# Patient Record
Sex: FEMALE | Race: WHITE | NOT HISPANIC OR LATINO | Employment: STUDENT | URBAN - METROPOLITAN AREA
[De-identification: names, ages, dates, MRNs, and addresses within clinical notes are randomized per-mention and may not be internally consistent; named-entity substitution may affect disease eponyms.]

---

## 2023-11-28 ENCOUNTER — OFFICE VISIT (OUTPATIENT)
Dept: URGENT CARE | Facility: CLINIC | Age: 33
End: 2023-11-28
Payer: COMMERCIAL

## 2023-11-28 VITALS
BODY MASS INDEX: 33.59 KG/M2 | DIASTOLIC BLOOD PRESSURE: 83 MMHG | RESPIRATION RATE: 16 BRPM | HEART RATE: 84 BPM | HEIGHT: 67 IN | WEIGHT: 214 LBS | SYSTOLIC BLOOD PRESSURE: 123 MMHG | OXYGEN SATURATION: 98 % | TEMPERATURE: 98 F

## 2023-11-28 DIAGNOSIS — J02.9 SORE THROAT: ICD-10-CM

## 2023-11-28 DIAGNOSIS — R09.81 SINUS CONGESTION: ICD-10-CM

## 2023-11-28 DIAGNOSIS — J06.9 VIRAL URI: Primary | ICD-10-CM

## 2023-11-28 LAB
CTP QC/QA: YES
CTP QC/QA: YES
POC MOLECULAR INFLUENZA A AGN: NEGATIVE
POC MOLECULAR INFLUENZA B AGN: NEGATIVE
SARS-COV-2 RDRP RESP QL NAA+PROBE: NEGATIVE

## 2023-11-28 PROCEDURE — 87070 CULTURE OTHR SPECIMN AEROBIC: CPT | Performed by: NURSE PRACTITIONER

## 2023-11-28 PROCEDURE — 99203 PR OFFICE/OUTPT VISIT, NEW, LEVL III, 30-44 MIN: ICD-10-PCS | Mod: S$GLB,,, | Performed by: NURSE PRACTITIONER

## 2023-11-28 PROCEDURE — 87502 INFLUENZA DNA AMP PROBE: CPT | Mod: QW,S$GLB,, | Performed by: NURSE PRACTITIONER

## 2023-11-28 PROCEDURE — 87635: ICD-10-PCS | Mod: QW,S$GLB,, | Performed by: NURSE PRACTITIONER

## 2023-11-28 PROCEDURE — 99203 OFFICE O/P NEW LOW 30 MIN: CPT | Mod: S$GLB,,, | Performed by: NURSE PRACTITIONER

## 2023-11-28 PROCEDURE — 87635 SARS-COV-2 COVID-19 AMP PRB: CPT | Mod: QW,S$GLB,, | Performed by: NURSE PRACTITIONER

## 2023-11-28 PROCEDURE — 87502 POCT INFLUENZA A/B MOLECULAR: ICD-10-PCS | Mod: QW,S$GLB,, | Performed by: NURSE PRACTITIONER

## 2023-11-28 RX ORDER — CETIRIZINE HYDROCHLORIDE 10 MG/1
TABLET ORAL
COMMUNITY
Start: 2018-05-01

## 2023-11-28 RX ORDER — RIZATRIPTAN BENZOATE 10 MG/1
TABLET ORAL
COMMUNITY

## 2023-11-28 RX ORDER — CHOLECALCIFEROL (VITAMIN D3) 125 MCG
CAPSULE ORAL
COMMUNITY
Start: 2023-10-23

## 2023-11-28 RX ORDER — ERENUMAB-AOOE 140 MG/ML
INJECTION, SOLUTION SUBCUTANEOUS
COMMUNITY
Start: 2023-07-16

## 2023-11-28 RX ORDER — MUPIROCIN 20 MG/G
OINTMENT TOPICAL 3 TIMES DAILY
COMMUNITY
Start: 2023-07-05

## 2023-11-28 RX ORDER — MAGNESIUM 200 MG
TABLET ORAL
COMMUNITY
Start: 2023-11-01

## 2023-11-28 RX ORDER — FREMANEZUMAB-VFRM 225 MG/1.5ML
INJECTION SUBCUTANEOUS
COMMUNITY

## 2023-11-28 RX ORDER — TRIAMCINOLONE ACETONIDE 1 MG/G
PASTE DENTAL
COMMUNITY
Start: 2023-11-20

## 2023-11-28 RX ORDER — BACLOFEN 5 MG/1
5 TABLET ORAL
COMMUNITY

## 2023-11-28 RX ORDER — CARBAMAZEPINE 100 MG/5ML
SUSPENSION ORAL
COMMUNITY
Start: 2021-05-28

## 2023-11-28 RX ORDER — CEPHALEXIN 500 MG/1
500 CAPSULE ORAL 3 TIMES DAILY
COMMUNITY
Start: 2023-07-05

## 2023-11-28 RX ORDER — MONTELUKAST SODIUM 10 MG/1
TABLET ORAL
COMMUNITY
Start: 2021-05-01

## 2023-11-28 RX ORDER — PERPHENAZINE 8 MG
TABLET ORAL
COMMUNITY
Start: 2023-10-02

## 2023-11-28 RX ORDER — PREDNISONE 20 MG/1
20 TABLET ORAL DAILY
Qty: 3 TABLET | Refills: 0 | Status: SHIPPED | OUTPATIENT
Start: 2023-11-28 | End: 2023-12-01

## 2023-11-28 RX ORDER — LAMOTRIGINE 100 MG/1
TABLET, ORALLY DISINTEGRATING ORAL
COMMUNITY
Start: 2021-05-28

## 2023-11-28 NOTE — PROGRESS NOTES
"Subjective:      Patient ID: Maricel Jha is a 33 y.o. female.    Vitals:  height is 5' 7" (1.702 m) and weight is 97.1 kg (214 lb). Her temperature is 98.4 °F (36.9 °C). Her blood pressure is 123/83 and her pulse is 84. Her respiration is 16 and oxygen saturation is 98%.     Chief Complaint: Sinus Problem    Pt presents complaints of sinus pressure, ear pain and a sore throat. Symptoms started yesterday. Sinus pressure is in located in her face and above her eyes. Right ear. Pain level 2. Pt does suffer with cluster headaches. Right side of throat lymph nodes feel swollen.   Provider note begins below    Patient reports a history of headaches that is similar to a trigeminal neuralgia type symptoms.  She states this is different from those headaches.  She is visiting now from [place].  Denies any ill contacts.  No cough or fevers.  Patient states she gets strep a lot in the office test usually are negative.  She reports recent issue with gum swelling she has some triamcinolone paste in his helped.  She reports nasal irritation with some blood in her discharge.  Denies any discharge from nose or cough.    Sinus Problem  This is a new problem. The current episode started yesterday. The problem has been gradually worsening since onset. There has been no fever. Her pain is at a severity of 2/10. The pain is mild. Associated symptoms include congestion, headaches, sinus pressure, a sore throat and swollen glands. Pertinent negatives include no chills, coughing, diaphoresis, ear pain, hoarse voice, neck pain, shortness of breath or sneezing. Past treatments include nothing.       Constitution: Negative for chills and sweating.   HENT:  Positive for congestion, sinus pressure and sore throat. Negative for ear pain.    Neck: Negative for neck pain.   Respiratory:  Negative for cough and shortness of breath.    Allergic/Immunologic: Negative for sneezing.   Neurological:  Positive for headaches.      Objective: "     Physical Exam   Constitutional: She is oriented to person, place, and time.   HENT:   Head: Normocephalic and atraumatic.   Ears:   Right Ear: Tympanic membrane, external ear and ear canal normal. impacted cerumen  Left Ear: Tympanic membrane, external ear and ear canal normal. impacted cerumen  Nose: No rhinorrhea or congestion.   Mouth/Throat: Posterior oropharyngeal erythema present. No oropharyngeal exudate.   Cardiovascular: Normal rate and regular rhythm.   Pulmonary/Chest: Effort normal. No stridor. No respiratory distress. She has no wheezes. She has no rhonchi. She has no rales. She exhibits no tenderness.   Abdominal: Normal appearance.   Lymphadenopathy:     She has no cervical adenopathy.   Neurological: She is alert and oriented to person, place, and time.   Skin: Skin is warm and dry.   Psychiatric: Her behavior is normal. Mood normal.     Results for orders placed or performed in visit on 11/28/23   POCT Influenza A/B MOLECULAR   Result Value Ref Range    POC Molecular Influenza A Ag Negative Negative, Not Reported    POC Molecular Influenza B Ag Negative Negative, Not Reported     Acceptable Yes    POCT COVID-19 Rapid Screening   Result Value Ref Range    POC Rapid COVID Negative Negative     Acceptable Yes       Assessment:     1. Viral URI    2. Sinus congestion    3. Sore throat        Plan:   Flu and COVID test negative   Throat culture due to patient's history of strep.  Declines strep test in clinic with like a culture.  Short course of steroids to decrease inflammation   Follow-up with PCP  Follow up if not improving or worsening symptoms.      Viral URI    Sinus congestion  -     Cancel: SARS Coronavirus 2 Antigen, POCT Manual Read  -     POCT Influenza A/B MOLECULAR  -     POCT COVID-19 Rapid Screening  -     predniSONE (DELTASONE) 20 MG tablet; Take 1 tablet (20 mg total) by mouth once daily. for 3 days  Dispense: 3 tablet; Refill: 0    Sore throat  -      CULTURE, RESPIRATORY  - THROAT  -     predniSONE (DELTASONE) 20 MG tablet; Take 1 tablet (20 mg total) by mouth once daily. for 3 days  Dispense: 3 tablet; Refill: 0

## 2023-11-29 ENCOUNTER — TELEPHONE (OUTPATIENT)
Dept: URGENT CARE | Facility: CLINIC | Age: 33
End: 2023-11-29
Payer: COMMERCIAL

## 2023-11-29 ENCOUNTER — PATIENT MESSAGE (OUTPATIENT)
Dept: URGENT CARE | Facility: CLINIC | Age: 33
End: 2023-11-29
Payer: COMMERCIAL

## 2023-11-29 NOTE — TELEPHONE ENCOUNTER
Called For follow-up.  Patient reports some diarrhea and nausea which is gradually resolving.  Fluid and dietary advice given Advised to follow-up with PCP. Answered all questions.

## 2023-11-29 NOTE — TELEPHONE ENCOUNTER
I reached pt on her cell number and she mentions that Dr Gregg had already spoken to her and feels her symptoms are consisitent with a viral syndrome and I agree. Recommend rest, fluids, tylenol etc. Return if symptoms worsen.

## 2023-11-30 ENCOUNTER — TELEPHONE (OUTPATIENT)
Dept: URGENT CARE | Facility: CLINIC | Age: 33
End: 2023-11-30
Payer: COMMERCIAL

## 2023-11-30 NOTE — TELEPHONE ENCOUNTER
I spoke w/ the patient and reviewed preliminary result.  All questions answered.  Supportive therapy discussed.

## 2023-12-01 ENCOUNTER — TELEPHONE (OUTPATIENT)
Dept: URGENT CARE | Facility: CLINIC | Age: 33
End: 2023-12-01
Payer: COMMERCIAL

## 2023-12-01 LAB — BACTERIA THROAT CULT: NORMAL

## 2023-12-01 NOTE — TELEPHONE ENCOUNTER
Second attempt to contact patient regarding throat culture from previous visit 11/28/2023 with nurse practitioner Mina.  Patient did not answer the phone.  Left voicemail for patient to contact clinic if patient has any questions or concerns.  She is not reviewed her results via [employer].    Results for orders placed or performed in visit on 11/28/23   CULTURE, RESPIRATORY  - THROAT    Specimen: Throat   Result Value Ref Range    RESPIRATORY CULTURE - THROAT Normal respiratory belem    POCT Influenza A/B MOLECULAR   Result Value Ref Range    POC Molecular Influenza A Ag Negative Negative, Not Reported    POC Molecular Influenza B Ag Negative Negative, Not Reported     Acceptable Yes    POCT COVID-19 Rapid Screening   Result Value Ref Range    POC Rapid COVID Negative Negative     Acceptable Yes

## 2023-12-02 ENCOUNTER — TELEPHONE (OUTPATIENT)
Dept: URGENT CARE | Facility: CLINIC | Age: 33
End: 2023-12-02
Payer: COMMERCIAL

## 2023-12-02 NOTE — TELEPHONE ENCOUNTER
pt called back returning a providers call. informed pt of her negitive throat culture. pt states she is feeling better.

## 2024-04-17 ENCOUNTER — TELEPHONE (OUTPATIENT)
Dept: SPORTS MEDICINE | Facility: CLINIC | Age: 34
End: 2024-04-17
Payer: COMMERCIAL

## 2024-04-17 ENCOUNTER — PATIENT MESSAGE (OUTPATIENT)
Dept: SPORTS MEDICINE | Facility: CLINIC | Age: 34
End: 2024-04-17
Payer: COMMERCIAL

## 2024-04-17 NOTE — TELEPHONE ENCOUNTER
----- Message from Mounika Brandt sent at 4/17/2024  2:12 PM CDT -----  Regarding: pt advice  Contact: pt [phone]  Pt returning call from missed call, pls call

## 2024-04-22 ENCOUNTER — OFFICE VISIT (OUTPATIENT)
Dept: SPORTS MEDICINE | Facility: CLINIC | Age: 34
End: 2024-04-22
Payer: COMMERCIAL

## 2024-04-22 VITALS — SYSTOLIC BLOOD PRESSURE: 134 MMHG | DIASTOLIC BLOOD PRESSURE: 77 MMHG

## 2024-04-22 DIAGNOSIS — M99.00 SOMATIC DYSFUNCTION OF HEAD REGION: ICD-10-CM

## 2024-04-22 DIAGNOSIS — M99.07 UPPER EXTREMITY SOMATIC DYSFUNCTION: ICD-10-CM

## 2024-04-22 DIAGNOSIS — M79.10 MYALGIA: ICD-10-CM

## 2024-04-22 DIAGNOSIS — M99.01 CERVICAL (NECK) REGION SOMATIC DYSFUNCTION: ICD-10-CM

## 2024-04-22 DIAGNOSIS — G44.059 SHORT LASTING UNILATERAL NEURALGIFORM HEADACHE WITH CONJUNCTIVAL INJECTION AND TEARING (SUNCT), NOT INTRACTABLE: Primary | ICD-10-CM

## 2024-04-22 DIAGNOSIS — M54.2 CERVICALGIA: ICD-10-CM

## 2024-04-22 DIAGNOSIS — M99.02 SOMATIC DYSFUNCTION OF THORACIC REGION: ICD-10-CM

## 2024-04-22 DIAGNOSIS — M99.08 SOMATIC DYSFUNCTION OF RIB CAGE REGION: ICD-10-CM

## 2024-04-22 PROCEDURE — 99999 PR PBB SHADOW E&M-EST. PATIENT-LVL III: CPT | Mod: PBBFAC,,, | Performed by: NEUROMUSCULOSKELETAL MEDICINE & OMM

## 2024-04-22 PROCEDURE — 3078F DIAST BP <80 MM HG: CPT | Mod: CPTII,S$GLB,, | Performed by: NEUROMUSCULOSKELETAL MEDICINE & OMM

## 2024-04-22 PROCEDURE — 3075F SYST BP GE 130 - 139MM HG: CPT | Mod: CPTII,S$GLB,, | Performed by: NEUROMUSCULOSKELETAL MEDICINE & OMM

## 2024-04-22 PROCEDURE — 99204 OFFICE O/P NEW MOD 45 MIN: CPT | Mod: 25,S$GLB,, | Performed by: NEUROMUSCULOSKELETAL MEDICINE & OMM

## 2024-04-22 PROCEDURE — 1160F RVW MEDS BY RX/DR IN RCRD: CPT | Mod: CPTII,S$GLB,, | Performed by: NEUROMUSCULOSKELETAL MEDICINE & OMM

## 2024-04-22 PROCEDURE — 97110 THERAPEUTIC EXERCISES: CPT | Mod: GP,S$GLB,, | Performed by: NEUROMUSCULOSKELETAL MEDICINE & OMM

## 2024-04-22 PROCEDURE — 98927 OSTEOPATH MANJ 5-6 REGIONS: CPT | Mod: S$GLB,,, | Performed by: NEUROMUSCULOSKELETAL MEDICINE & OMM

## 2024-04-22 PROCEDURE — 1159F MED LIST DOCD IN RCRD: CPT | Mod: CPTII,S$GLB,, | Performed by: NEUROMUSCULOSKELETAL MEDICINE & OMM

## 2024-04-22 RX ORDER — PROGESTERONE 100 MG/1
100 CAPSULE ORAL DAILY
COMMUNITY

## 2024-04-22 NOTE — PROGRESS NOTES
Subjective:     Maricel Jha    Chief Complaint   Patient presents with    Headache     HPI    Maircel is coming in today for headache/neck pain that began about 3 years ago. Pt. describes the pain as a 4/10 achy pain that does radiate. At worst, gets to 7-8/10. Pain began on the right side of her head, face with redness of her head. 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 Symptoms for this manifest similar RIGHT eye twitching and right sided upper back, neck and shoulder pain. Denies radiation of pain down the arm and into the hand, fingers and denies sensation changes and neuropathic pain. There was not a fall/injury/ or trauma associated with the onset of symptoms. The pain is better with stretching, prior OMT type treatments and worse with lack of sleep, stress. Pt. Denies any other musculoskeletal complaints at this time.     PAST MEDICAL HISTORY: No past medical history on file.  PAST SURGICAL HISTORY: No past surgical history on file.  FAMILY HISTORY: No family history on file.  SOCIAL HISTORY:   Social History     Socioeconomic History    Marital status:    Tobacco Use    Smoking status: Never    Smokeless tobacco: Never     Social Determinants of Health     Financial Resource Strain: Low Risk  (4/16/2024)    Received from [facility]    Overall Financial Resource Strain (CARDIA)     Difficulty of Paying Living Expenses: Not hard at all   Food Insecurity: No Food Insecurity (4/16/2024)    Received from [facility]    Hunger Vital Sign     Worried About Running Out of Food in the Last Year: Never true     Ran Out of Food in the Last Year: Never true   Transportation Needs: No Transportation Needs (4/16/2024)    Received from [facility]    PRAPARE - Transportation     Lack of Transportation (Medical): No     Lack of Transportation (Non-Medical): No   Physical Activity: Insufficiently Active (4/16/2024)     Received from [facility]    Exercise Vital Sign     Days of Exercise per Week: 2 days     Minutes of Exercise per Session: 60 min   Stress: No Stress Concern Present (2024)    Received from [facility]    [employer] [place] [employer] - Occupational Stress Questionnaire     Feeling of Stress : Only a little   Social Connections: Moderately Integrated (2024)    Received from [facility]    Social Connection and Isolation Panel [NHANES]     Frequency of Communication with Friends and Family: More than three times a week     Frequency of Social Gatherings with Friends and Family: More than three times a week     Attends Nondenominational Services: Never     Active Member of Clubs or Organizations: Yes     Attends Club or Organization Meetings: More than 4 times per year     Marital Status:      MEDICATIONS:   Current Outpatient Medications:     AJOVY AUTOINJECTOR 225 mg/1.5 mL autoinjector, Inject into the skin., Disp: , Rfl:     carBAMazepine 100 mg/5 mL (5 mL) Susp, , Disp: , Rfl:     cetirizine (ZYRTEC) 10 MG tablet, , Disp: , Rfl:     cholecalciferol, vitamin D3, 125 mcg (5,000 unit) capsule, , Disp: , Rfl:     lamoTRIgine 100 mg TbDL, , Disp: , Rfl:     magnesium 200 mg Tab, , Disp: , Rfl:     montelukast (SINGULAIR) 10 mg tablet, , Disp: , Rfl:     orphenadrine/aspirin/caffeine (NORGESIC FORTE ORAL), , Disp: , Rfl:     progesterone (PROMETRIUM) 100 MG capsule, Take 100 mg by mouth once daily., Disp: , Rfl:     rizatriptan (MAXALT) 10 MG tablet, Take by mouth., Disp: , Rfl:     ubrogepant (UBRELVY ORAL), Take by mouth., Disp: , Rfl:     vitamin B comp and C no.3 (B COMPLEX PLUS VITAMIN C) 15-10-50-5-300 mg Cap, , Disp: , Rfl:     acetylcysteine 500 mg Cap, , Disp: , Rfl:     AIMOVIG AUTOINJECTOR 140 mg/mL autoinjector, SMARTSI Milligram(s) SUB-Q Once a Month (Patient not taking: Reported on 2024), Disp: , Rfl:     baclofen (LIORESAL) 5 mg Tab tablet, Take 5 mg by mouth. (Patient not  taking: Reported on 4/22/2024), Disp: , Rfl:     cephALEXin (KEFLEX) 500 MG capsule, Take 500 mg by mouth 3 (three) times daily. (Patient not taking: Reported on 4/22/2024), Disp: , Rfl:     mupirocin (BACTROBAN) 2 % ointment, Apply topically 3 (three) times daily. (Patient not taking: Reported on 4/22/2024), Disp: , Rfl:     triamcinolone acetonide 0.1% (KENALOG) 0.1 % paste, SMARTSIG:By Mouth 4-6 Times Daily PRN (Patient not taking: Reported on 4/22/2024), Disp: , Rfl:     ALLERGIES:   Review of patient's allergies indicates:   Allergen Reactions    Nsaids (non-steroidal anti-inflammatory drug) Rash and Shortness Of Breath    Sumatriptan Other (See Comments)     Severe neck and back pain    Ibuprofen Other (See Comments)     Objective:     VITAL SIGNS: /77    General    Vitals reviewed.  Constitutional: She is oriented to person, place, and time. She appears well-developed and well-nourished.   Neurological: She is alert and oriented to person, place, and time.   Psychiatric: She has a normal mood and affect. Her behavior is normal.           MUSCULOSKELETAL EXAM:  Cervical Spine: right lcervical region    Observation:    Posture:  Upright  No obvious shoulder un-leveling while standing.    No edema, erythema, or ecchymosis noted in cervical and thoraic spine regions.    No midline skin abnormalities.    No atrophy of upper limb musculature.  Gait: Non-antalgic with Neutral ankle mechanics and Pes planus . Gait without trendelenberg, heel walking, toe walking, and tandem walking.    Tenderness:  No tenderness throughout the cervical spine upon spinous process palpation  + tenderness over the base of the occiput on the right  No bony deformities or step-offs palpated.   +tenderness over the posterior paraspinals in cervical spine on right, over the right uppe trapezius with HTP palpated.    Range of Motion (* = with pain):  CERVICAL SPINE  Full AROM in flexion*, extension, sidebending*, and  rotation*.    SHOULDER  Active flexion to 180° on left and 180° on right.  Active abduction to 180° on left and 180° on right.  Active internal rotation to T7 on left and T7 on right.    Active external rotation to T4 on left and T7 on right.    No scapular dyskinesia or winging.    Strength Testing (* = with pain):  Sternocleidomastoid - 5/5 on left and 5/5 on right  Upper trapezius-  5/5 on left and 5/5 on right  Deltoid - 5/5 on left and 5/5 on right  Biceps - 5/5 on left and 5/5 on right  Triceps - 5/5 on left and 5/5 on right  Wrist extension - 5/5 on left and 5/5 on right  Wrist flexion - 5/5 on left and 5/5 on right   - 5/5 on left and 5/5 on right  Finger extension - 5/5 on left and 5/5 on right  Finger abduction - 5/5 on left and 5/5 on right    Structural Exam:  TART (Tissue texture abnormality, Asymmetry,  Restriction of motion and/or Tenderness) changes:    Head:   Cranial Rhythmic Impulse (CRI): restricted with Decreased amplitude    Strain Patterns: absent  Occipitoatlantal (OA) Joint: ES-right, R-left  Suture/Bone Restriction Side   Occipitomastoid (OM)  Present L   Parietal mastoid (PM) Absent    Petrojugular (PJ) Absent    Sphenosquamous pivot (SSP) Absent    Zygomaticotemporal (ZT) Absent    Zygomaticofrontal (ZF) Absent    Frontonasal Absent    Pomerene bone Absent    Parietal bone Absent    Frontal bone Absent         Cervical Spine   C1 TTA LEFT   C2 FRS LEFT   C3 Neutral   C4 Neutral   C5 Neutral   C6 Neutral   C7 FRS LEFT      Thoracic Spine   T1 ERS LEFT   T2 Neutral   T3 Neutral   T4 Neutral   T5 FRS LEFT   T6 NS-left,R-right   T7 NS-left,R-right   T8 NS-left,R-right   T9 NS-left,R-right   T10 Neutral   T11 Neutral   T12 Neutral     Rib cage: R6-9 TTA on right, R1 inhaled on right    Upper extremity: Right thoracic outlet TTA, right upper trapezius TTA    Key   F= Flexed   E = Extended   R = Rotated   S = Sidebent   TTA = tissue texture abnormality     Neurovascular Exam:  Spurling's -  negative on left and negative on right  Lhermitte's Sign - absent  Seated straight leg raise - negative on left and negative on right  Reflexes +2/4 and symmetric at the biceps, brachioradialis, and triceps bilaterally   Sensation intact to light touch in the C5-T1 dermatomes bilaterally.   Emiliana's sign- absent  Intact and symmetric radial pulses bilaterally.    Assessment:      Encounter Diagnoses   Name Primary?    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 Neck, right sided upper back myofascial pain, myalgia with underlying AIDA headaches as well as biomechanical restrictions of the upper kinetic chain and poor posture  - OMT performed today to help with symptom relief. HEP initiated to help increase cervicothoracic spine and shoulder ROM.  - Referral placed to pain management for evaluation for greater occipital nerve blocks with Dr. Yeh, as pt. Has noted benefit in the past with these  - Discussed proper posture and work desk and desk accommodations to facilitate good posture.    2. OMT 5-6 regions. Oral consent obtained.  Reviewed benefits and potential side effects.   - OMT indicated today due to signs and symptoms as well as local and remote somatic dysfunction findings and their related neurokinetic, lymphatic, fascial and/or arteriovenous body connections.   - OMT techniques used: Myofascial Release, Muscle Energy, High Velocity Low Amplitude, Still's Technique, and Cranial    - Treatment was tolerated well. Improvement noted in segmental mobility post-treatment in dysfunctional regions. There were no adverse events and no complications immediately following treatment.      3. Pt. Given the following HEP:    A) Cervicothoracic junction mobilization exercise: 10-15 reps, twice daily. Handout also given.    B) Sidelying reach and roll stretch: Laying on your side with arms extend out, pull back top arm and flex at the elbow, then rotate thoracic spine and open up arm and chest. Repeat for a total of 10 reps on each side, twice daily. Hand-out also given.   C) Scalene, upper trapezius, and levator scapulae stretches : hold neck sidebending stretch for 30 seconds in each plane, bilaterally, twice daily. Hand out given.    D) Seated anterior pelvic tilt exercise: rotate pelvis forward to sit on ischial tuberosities while maintaining neutral shoulder positioning. Repeat frequently throughout the day.     [address] HOME EXERCISE PROGRAM (HEP):  The patient was taught a homegoing physical therapy regimen as described above. The patient demonstrated understanding of the exercises and proper technique of their execution. This interaction took 15 minutes.     4. Follow-up in 3-4 weeks for reevaluation    5. Patient agreeable to today's plan and all questions were answered    This note is dictated using the M*Modal Fluency Direct word recognition program. There are word recognition mistakes that are occasionally missed on review.

## 2024-04-25 ENCOUNTER — TELEPHONE (OUTPATIENT)
Dept: PAIN MEDICINE | Facility: CLINIC | Age: 34
End: 2024-04-25
Payer: COMMERCIAL

## 2024-04-25 NOTE — TELEPHONE ENCOUNTER
----- Message from Chet Brandt sent at 4/24/2024  3:38 PM CDT -----  Type:  Patient Returning Call    Who Called: pt  Who Left Message for Patient: unknown  Does the patient know what this is regarding?: yes  Would the patient rather a call back or a response via [facility]? call  Best Call Back Number: [phone]  Additional Information:

## 2024-04-25 NOTE — TELEPHONE ENCOUNTER
Staff called pt to assist her with any questions or concerns she may have. Pt did not answer and the phone just continue to ring.

## 2024-05-01 ENCOUNTER — TELEPHONE (OUTPATIENT)
Dept: SPINE | Facility: CLINIC | Age: 34
End: 2024-05-01
Payer: COMMERCIAL

## 2024-05-01 NOTE — TELEPHONE ENCOUNTER
Staff contacted patient to confirm appointment on 05/02/24. There was no answer, left voicemail.

## 2024-05-02 ENCOUNTER — HOSPITAL ENCOUNTER (OUTPATIENT)
Dept: RADIOLOGY | Facility: OTHER | Age: 34
Discharge: HOME | End: 2024-05-02
Attending: STUDENT IN AN ORGANIZED HEALTH CARE EDUCATION/TRAINING PROGRAM
Payer: COMMERCIAL

## 2024-05-02 ENCOUNTER — OFFICE VISIT (OUTPATIENT)
Dept: SPINE | Facility: CLINIC | Age: 34
End: 2024-05-02
Payer: COMMERCIAL

## 2024-05-02 ENCOUNTER — TELEPHONE (OUTPATIENT)
Dept: PAIN MEDICINE | Facility: CLINIC | Age: 34
End: 2024-05-02
Payer: COMMERCIAL

## 2024-05-02 VITALS
DIASTOLIC BLOOD PRESSURE: 56 MMHG | BODY MASS INDEX: 33.21 KG/M2 | HEART RATE: 69 BPM | WEIGHT: 211.63 LBS | OXYGEN SATURATION: 100 % | RESPIRATION RATE: 12 BRPM | SYSTOLIC BLOOD PRESSURE: 113 MMHG | HEIGHT: 67 IN

## 2024-05-02 DIAGNOSIS — M54.81 OCCIPITAL NEURALGIA, UNSPECIFIED LATERALITY: Primary | ICD-10-CM

## 2024-05-02 DIAGNOSIS — M54.2 CERVICALGIA: ICD-10-CM

## 2024-05-02 DIAGNOSIS — M79.10 MYALGIA: ICD-10-CM

## 2024-05-02 DIAGNOSIS — M54.81 OCCIPITAL NEURALGIA OF RIGHT SIDE: Primary | ICD-10-CM

## 2024-05-02 DIAGNOSIS — G44.059 SHORT LASTING UNILATERAL NEURALGIFORM HEADACHE WITH CONJUNCTIVAL INJECTION AND TEARING (SUNCT), NOT INTRACTABLE: ICD-10-CM

## 2024-05-02 PROCEDURE — 1160F RVW MEDS BY RX/DR IN RCRD: CPT | Mod: CPTII,S$GLB,, | Performed by: ANESTHESIOLOGY

## 2024-05-02 PROCEDURE — 72052 X-RAY EXAM NECK SPINE 6/>VWS: CPT | Mod: TC,FY

## 2024-05-02 PROCEDURE — 99999 PR PBB SHADOW E&M-EST. PATIENT-LVL V: CPT | Mod: PBBFAC,,, | Performed by: ANESTHESIOLOGY

## 2024-05-02 PROCEDURE — 99204 OFFICE O/P NEW MOD 45 MIN: CPT | Mod: S$GLB,,, | Performed by: ANESTHESIOLOGY

## 2024-05-02 PROCEDURE — 3074F SYST BP LT 130 MM HG: CPT | Mod: CPTII,S$GLB,, | Performed by: ANESTHESIOLOGY

## 2024-05-02 PROCEDURE — 3078F DIAST BP <80 MM HG: CPT | Mod: CPTII,S$GLB,, | Performed by: ANESTHESIOLOGY

## 2024-05-02 PROCEDURE — 1159F MED LIST DOCD IN RCRD: CPT | Mod: CPTII,S$GLB,, | Performed by: ANESTHESIOLOGY

## 2024-05-02 PROCEDURE — 72052 X-RAY EXAM NECK SPINE 6/>VWS: CPT | Mod: 26,,, | Performed by: RADIOLOGY

## 2024-05-02 PROCEDURE — 3008F BODY MASS INDEX DOCD: CPT | Mod: CPTII,S$GLB,, | Performed by: ANESTHESIOLOGY

## 2024-05-02 NOTE — PROGRESS NOTES
New Patient Consult    PCP: Jackie Valenzuela MD    REFERRING PHYSICIAN: Divina Patel DO    CHIEF COMPLAINT: Headaches    Original HISTORY OF PRESENT ILLNESS: Maricel Jha presents to the clinic for the evaluation of the above pain. The pain started 3 years ago.     Original Pain Description:  The patient is a 34 year old female with pmh of AIDA who presents for evaluation and treatment of headaches. The headaches started 3 years ago with no inciting factor. The pain is right sided and is located in the upper neck/base of the skull and spread towards the right side of the face. Pain in the back is described as a soreness, pain in the front is more of a stabbing and searing pain.  Often she will get episodes of tearing and facial flushing. Exacerbating factors: Stress, wind, brushing teeth, brushing hair. Mitigating factors: medication. Symptoms interfere with work and quality of life. The patient feels like symptoms have been unchanged. No photophobia, nausea, vomiting, auras.     She recently moved from [place] to [place]. She see's a neurologist in [place] who researches AIDA and is happy with her care there. Her neurologist over there manages her medications. She gets cervical trigger point injections and greater occipital nerve blocks about every 6-8 weeks. These give her about 75% relief for 4 weeks. Steroids are added about every other injection. She has had 6 injections so far, last one was earlier this week. Overall, she is happy with her treatment and wants to continue as is for now.         Original PAIN SCORES:  Best: Pain is 2  Worst: Pain is 9  Current: Pain is 4        5/2/2024    10:04 AM   Last 3 PDI Scores   Pain Disability Index (PDI) 24       INTERVAL HISTORY: (Newest visit at the bottom)   Interval History (Date):       6 weeks of Conservative therapy:  PT: n/a (Must include dates)  HEP: n/a      Treatments / Medications:  (Ice/Heat/NSAIDS/APAP/etc):  Carbamazepine  Ajovy  Maxalt  Ubrelvy  Lamotrigine      Interventional Pain Procedures: (Previous injections)  Occipital nerve blocks and cervical trigger point injections    No past medical history on file.  No past surgical history on file.  Social History     Socioeconomic History    Marital status:    Tobacco Use    Smoking status: Never    Smokeless tobacco: Never     Social Determinants of Health     Financial Resource Strain: Low Risk  (4/16/2024)    Received from [facility]    Overall Financial Resource Strain (CARDIA)     Difficulty of Paying Living Expenses: Not hard at all   Food Insecurity: No Food Insecurity (4/16/2024)    Received from [facility]    Hunger Vital Sign     Worried About Running Out of Food in the Last Year: Never true     Ran Out of Food in the Last Year: Never true   Transportation Needs: No Transportation Needs (4/16/2024)    Received from [facility]    PRAPARE - Transportation     Lack of Transportation (Medical): No     Lack of Transportation (Non-Medical): No   Physical Activity: Insufficiently Active (4/16/2024)    Received from [facility]    Exercise Vital Sign     Days of Exercise per Week: 2 days     Minutes of Exercise per Session: 60 min   Stress: No Stress Concern Present (4/16/2024)    Received from [facility]    [employer] - Occupational Stress Questionnaire     Feeling of Stress : Only a little     No family history on file.    Review of patient's allergies indicates:   Allergen Reactions    Nsaids (non-steroidal anti-inflammatory drug) Rash and Shortness Of Breath    Sumatriptan Other (See Comments)     Severe neck and back pain    Ibuprofen Other (See Comments)       Current Outpatient Medications   Medication Sig Dispense Refill    AJOVY AUTOINJECTOR 225 mg/1.5 mL autoinjector Inject into the skin.      carBAMazepine 100 mg/5 mL (5 mL) Susp       cetirizine (ZYRTEC) 10 MG tablet        "cholecalciferol, vitamin D3, 125 mcg (5,000 unit) capsule       lamoTRIgine 100 mg TbDL       magnesium 200 mg Tab       montelukast (SINGULAIR) 10 mg tablet       orphenadrine/aspirin/caffeine (NORGESIC FORTE ORAL)       progesterone (PROMETRIUM) 100 MG capsule Take 100 mg by mouth once daily.      rizatriptan (MAXALT) 10 MG tablet Take by mouth.      ubrogepant (UBRELVY ORAL) Take by mouth.      vitamin B comp and C no.3 (B COMPLEX PLUS VITAMIN C) 15-10-50-5-300 mg Cap        No current facility-administered medications for this visit.       ROS:  GENERAL: No fever. No chills. No fatigue.   HEENT: + tearing  CV: Denies chest pain.   PULM: Denies of shortness of breath.  GI: Denies constipation. No diarrhea. No abdominal pain. Denies nausea. Denies vomiting. No blood in stool.  HEME: Denies bleeding problems.  : Denies urgency. No painful urination. No blood in urine.  MS: + neck pain  SKIN: Denies rash.   NEURO: + headaches, no weakness  PSYCH:  Denies difficulty sleeping. No anxiety. Denies depression. No suicidal thoughts.       VITALS:   Vitals:    05/02/24 1000   BP: (!) 113/56   Pulse: 69   Resp: 12   SpO2: 100%   Weight: 96 kg (211 lb 10.3 oz)   Height: 5' 7" (1.702 m)   PainSc:   4         PHYSICAL EXAM:   GENERAL: Well appearing, in no acute distress, alert and oriented x3.  PSYCH:  Mood and affect appropriate.  SKIN: Skin color, texture, turgor normal, no rashes or lesions.  HEENT:  Normocephalic, atraumatic. Cranial nerves grossly intact.  NECK: No pain to palpation over the cervical paraspinous muscles. No pain to palpation over facets. There is pain with lateral rotation bilaterally.   Full ROM with flexion, extension, lateral rotation. Lhermitte's and Spurling's are negative  PULM: No evidence of respiratory difficulty, symmetric chest rise.  GI:  Non-distended  EXTREMITIES: No deformities, edema, or skin discoloration.   NEURO: CN II-XII are intact. Sensation is equal and appropriate bilaterally. " Bilateral upper and lower extremity strength is normal and symmetric. Bilateral upper and lower extremity coordination and muscle stretch reflexes are physiologic and symmetric. Plantar response are downgoing. Negative Hoffmans.No clonus  GAIT: normal.      IMAGING:    EXAM: Brain w/wo contrast MRI     DATE: 5/29/2021 11:00     INDICATION:  - concern for brainstem MS/infalmmatory/demyelinating condition, patient with trigeminal pain/redness x4 weeks. please thin cuts brain stem.     COMPARISON: CTA 5/29/2021     TECHNIQUE: Multiplanar multisequence images of the brain were obtained before and after the intravenous administration of contrast material.     DISCUSSION:     No acute parenchymal abnormality.   No hydrocephalus, midline shift, mass effect, restricted diffusion or acute hemorrhage.   Pineal region, pituitary gland, cranio cervical junction, orbits and internal auditory canals are unremarkable.   Major intracranial flow-voids are well-maintained.   No mass or abnormal enhancement. The cisternal segments of the trigeminal nerves are unremarkable with no abnormal enhancement.   No osseous lesions.     IMPRESSION:     Unremarkable MRI of the brain with and without contrast.       ASSESSMENT: 34 y.o. year old female with pain, consistent with:    Encounter Diagnoses   Name Primary?    short-lasting unilateral neuralgiform headache attacks with cranial autonomic symptoms (AIDA), not intractable     Cervicalgia     Myalgia     Occipital neuralgia of right side Yes       DISCUSSION: Maricel Jha is a 34 year old who comes to us with AIDA and occipital neuralgia. She has received significant relief with her medications and cervical TPI and occipital nerve blocks and is happy with her treatment as is currently.       PLAN:  Schedule for right ultrasound guided occipital nerve block  Cervical XR with flex/ext to evaluate for neck pathology that could be contributing to headaches  Recommend that she continue to  follow up with her neurologist in [place]  Request records from neurologist in [place]  If symptoms worsen or current treatment stops working, could consider sphenopalatine block in future.       I would like to thank Divina Patel DO for the opportunity to assist in the care of this patient. We had a very nice visit and I look forward to continuing their care. Please let me know if I can be of further assistance.     Pramod Leal  05/02/2024  [employer] pain fellow     I spent a total of 30 minutes on the day of the visit.  This includes face to face time and non-face to face time preparing to see the patient by reviewing previous labs/imaging, obtaining and/or reviewing separately obtained history, documenting clinical information in the electronic or other health record, independently interpreting results and communicating results to the patient/family/caregiver.    Osman Yeh

## 2024-05-13 ENCOUNTER — OFFICE VISIT (OUTPATIENT)
Dept: SPORTS MEDICINE | Facility: CLINIC | Age: 34
End: 2024-05-13
Payer: COMMERCIAL

## 2024-05-13 VITALS — SYSTOLIC BLOOD PRESSURE: 116 MMHG | DIASTOLIC BLOOD PRESSURE: 71 MMHG

## 2024-05-13 DIAGNOSIS — M79.10 MYALGIA: ICD-10-CM

## 2024-05-13 DIAGNOSIS — G44.059 SHORT LASTING UNILATERAL NEURALGIFORM HEADACHE WITH CONJUNCTIVAL INJECTION AND TEARING (SUNCT), NOT INTRACTABLE: Primary | ICD-10-CM

## 2024-05-13 DIAGNOSIS — M79.18 MYOFASCIAL PAIN: ICD-10-CM

## 2024-05-13 DIAGNOSIS — M99.07 UPPER EXTREMITY SOMATIC DYSFUNCTION: ICD-10-CM

## 2024-05-13 DIAGNOSIS — M99.08 RIB CAGE REGION SOMATIC DYSFUNCTION: ICD-10-CM

## 2024-05-13 DIAGNOSIS — M99.02 THORACIC REGION SOMATIC DYSFUNCTION: ICD-10-CM

## 2024-05-13 DIAGNOSIS — M99.01 CERVICAL SOMATIC DYSFUNCTION: ICD-10-CM

## 2024-05-13 DIAGNOSIS — M54.2 CERVICALGIA: ICD-10-CM

## 2024-05-13 DIAGNOSIS — M99.00 CRANIAL SOMATIC DYSFUNCTION: ICD-10-CM

## 2024-05-13 PROCEDURE — 1159F MED LIST DOCD IN RCRD: CPT | Mod: CPTII,S$GLB,, | Performed by: NEUROMUSCULOSKELETAL MEDICINE & OMM

## 2024-05-13 PROCEDURE — 99213 OFFICE O/P EST LOW 20 MIN: CPT | Mod: 25,S$GLB,, | Performed by: NEUROMUSCULOSKELETAL MEDICINE & OMM

## 2024-05-13 PROCEDURE — 99999 PR PBB SHADOW E&M-EST. PATIENT-LVL III: CPT | Mod: PBBFAC,,, | Performed by: NEUROMUSCULOSKELETAL MEDICINE & OMM

## 2024-05-13 PROCEDURE — 3074F SYST BP LT 130 MM HG: CPT | Mod: CPTII,S$GLB,, | Performed by: NEUROMUSCULOSKELETAL MEDICINE & OMM

## 2024-05-13 PROCEDURE — 3078F DIAST BP <80 MM HG: CPT | Mod: CPTII,S$GLB,, | Performed by: NEUROMUSCULOSKELETAL MEDICINE & OMM

## 2024-05-13 PROCEDURE — 98927 OSTEOPATH MANJ 5-6 REGIONS: CPT | Mod: S$GLB,,, | Performed by: NEUROMUSCULOSKELETAL MEDICINE & OMM

## 2024-05-13 NOTE — PROGRESS NOTES
Subjective:     Maricel Jha    Chief Complaint   Patient presents with    Follow-up     HPI    Maricel is a 34 y.o. female with PMHx of AIDA coming in today for follow up for right-sided headache/neck pain. Since last visit the pain has Improved.  Pt is compliant with HEP. The pain is better with stretching, prior OMT type treatments and worse with lack of sleep, stress. Pt saw Dr. Yeh in pain management for occipital nerve blocks and cervical TPI and appreciated this consultation. Pt. describes the pain as a 1/10 currently states that it feels like soreness in the right occipital region with some radiation into the right. There has not been any new a fall/injury/ or traumas since last visit.  Pt. denies any new musculoskeletal complaints at this time. Denies dizziness/lightheadedness, weakness, changes in balance.     Office note from 4/22/24 reviewed    PAST MEDICAL HISTORY: History reviewed. No pertinent past medical history.  PAST SURGICAL HISTORY: History reviewed. No pertinent surgical history.  FAMILY HISTORY: No family history on file.    SOCIAL HISTORY:   Social History     Socioeconomic History    Marital status:    Tobacco Use    Smoking status: Never    Smokeless tobacco: Never     Social Determinants of Health     Financial Resource Strain: Low Risk  (4/16/2024)    Received from [facility]    Overall Financial Resource Strain (CARDIA)     Difficulty of Paying Living Expenses: Not hard at all   Food Insecurity: No Food Insecurity (4/16/2024)    Received from [facility]    Hunger Vital Sign     Worried About Running Out of Food in the Last Year: Never true     Ran Out of Food in the Last Year: Never true   Transportation Needs: No Transportation Needs (4/16/2024)    Received from [facility]    PRAPARE - Transportation     Lack of Transportation (Medical): No     Lack of Transportation (Non-Medical): No   Physical Activity: Insufficiently Active (4/16/2024)    Received from [facility]     Exercise Vital Sign     Days of Exercise per Week: 2 days     Minutes of Exercise per Session: 60 min   Stress: No Stress Concern Present (4/16/2024)    Received from [facility] - Occupational Stress Questionnaire     Feeling of Stress : Only a little     MEDICATIONS:   Current Outpatient Medications:     AJOVY AUTOINJECTOR 225 mg/1.5 mL autoinjector, Inject into the skin., Disp: , Rfl:     carBAMazepine 100 mg/5 mL (5 mL) Susp, , Disp: , Rfl:     cetirizine (ZYRTEC) 10 MG tablet, , Disp: , Rfl:     cholecalciferol, vitamin D3, 125 mcg (5,000 unit) capsule, , Disp: , Rfl:     lamoTRIgine 100 mg TbDL, , Disp: , Rfl:     magnesium 200 mg Tab, , Disp: , Rfl:     montelukast (SINGULAIR) 10 mg tablet, , Disp: , Rfl:     orphenadrine/aspirin/caffeine (NORGESIC FORTE ORAL), , Disp: , Rfl:     progesterone (PROMETRIUM) 100 MG capsule, Take 100 mg by mouth once daily., Disp: , Rfl:     rizatriptan (MAXALT) 10 MG tablet, Take by mouth., Disp: , Rfl:     ubrogepant (UBRELVY ORAL), Take by mouth., Disp: , Rfl:     vitamin B comp and C no.3 (B COMPLEX PLUS VITAMIN C) 15-10-50-5-300 mg Cap, , Disp: , Rfl:     ALLERGIES:   Review of patient's allergies indicates:   Allergen Reactions    Nsaids (non-steroidal anti-inflammatory drug) Rash and Shortness Of Breath    Sumatriptan Other (See Comments)     Severe neck and back pain    Ibuprofen Other (See Comments)     Reviewed office visit on 5/2/24 with Dr. Yeh:  Maricel Jha is a 34 year old who comes to us with AIDA and occipital neuralgia. She has received significant relief with her medications and cervical TPI and occipital nerve blocks and is happy with her treatment as is currently.      PLAN:  Schedule for right ultrasound guided occipital nerve block  Cervical XR with flex/ext to evaluate for neck pathology that could be contributing to headaches  Recommend that she continue to follow up with her neurologist in  [place]  Request records from neurologist in [place]  If symptoms worsen or current treatment stops working, could consider sphenopalatine block in future.     IMAGIN. X-ray ordered due to neck pain (AP, lateral, bilateral obliques, and odontoid  flexion and extension views) taken 24.   2. FINDINGS: Vertebral body heights are maintained. Disc spaces are maintained. Straightening of the normal cervical lordosis.  Otherwise, AP alignment is anatomic. No significant prevertebral soft tissue edema.  3. IMPRESSION: No acute osseous abnormality seen. Straightening of the normal cervical lordosis which may be positional or related to muscle spasm.     Objective:     VITAL SIGNS: /71    General    Vitals reviewed.  Constitutional: She is oriented to person, place, and time. She appears well-developed and well-nourished.   Neurological: She is alert and oriented to person, place, and time.   Psychiatric: She has a normal mood and affect. Her behavior is normal.           MUSCULOSKELETAL EXAM:  Cervical Spine: right cervical region    Observation:    Posture:  Upright  No obvious shoulder un-leveling while standing.    No edema, erythema, or ecchymosis noted in cervical and thoraic spine regions.    No midline skin abnormalities.    No atrophy of upper limb musculature.  Gait: Non-antalgic with Neutral ankle mechanics and Pes planus . Gait without trendelenberg, heel walking, toe walking, and tandem walking.    Tenderness:  No tenderness throughout the cervical spine upon spinous process palpation  + tenderness over the base of the occiput on the left  No bony deformities or step-offs palpated.   No tenderness over the posterior paraspinals in cervical spine on right  + right uppe trapezius  tenderness .    Range of Motion (* = with pain):  CERVICAL SPINE  Full AROM in flexion*, extension, sidebending*, and rotation*.    SHOULDER  Active flexion to 180° on left and 180° on right.  Active abduction to 180° on  left and 180° on right.  Active internal rotation to T7 on left and T7 on right.    Active external rotation to T4 on left and T7 on right.    No scapular dyskinesia or winging.    Strength Testing (* = with pain):  Sternocleidomastoid - 5/5 on left and 5/5 on right  Upper trapezius-  5/5 on left and 5/5 on right  Deltoid - 5/5 on left and 5/5 on right  Biceps - 5/5 on left and 5/5 on right  Triceps - 5/5 on left and 5/5 on right  Wrist extension - 5/5 on left and 5/5 on right  Wrist flexion - 5/5 on left and 5/5 on right   - 5/5 on left and 5/5 on right  Finger extension - 5/5 on left and 5/5 on right  Finger abduction - 5/5 on left and 5/5 on right    Structural Exam:  TART (Tissue texture abnormality, Asymmetry,  Restriction of motion and/or Tenderness) changes:    Head:   Cranial Rhythmic Impulse (CRI): restricted with Decreased amplitude  + sphenopalatine ganglion TTA  Strain Patterns: absent  Occipitoatlantal (OA) Joint: ES-right, R-left  Suture/Bone Restriction Side   Occipitomastoid (OM)  Present L   Parietal mastoid (PM) Absent    Petrojugular (PJ) Absent    Sphenosquamous pivot (SSP) Present L    Zygomaticotemporal (ZT) Absent    Zygomaticofrontal (ZF) Absent    Frontonasal Absent    [place] bone Absent    Parietal bone Absent    Frontal bone Absent       Cervical Spine   C1 TTA LEFT   C2 TTA LEFT   C3 ERS RIGHT   C4 Neutral   C5 Neutral   C6 ERS RIGHT   C7 FRS LEFT      Thoracic Spine   T1 FRS LEFT   T2 Neutral   T3 Neutral   T4 Neutral   T5 NS-left,R-right   T6 NS-left,R-right   T7 NS-left,R-right   T8 NS-left,R-right   T9 Neutral   T10 Neutral   T11 Neutral   T12 Neutral     Rib cage: R1 inhaled on right    Upper extremity: right upper trapezius TTA, right supraclavicular Herniated trigger point (HTP) fascial distortion       Key   F= Flexed   E = Extended   R = Rotated   S = Sidebent   TTA = tissue texture abnormality     Neurovascular Exam:  Spurling's - negative on left and negative on  right  Lhermitte's Sign - absent  Seated straight leg raise - negative on left and negative on right  Reflexes +2/4 and symmetric at the biceps, brachioradialis, and triceps bilaterally   Sensation intact to light touch in the C5-T1 dermatomes bilaterally.   Emiliana's sign- absent  Intact and symmetric radial pulses bilaterally.    Assessment:      Encounter Diagnoses   Name Primary?    Short lasting unilateral neuralgiform headache with conjunctival injection and tearing (SUNCT), not intractable Yes    Cervicalgia     Myalgia     Myofascial pain     Cervical somatic dysfunction     Cranial somatic dysfunction     Thoracic region somatic dysfunction     Rib cage region somatic dysfunction     Upper extremity somatic dysfunction         Plan:   1. Neck, right sided upper back myofascial pain, myalgia with underlying AIDA headaches as well as biomechanical restrictions of the upper kinetic chain and poor posture- improved  - OMT performed again today to help with symptom relief.   - Continue proper posture and work desk and desk accommodations to facilitate good posture.  - Continue follow up with Dr. Yeh in pain management for occipital nerve blocks as needed.   - X-ray images of cervical spine taken 5/2/24 (AP, lateral, bilateral obliques, and odontoid  flexion and extension views) showed No acute osseous abnormality seen. Straightening of the normal cervical lordosis which may be positional or related to muscle spasm. Images were personally reviewed with patient.    2. OMT 5-6 regions. Oral consent obtained.  Reviewed benefits and potential side effects.   - OMT indicated today due to signs and symptoms as well as local and remote somatic dysfunction findings and their related neurokinetic, lymphatic, fascial and/or arteriovenous body connections.   - OMT techniques used: Myofascial Release, Muscle Energy, High Velocity Low Amplitude, Still's Technique, and Cranial    - Treatment was tolerated well.  Improvement noted in segmental mobility post-treatment in dysfunctional regions. There were no adverse events and no complications immediately following treatment.     3. Pt. Continue the following HEP:  A) Cervicothoracic junction mobilization exercise: 10-15 reps, twice daily. Handout also given.    B) Sidelying reach and roll stretch: Laying on your side with arms extend out, pull back top arm and flex at the elbow, then rotate thoracic spine and open up arm and chest. Repeat for a total of 10 reps on each side, twice daily. Hand-out also given.   C) Scalene, upper trapezius, and levator scapulae stretches : hold neck sidebending stretch for 30 seconds in each plane, bilaterally, twice daily. Hand out given.    D) Seated anterior pelvic tilt exercise: rotate pelvis forward to sit on ischial tuberosities while maintaining neutral shoulder positioning. Repeat frequently throughout the day.      The patient was taught a homegoing physical therapy regimen as described above. The patient demonstrated understanding of the exercises and proper technique of their execution.     4. Follow-up in 6 weeks for reevaluation    5. Patient agreeable to today's plan and all questions were answered    This note is dictated using the M*Modal Fluency Direct word recognition program. There are word recognition mistakes that are occasionally missed on review.

## 2024-05-14 ENCOUNTER — PATIENT MESSAGE (OUTPATIENT)
Dept: PAIN MEDICINE | Facility: CLINIC | Age: 34
End: 2024-05-14
Payer: COMMERCIAL

## 2024-06-10 ENCOUNTER — TELEPHONE (OUTPATIENT)
Dept: PAIN MEDICINE | Facility: CLINIC | Age: 34
End: 2024-06-10
Payer: COMMERCIAL

## 2024-06-11 ENCOUNTER — PROCEDURE VISIT (OUTPATIENT)
Dept: PAIN MEDICINE | Facility: CLINIC | Age: 34
End: 2024-06-11
Payer: COMMERCIAL

## 2024-06-11 VITALS
HEART RATE: 88 BPM | DIASTOLIC BLOOD PRESSURE: 73 MMHG | BODY MASS INDEX: 31.49 KG/M2 | TEMPERATURE: 98 F | OXYGEN SATURATION: 100 % | SYSTOLIC BLOOD PRESSURE: 122 MMHG | HEIGHT: 67 IN | WEIGHT: 200.63 LBS | RESPIRATION RATE: 18 BRPM

## 2024-06-11 DIAGNOSIS — M54.81 BILATERAL OCCIPITAL NEURALGIA: Primary | ICD-10-CM

## 2024-06-11 PROCEDURE — 64405 NJX AA&/STRD GR OCPL NRV: CPT | Mod: 50,S$GLB,, | Performed by: ANESTHESIOLOGY

## 2024-06-11 PROCEDURE — 76942 ECHO GUIDE FOR BIOPSY: CPT | Mod: 26,S$GLB,, | Performed by: ANESTHESIOLOGY

## 2024-06-11 NOTE — PROCEDURES
Patient Name: Maricel Jha  MRN: [id number]    INFORMED CONSENT: The procedure, risks, benefits and options were discussed with patient. There are no contraindications to the procedure. The patient expressed understanding and agreed to proceed. The personnel performing the procedure was discussed. I verify that I personally obtained Maricel's consent prior to the start of the procedure and the signed consent can be found on the patient's chart.    Procedure Date: 06/11/2024    Anesthesia: Topical    Pre Procedure diagnosis:   1. Bilateral occipital neuralgia        Post-Procedure diagnosis: same      Sedation: None    PROCEDURE: Bilateral OCCIPITAL NERVE BLOCK UNDER U/S   The patient was placed in prone position. The site of pain and procedure were confirmed with the patient prior to starting the procedure. The patient's nuchal ridge of the occipital bone was identified and prepped with chlorhexidine. After performing time out A 27g gauge 1.5 inch was advanced through the skin and subcutaneous tissues lateral to C2 Between the Inferior oblique muscle and semispinalis capitis under ultrasound guidance using in-plane technique.  Aspiration for blood and CSF was negative.  A total of 8-10 ml of Bupivacaine 0.25% and 10 mg dexamethasone was injected.  There were no signs or symptoms of intravascular injection. No complications were evident. No specimens collected.      Blood Loss: Nill  Specimen: None    Gary Sahu M.D.  PGY-5  Interventional Pain Management Fellow  [facility]  Pager: [phone]    06/11/2024    I reviewed and edited the resident/fellow's note. I conducted my own interview and physical examination. I agree with the findings and documentation. I was present and supervising all critical portions of the procedure.      Osman Yeh MD

## 2024-06-17 ENCOUNTER — OFFICE VISIT (OUTPATIENT)
Dept: SPORTS MEDICINE | Facility: CLINIC | Age: 34
End: 2024-06-17
Payer: COMMERCIAL

## 2024-06-17 ENCOUNTER — PATIENT MESSAGE (OUTPATIENT)
Dept: PAIN MEDICINE | Facility: CLINIC | Age: 34
End: 2024-06-17
Payer: COMMERCIAL

## 2024-06-17 VITALS — SYSTOLIC BLOOD PRESSURE: 132 MMHG | DIASTOLIC BLOOD PRESSURE: 76 MMHG

## 2024-06-17 DIAGNOSIS — M99.01 CERVICAL (NECK) REGION SOMATIC DYSFUNCTION: ICD-10-CM

## 2024-06-17 DIAGNOSIS — M26.609 TMJ (TEMPOROMANDIBULAR JOINT DISORDER): ICD-10-CM

## 2024-06-17 DIAGNOSIS — G44.059 SHORT LASTING UNILATERAL NEURALGIFORM HEADACHE WITH CONJUNCTIVAL INJECTION AND TEARING (SUNCT), NOT INTRACTABLE: Primary | ICD-10-CM

## 2024-06-17 DIAGNOSIS — M99.00 SOMATIC DYSFUNCTION OF HEAD REGION: ICD-10-CM

## 2024-06-17 DIAGNOSIS — M99.02 SOMATIC DYSFUNCTION OF THORACIC REGION: ICD-10-CM

## 2024-06-17 DIAGNOSIS — M79.18 MYOFASCIAL PAIN: ICD-10-CM

## 2024-06-17 DIAGNOSIS — M99.07 UPPER EXTREMITY SOMATIC DYSFUNCTION: ICD-10-CM

## 2024-06-17 DIAGNOSIS — M54.2 CERVICALGIA: ICD-10-CM

## 2024-06-17 DIAGNOSIS — M99.08 SOMATIC DYSFUNCTION OF RIB CAGE REGION: ICD-10-CM

## 2024-06-17 PROCEDURE — 1159F MED LIST DOCD IN RCRD: CPT | Mod: CPTII,S$GLB,, | Performed by: NEUROMUSCULOSKELETAL MEDICINE & OMM

## 2024-06-17 PROCEDURE — 99214 OFFICE O/P EST MOD 30 MIN: CPT | Mod: 25,S$GLB,, | Performed by: NEUROMUSCULOSKELETAL MEDICINE & OMM

## 2024-06-17 PROCEDURE — 3075F SYST BP GE 130 - 139MM HG: CPT | Mod: CPTII,S$GLB,, | Performed by: NEUROMUSCULOSKELETAL MEDICINE & OMM

## 2024-06-17 PROCEDURE — 3078F DIAST BP <80 MM HG: CPT | Mod: CPTII,S$GLB,, | Performed by: NEUROMUSCULOSKELETAL MEDICINE & OMM

## 2024-06-17 PROCEDURE — 99999 PR PBB SHADOW E&M-EST. PATIENT-LVL III: CPT | Mod: PBBFAC,,, | Performed by: NEUROMUSCULOSKELETAL MEDICINE & OMM

## 2024-06-17 PROCEDURE — 97110 THERAPEUTIC EXERCISES: CPT | Mod: GP,S$GLB,, | Performed by: NEUROMUSCULOSKELETAL MEDICINE & OMM

## 2024-06-17 PROCEDURE — 98927 OSTEOPATH MANJ 5-6 REGIONS: CPT | Mod: S$GLB,,, | Performed by: NEUROMUSCULOSKELETAL MEDICINE & OMM

## 2024-06-17 NOTE — PROGRESS NOTES
Subjective:     Maricel Jha    Chief Complaint   Patient presents with    Follow-up     HPI    Maricel is a 34 y.o. female with PMHx of AIDA coming in today for follow up for right-sided headache/neck pain. Since last visit the pain has remained unchanged. Pt reports minimal relief with recent occipital nerve block. Reports increased headache today. The pain is better with stretching, prior OMT type treatments and worse with lack of sleep, stress. Pt. describes the pain as a 7/10. There has not been any new a fall/injury/ or traumas since last visit.  Pt. denies any new musculoskeletal complaints at this time. Denies dizziness/lightheadedness, weakness, changes in balance.     Office note from 5/13/24 reviewed    Procedures reviewed:   6/11/24- Bilateral OCCIPITAL NERVE BLOCK UNDER U/S  Dr. Yeh      PAST MEDICAL HISTORY: History reviewed. No pertinent past medical history.  PAST SURGICAL HISTORY: History reviewed. No pertinent surgical history.  FAMILY HISTORY: No family history on file.    SOCIAL HISTORY:   Social History     Socioeconomic History    Marital status:    Tobacco Use    Smoking status: Never    Smokeless tobacco: Never     Social Determinants of Health     Financial Resource Strain: Low Risk  (4/16/2024)    Received from [facility]    Overall Financial Resource Strain (CARDIA)     Difficulty of Paying Living Expenses: Not hard at all   Food Insecurity: No Food Insecurity (4/16/2024)    Received from [facility]    Hunger Vital Sign     Worried About Running Out of Food in the Last Year: Never true     Ran Out of Food in the Last Year: Never true   Transportation Needs: No Transportation Needs (4/16/2024)    Received from [facility]    PRAPARE - Transportation     Lack of Transportation (Medical): No     Lack of Transportation (Non-Medical): No   Physical Activity: Insufficiently Active (4/16/2024)    Received from [facility]    Exercise Vital Sign     Days of Exercise per Week: 2  days     Minutes of Exercise per Session: 60 min   Stress: No Stress Concern Present (4/16/2024)    Received from [facility] - Occupational Stress Questionnaire     Feeling of Stress : Only a little     MEDICATIONS:   Current Outpatient Medications:     AJOVY AUTOINJECTOR 225 mg/1.5 mL autoinjector, Inject into the skin., Disp: , Rfl:     carBAMazepine 100 mg/5 mL (5 mL) Susp, , Disp: , Rfl:     cetirizine (ZYRTEC) 10 MG tablet, , Disp: , Rfl:     cholecalciferol, vitamin D3, 125 mcg (5,000 unit) capsule, , Disp: , Rfl:     lamoTRIgine 100 mg TbDL, , Disp: , Rfl:     magnesium 200 mg Tab, , Disp: , Rfl:     montelukast (SINGULAIR) 10 mg tablet, , Disp: , Rfl:     orphenadrine/aspirin/caffeine (NORGESIC FORTE ORAL), , Disp: , Rfl:     progesterone (PROMETRIUM) 100 MG capsule, Take 100 mg by mouth once daily., Disp: , Rfl:     rizatriptan (MAXALT) 10 MG tablet, Take by mouth., Disp: , Rfl:     ubrogepant (UBRELVY ORAL), Take by mouth., Disp: , Rfl:     vitamin B comp and C no.3 (B COMPLEX PLUS VITAMIN C) 15-10-50-5-300 mg Cap, , Disp: , Rfl:     ALLERGIES:   Review of patient's allergies indicates:   Allergen Reactions    Nsaids (non-steroidal anti-inflammatory drug) Rash and Shortness Of Breath    Sumatriptan Other (See Comments)     Severe neck and back pain    Ibuprofen Other (See Comments)     Objective:     VITAL SIGNS: /76    General    Vitals reviewed.  Constitutional: She is oriented to person, place, and time. She appears well-developed and well-nourished.   Neurological: She is alert and oriented to person, place, and time.   Psychiatric: She has a normal mood and affect. Her behavior is normal.           MUSCULOSKELETAL EXAM:  Cervical Spine: right cervical region    Observation:    Posture:  Upright  No obvious shoulder un-leveling while standing.    No edema, erythema, or ecchymosis noted in cervical and thoraic spine regions.    No midline skin  abnormalities.    No atrophy of upper limb musculature.  Gait: Non-antalgic with Neutral ankle mechanics and Pes planus . Gait without trendelenberg, heel walking, toe walking, and tandem walking.    Tenderness:  No tenderness throughout the cervical spine upon spinous process palpation  No tenderness over the base of the occiput   No bony deformities or step-offs palpated.   No tenderness over the posterior paraspinals in cervical spine on right  + bilateral upper trapezius tenderness .    Range of Motion (* = with pain):  CERVICAL SPINE  Full AROM in flexion, extension, sidebending, and rotation.    SHOULDER  Active flexion to 180° on left and 180° on right.  Active abduction to 180° on left and 180° on right.  Active internal rotation to T7 on left and T7 on right.    Active external rotation to T4 on left and T7 on right.    No scapular dyskinesia or winging.    Strength Testing (* = with pain):  Sternocleidomastoid - 5/5 on left and 5/5 on right  Upper trapezius-  5/5 on left and 5/5 on right  Deltoid - 5/5 on left and 5/5 on right  Biceps - 5/5 on left and 5/5 on right  Triceps - 5/5 on left and 5/5 on right  Wrist extension - 5/5 on left and 5/5 on right  Wrist flexion - 5/5 on left and 5/5 on right   - 5/5 on left and 5/5 on right  Finger extension - 5/5 on left and 5/5 on right  Finger abduction - 5/5 on left and 5/5 on right    Structural Exam:  TART (Tissue texture abnormality, Asymmetry,  Restriction of motion and/or Tenderness) changes:    Head:   Cranial Rhythmic Impulse (CRI): restricted with Decreased amplitude  + sphenopalatine ganglion TTA on right  Strain Patterns: absent  Occipitoatlantal (OA) Joint: TTA left and right  Suture/Bone Restriction Side   Occipitomastoid (OM)  Absent    Parietal mastoid (PM) Absent    Petrojugular (PJ) Absent    Sphenosquamous pivot (SSP) Present R   Zygomaticotemporal (ZT) Absent    Zygomaticofrontal (ZF) Absent    Frontonasal Absent    Dyer bone Absent     Parietal bone Absent    Frontal bone Absent      Muscle Tissue Texture Abnormality Side   Lateral pterygoid Present R   Medial pterygoid Present R      Cervical Spine   C1 TTA RIGHT   C2 TTA RIGHT   C3 ERS RIGHT   C4 Neutral   C5 Neutral   C6 ERS RIGHT   C7 FRS LEFT      Thoracic Spine   T1 FRS LEFT   T2 Neutral   T3 Neutral   T4 Neutral   T5 Neutral   T6 Neutral   T7 FRS RIGHT   T8 FRS RIGHT   T9 Neutral   T10 Neutral   T11 Neutral   T12 Neutral     Rib cage: R1 inhaled on right, R7 external torsion on right    Upper extremity: right thoracic outlet TTA    Key   F= Flexed   E = Extended   R = Rotated   S = Sidebent   TTA = tissue texture abnormality     Neurovascular Exam:  Spurling's - negative on left and negative on right  Lhermitte's Sign - absent  Seated straight leg raise - negative on left and negative on right  Reflexes +2/4 and symmetric at the biceps, brachioradialis, and triceps bilaterally   Sensation intact to light touch in the C5-T1 dermatomes bilaterally.   Emiliana's sign- absent  Intact and symmetric radial pulses bilaterally.    Assessment:      Encounter Diagnoses   Name Primary?    Short lasting unilateral neuralgiform headache with conjunctival injection and tearing (SUNCT), not intractable Yes    Cervicalgia     TMJ (temporomandibular joint disorder)     Myofascial pain     Somatic dysfunction of head region     Somatic dysfunction of thoracic region     Cervical (neck) region somatic dysfunction     Upper extremity somatic dysfunction     Somatic dysfunction of rib cage region           Plan:   1. Neck, right sided upper back myofascial pain, myalgia with underlying AIDA headaches as well as biomechanical restrictions of the upper kinetic chain and poor posture. Underlying component of TMJ disorder suspected as well.   - OMT performed again today to help with symptom relief.   - Continue proper posture and work desk and desk accommodations to facilitate good posture.  - Continue follow up  with Dr. Yeh in pain management, with discussion of possible sphenopalatine ganglion block  - Contact info for [facility] given for further TMJ eval  - X-ray images of cervical spine taken 5/2/24 (AP, lateral, bilateral obliques, and odontoid  flexion and extension views) showed No acute osseous abnormality seen. Straightening of the normal cervical lordosis which may be positional or related to muscle spasm.     2. OMT 5-6 regions. Oral consent obtained.  Reviewed benefits and potential side effects.   - OMT indicated today due to signs and symptoms as well as local and remote somatic dysfunction findings and their related neurokinetic, lymphatic, fascial and/or arteriovenous body connections.   - OMT techniques used: Myofascial Release, Muscle Energy, High Velocity Low Amplitude, Still's Technique, and Cranial    - Treatment was tolerated well. Improvement noted in segmental mobility post-treatment in dysfunctional regions. There were no adverse events and no complications immediately following treatment.     3. Pt. Continue the following HEP:  A) Cervicothoracic junction mobilization exercise: 10-15 reps, twice daily. Handout also given.    B) Sidelying reach and roll stretch: Laying on your side with arms extend out, pull back top arm and flex at the elbow, then rotate thoracic spine and open up arm and chest. Repeat for a total of 10 reps on each side, twice daily. Hand-out also given.   C) Scalene, upper trapezius, and levator scapulae stretches : hold neck sidebending stretch for 30 seconds in each plane, bilaterally, twice daily. Hand out given.    D) Seated anterior pelvic tilt exercise: rotate pelvis forward to sit on ischial tuberosities while maintaining neutral shoulder positioning. Repeat frequently throughout the day.      The patient was taught a homegoing physical therapy regimen as described above. The patient demonstrated understanding of the exercises and proper technique of  their execution.     4. Follow-up as needed if pain deteriorates or new issue arises    5. Patient agreeable to today's plan and all questions were answered    This note is dictated using the M*Modal Fluency Direct word recognition program. There are word recognition mistakes that are occasionally missed on review.    Total time spent face-to face with patient counseling or coordinating care including prognosis, differential diagnosis, risks and benefits of treatment, instructions, compliance risk reductions as well as non-face-to-face time personally spent reviewing medial record, medical documentation, and coordination of care.     EST MINUTES X   96310 10-19    [id number] 20-29    [id number] 30-39 x   [id number] 40-54    NEW     [id number] 15-29    [id number] 30-44    [id number] 45-59    [id number] 60-74    PHONE      5-10    [id number] 11-20    [id number] 21-30

## 2024-07-01 ENCOUNTER — OFFICE VISIT (OUTPATIENT)
Dept: PAIN MEDICINE | Facility: CLINIC | Age: 34
End: 2024-07-01
Payer: COMMERCIAL

## 2024-07-01 ENCOUNTER — TELEPHONE (OUTPATIENT)
Dept: PAIN MEDICINE | Facility: CLINIC | Age: 34
End: 2024-07-01

## 2024-07-01 VITALS
RESPIRATION RATE: 18 BRPM | HEIGHT: 67 IN | BODY MASS INDEX: 31.49 KG/M2 | WEIGHT: 200.63 LBS | HEART RATE: 76 BPM | TEMPERATURE: 98 F | OXYGEN SATURATION: 100 % | SYSTOLIC BLOOD PRESSURE: 121 MMHG | DIASTOLIC BLOOD PRESSURE: 62 MMHG

## 2024-07-01 DIAGNOSIS — M79.18 MYOFASCIAL PAIN: ICD-10-CM

## 2024-07-01 DIAGNOSIS — G44.059: ICD-10-CM

## 2024-07-01 DIAGNOSIS — M54.81 OCCIPITAL NEURALGIA OF RIGHT SIDE: Primary | ICD-10-CM

## 2024-07-01 PROCEDURE — 1159F MED LIST DOCD IN RCRD: CPT | Mod: CPTII,S$GLB,, | Performed by: ANESTHESIOLOGY

## 2024-07-01 PROCEDURE — 3078F DIAST BP <80 MM HG: CPT | Mod: CPTII,S$GLB,, | Performed by: ANESTHESIOLOGY

## 2024-07-01 PROCEDURE — 99999 PR PBB SHADOW E&M-EST. PATIENT-LVL IV: CPT | Mod: PBBFAC,,, | Performed by: ANESTHESIOLOGY

## 2024-07-01 PROCEDURE — 3074F SYST BP LT 130 MM HG: CPT | Mod: CPTII,S$GLB,, | Performed by: ANESTHESIOLOGY

## 2024-07-01 PROCEDURE — 99214 OFFICE O/P EST MOD 30 MIN: CPT | Mod: S$GLB,,, | Performed by: ANESTHESIOLOGY

## 2024-07-01 PROCEDURE — 1160F RVW MEDS BY RX/DR IN RCRD: CPT | Mod: CPTII,S$GLB,, | Performed by: ANESTHESIOLOGY

## 2024-07-01 PROCEDURE — 3008F BODY MASS INDEX DOCD: CPT | Mod: CPTII,S$GLB,, | Performed by: ANESTHESIOLOGY

## 2024-07-01 NOTE — PROGRESS NOTES
Established Visit    PCP: Jackie Valenzuela MD    REFERRING PHYSICIAN: No ref. provider found    CHIEF COMPLAINT: Headaches    Original HISTORY OF PRESENT ILLNESS: Maricel Jha presents to the clinic for the evaluation of the above pain. The pain started 3 years ago.     Original Pain Description:  The patient is a 34 year old female with pmh of AIDA who presents for evaluation and treatment of headaches. The headaches started 3 years ago with no inciting factor. The pain is right sided and is located in the upper neck/base of the skull and spread towards the right side of the face. Pain in the back is described as a soreness, pain in the front is more of a stabbing and searing pain.  Often she will get episodes of tearing and facial flushing. Exacerbating factors: Stress, wind, brushing teeth, brushing hair. Mitigating factors: medication. Symptoms interfere with work and quality of life. The patient feels like symptoms have been unchanged. No photophobia, nausea, vomiting, auras.     She recently moved from [place] to [place]. She see's a neurologist in [place] who researches AIDA and is happy with her care there. Her neurologist over there manages her medications. She gets cervical trigger point injections and greater occipital nerve blocks about every 6-8 weeks. These give her about 75% relief for 4 weeks. Steroids are added about every other injection. She has had 6 injections so far, last one was earlier this week. Overall, she is happy with her treatment and wants to continue as is for now.       Original PAIN SCORES:  Best: Pain is 2  Worst: Pain is 9  Current: Pain is 4        7/1/2024     3:50 PM   Last 3 PDI Scores   Pain Disability Index (PDI) 49       INTERVAL HISTORY: (Newest visit at the bottom)   Interval History (7/1/2024):   34 year old female returns in follow up for occipital neuralgia. Patient had bilateral occipital nerve blocks on  6/17/2024. Patient reports no benefit. She localizes pain to the right occipital prominence. Typically got good benefit with landmark guided procedure. No benefit with US guided procedure. Patient still follows with her [facility]rologist via virtual visits.  Patient denies recent falls, trauma, hospitalizations, or infections.     6 weeks of Conservative therapy:  PT: n/a (Must include dates)  HEP: n/a      Treatments / Medications: (Ice/Heat/NSAIDS/APAP/etc):  Carbamazepine  Ajovy  Maxalt  Ubrelvy  Lamotrigine      Interventional Pain Procedures: (Previous injections)  Occipital nerve blocks and cervical trigger point injections    History reviewed. No pertinent past medical history.  History reviewed. No pertinent surgical history.  Social History     Socioeconomic History    Marital status:    Tobacco Use    Smoking status: Never    Smokeless tobacco: Never     Social Determinants of Health     Financial Resource Strain: Low Risk  (4/16/2024)    Received from [facility]    Overall Financial Resource Strain (CARDIA)     Difficulty of Paying Living Expenses: Not hard at all   Food Insecurity: No Food Insecurity (4/16/2024)    Received from [facility]    Hunger Vital Sign     Worried About Running Out of Food in the Last Year: Never true     Ran Out of Food in the Last Year: Never true   Transportation Needs: No Transportation Needs (4/16/2024)    Received from [facility]    PRAPARE - Transportation     Lack of Transportation (Medical): No     Lack of Transportation (Non-Medical): No   Physical Activity: Insufficiently Active (4/16/2024)    Received from [facility]    Exercise Vital Sign     Days of Exercise per Week: 2 days     Minutes of Exercise per Session: 60 min   Stress: No Stress Concern Present (4/16/2024)    Received from [facility]    [employer] [employer] of [employer]ational [facility] - Occupational Stress Questionnaire     Feeling of Stress : Only a little     No family history on file.    Review of  "patient's allergies indicates:   Allergen Reactions    Nsaids (non-steroidal anti-inflammatory drug) Rash and Shortness Of Breath    Sumatriptan Other (See Comments)     Severe neck and back pain    Ibuprofen Other (See Comments)       Current Outpatient Medications   Medication Sig    AJOVY AUTOINJECTOR 225 mg/1.5 mL autoinjector Inject into the skin.    carBAMazepine 100 mg/5 mL (5 mL) Susp     cetirizine (ZYRTEC) 10 MG tablet     cholecalciferol, vitamin D3, 125 mcg (5,000 unit) capsule     lamoTRIgine 100 mg TbDL     magnesium 200 mg Tab     montelukast (SINGULAIR) 10 mg tablet     orphenadrine/aspirin/caffeine (NORGESIC FORTE ORAL)     progesterone (PROMETRIUM) 100 MG capsule Take 100 mg by mouth once daily.    rizatriptan (MAXALT) 10 MG tablet Take by mouth.    ubrogepant (UBRELVY ORAL) Take by mouth.    vitamin B comp and C no.3 (B COMPLEX PLUS VITAMIN C) 15-10-50-5-300 mg Cap      No current facility-administered medications for this visit.       ROS:  GENERAL: No fever. No chills. No fatigue.   HEENT: + tearing  CV: Denies chest pain.   PULM: Denies of shortness of breath.  GI: Denies constipation. No diarrhea. No abdominal pain. Denies nausea. Denies vomiting. No blood in stool.  HEME: Denies bleeding problems.  : Denies urgency. No painful urination. No blood in urine.  MS: + neck pain  SKIN: Denies rash.   NEURO: + headaches, no weakness  PSYCH:  Denies difficulty sleeping. No anxiety. Denies depression. No suicidal thoughts.       VITALS:   Vitals:    07/01/24 1551   BP: 121/62   Pulse: 76   Resp: 18   Temp: 98 °F (36.7 °C)   SpO2: 100%   Weight: 91 kg (200 lb 9.9 oz)   Height: 5' 7" (1.702 m)   PainSc:   7   PainLoc: Head         PHYSICAL EXAM:   GENERAL: Well appearing, in no acute distress, alert and oriented x3.  PSYCH:  Mood and affect appropriate.  SKIN: Skin color, texture, turgor normal, no rashes or lesions.  HEENT:  Normocephalic, atraumatic. Cranial nerves grossly intact. Pain to palpation " over right occipital ridge.  NECK: Pain to palpation over cervical paraspinal and periscapular musculature. Pain with muscle stretch and palpation. +trigger points.    Full ROM with flexion, extension, lateral rotation. Lhermitte's and Spurling's are negative  PULM: No evidence of respiratory difficulty, symmetric chest rise.  GI:  Non-distended  EXTREMITIES: No deformities, edema, or skin discoloration.   NEURO: CN II-XII are intact. Sensation is equal and appropriate bilaterally. Bilateral upper and lower extremity strength is normal and symmetric. Bilateral upper and lower extremity coordination and muscle stretch reflexes are physiologic and symmetric. Plantar response are downgoing. Negative Hoffmans.No clonus  GAIT: normal.      IMAGING:    EXAM: Brain w/wo contrast MRI     DATE: 5/29/2021 11:00     INDICATION:  - concern for brainstem MS/infalmmatory/demyelinating condition, patient with trigeminal pain/redness x4 weeks. please thin cuts brain stem.     COMPARISON: CTA 5/29/2021     TECHNIQUE: Multiplanar multisequence images of the brain were obtained before and after the intravenous administration of contrast material.     DISCUSSION:     No acute parenchymal abnormality.   No hydrocephalus, midline shift, mass effect, restricted diffusion or acute hemorrhage.   Pineal region, pituitary gland, cranio cervical junction, orbits and internal auditory canals are unremarkable.   Major intracranial flow-voids are well-maintained.   No mass or abnormal enhancement. The cisternal segments of the trigeminal nerves are unremarkable with no abnormal enhancement.   No osseous lesions.     IMPRESSION:     Unremarkable MRI of the brain with and without contrast.       ASSESSMENT: 34 y.o. year old female with pain, consistent with:    Encounter Diagnoses   Name Primary?    Occipital neuralgia of right side Yes    Myofascial pain     Short lasting unilateral neuralgiform headache with conjunctival injection and tearing           DISCUSSION: Maricel Jha is a 34 year old who comes to us with AIDA and occipital neuralgia. She has received significant relief with her medications and cervical TPI and occipital nerve blocks and is happy with her treatment as is currently.       PLAN:  Schedule for landmark guided right occipital nerve block. Consider TPIs at that time as well  Schedule for Right Sphenopalatine block under fluoroscopic guidance  XR Cervical spine reviewed.  Recommend that she continue to follow up with her neurologist in [facility] for occipital nerve block ASAP and 2 weeks after sphenopalatine block      I would like to thank No ref. provider found for the opportunity to assist in the care of this patient. We had a very nice visit and I look forward to continuing their care. Please let me know if I can be of further assistance.     Wilber Duran  07/01/2024  [employer] pain fellow     I spent a total of 30 minutes on the day of the visit.  This includes face to face time and non-face to face time preparing to see the patient by reviewing previous labs/imaging, obtaining and/or reviewing separately obtained history, documenting clinical information in the electronic or other health record, independently interpreting results and communicating results to the patient/family/caregiver.    Osman Yeh

## 2024-07-02 ENCOUNTER — PATIENT MESSAGE (OUTPATIENT)
Dept: PAIN MEDICINE | Facility: OTHER | Age: 34
End: 2024-07-02
Payer: COMMERCIAL

## 2024-07-02 ENCOUNTER — PROCEDURE VISIT (OUTPATIENT)
Dept: PAIN MEDICINE | Facility: CLINIC | Age: 34
End: 2024-07-02
Payer: COMMERCIAL

## 2024-07-02 VITALS
DIASTOLIC BLOOD PRESSURE: 68 MMHG | RESPIRATION RATE: 18 BRPM | BODY MASS INDEX: 31.42 KG/M2 | TEMPERATURE: 99 F | WEIGHT: 200.63 LBS | HEART RATE: 72 BPM | SYSTOLIC BLOOD PRESSURE: 116 MMHG | OXYGEN SATURATION: 98 %

## 2024-07-02 DIAGNOSIS — M54.81 OCCIPITAL NEURALGIA OF RIGHT SIDE: Primary | ICD-10-CM

## 2024-07-02 DIAGNOSIS — M79.18 MYOFASCIAL PAIN: ICD-10-CM

## 2024-07-02 DIAGNOSIS — G44.059: Primary | ICD-10-CM

## 2024-07-02 NOTE — PROCEDURES
Patient Name: Maricel Jha  MRN: [id number]    INFORMED CONSENT: The procedure, risks, benefits and options were discussed with patient. There are no contraindications to the procedure. The patient expressed understanding and agreed to proceed. The personnel performing the procedure was discussed. I verify that I personally obtained Maricel's consent prior to the start of the procedure and the signed consent can be found on the patient's chart.    Procedure Date: 07/02/2024    Anesthesia: Topical    Pre Procedure diagnosis:   1. Occipital neuralgia of right side        Post-Procedure diagnosis: same      Sedation: None    PROCEDURE: Right OCCIPITAL NERVE BLOCK  The patient was placed in a seated position. The site of pain and procedure were confirmed with the patient prior to starting the procedure. The patient's nuchal ridge of the occipital bone was identified and prepped with chlorhexidine. After performing time out A 25g gauge 1.5 inch was advanced through the skin and subcutaneous tissues lateral to the occipital protuberance in the area of the Greater Occipital Nerve.  Aspiration for blood and CSF was negative.  A total of 10 ml of Bupivacaine 0.25% was injected.  There were no signs or symptoms of intravascular injection. No complications were evident. No specimens collected.      Blood Loss: Nill  Specimen: None    Wilber Duran MD     I reviewed and edited the resident/fellow's note. I conducted my own interview and physical examination. I agree with the findings and documentation. I was present and supervising all critical portions of the procedure.      Osman Yeh MD

## 2024-07-09 ENCOUNTER — PATIENT MESSAGE (OUTPATIENT)
Dept: PAIN MEDICINE | Facility: CLINIC | Age: 34
End: 2024-07-09
Payer: COMMERCIAL

## 2024-07-09 ENCOUNTER — OFFICE VISIT (OUTPATIENT)
Dept: SPORTS MEDICINE | Facility: CLINIC | Age: 34
End: 2024-07-09
Payer: COMMERCIAL

## 2024-07-09 ENCOUNTER — HOSPITAL ENCOUNTER (OUTPATIENT)
Dept: RADIOLOGY | Facility: HOSPITAL | Age: 34
Discharge: HOME | End: 2024-07-09
Attending: NEUROMUSCULOSKELETAL MEDICINE & OMM
Payer: COMMERCIAL

## 2024-07-09 VITALS
WEIGHT: 204.56 LBS | HEART RATE: 67 BPM | BODY MASS INDEX: 32.11 KG/M2 | SYSTOLIC BLOOD PRESSURE: 103 MMHG | HEIGHT: 67 IN | DIASTOLIC BLOOD PRESSURE: 64 MMHG

## 2024-07-09 DIAGNOSIS — S86.111A GASTROCNEMIUS STRAIN, RIGHT, INITIAL ENCOUNTER: ICD-10-CM

## 2024-07-09 DIAGNOSIS — M25.561 RIGHT KNEE PAIN: ICD-10-CM

## 2024-07-09 DIAGNOSIS — M25.561 ACUTE PAIN OF RIGHT KNEE: Primary | ICD-10-CM

## 2024-07-09 DIAGNOSIS — M99.06 SOMATIC DYSFUNCTION OF LOWER EXTREMITY: ICD-10-CM

## 2024-07-09 DIAGNOSIS — M99.05 SOMATIC DYSFUNCTION OF PELVIC REGION: ICD-10-CM

## 2024-07-09 DIAGNOSIS — M79.10 MYALGIA: ICD-10-CM

## 2024-07-09 DIAGNOSIS — S76.311A STRAIN OF RIGHT HAMSTRING, INITIAL ENCOUNTER: ICD-10-CM

## 2024-07-09 DIAGNOSIS — M99.03 SOMATIC DYSFUNCTION OF LUMBAR REGION: ICD-10-CM

## 2024-07-09 PROCEDURE — 99999 PR PBB SHADOW E&M-EST. PATIENT-LVL IV: CPT | Mod: PBBFAC,,, | Performed by: NEUROMUSCULOSKELETAL MEDICINE & OMM

## 2024-07-09 PROCEDURE — 73562 X-RAY EXAM OF KNEE 3: CPT | Mod: 26,LT,, | Performed by: RADIOLOGY

## 2024-07-09 PROCEDURE — 73562 X-RAY EXAM OF KNEE 3: CPT | Mod: TC,LT

## 2024-07-09 PROCEDURE — 73564 X-RAY EXAM KNEE 4 OR MORE: CPT | Mod: TC,RT

## 2024-07-09 PROCEDURE — 73564 X-RAY EXAM KNEE 4 OR MORE: CPT | Mod: 26,RT,, | Performed by: RADIOLOGY

## 2024-07-09 NOTE — PROGRESS NOTES
Subjective:     Maricel Jha    Chief Complaint   Patient presents with    Head Injury    Neck Pain     HPI    Maricel is a 34 y.o. female with PMHx of AIDA coming in today for follow up for right-sided headache/neck pain. Since last visit the pain has Improved with occipital nerve block. The pain is better with stretching, prior OMT type treatments and worse with lack of sleep, stress. Pt. describes the pain as a 0/10 currently.     Pt reports right knee injury 1 week ago playing pickleball. She reports she may have hyperextended her knee but not sure of a specific KAL[place]NA. She describes the pain as a 6/10 achy pain that does not radiate. Pt localizes the pain to the posterior knee and calf. The pain is better with movement and worse with stairs, sitting. There has not been any new a fall/injury/ or traumas since last visit. Pt. denies any new musculoskeletal complaints at this time. Denies dizziness/lightheadedness, weakness, changes in balance. Of note, pt has ibuprofen allergy, has not tried any other NSAIDs.     Office note from 6/17/24 reviewed    Procedures reviewed:   6/11/24- Bilateral OCCIPITAL NERVE BLOCK UNDER U/S  Dr. Yeh  7/2/24- Right OCCIPITAL NERVE BLOCK  Dr. Yeh    PAST MEDICAL HISTORY: History reviewed. No pertinent past medical history.  PAST SURGICAL HISTORY: History reviewed. No pertinent surgical history.  FAMILY HISTORY: No family history on file.    SOCIAL HISTORY:   Social History     Socioeconomic History    Marital status:    Tobacco Use    Smoking status: Never    Smokeless tobacco: Never     Social Determinants of Health     Financial Resource Strain: Low Risk  (7/6/2024)    Overall Financial Resource Strain (CARDIA)     Difficulty of Paying Living Expenses: Not hard at all   Food Insecurity: No Food Insecurity (7/6/2024)    Hunger Vital Sign     Worried About Running Out of Food in the Last Year: Never true     Ran Out of Food in the Last Year: Never true    Transportation Needs: No Transportation Needs (4/16/2024)    Received from [facility]    PRAPARE - Transportation     Lack of Transportation (Medical): No     Lack of Transportation (Non-Medical): No   Physical Activity: Sufficiently Active (7/6/2024)    Exercise Vital Sign     Days of Exercise per Week: 3 days     Minutes of Exercise per Session: 90 min   Recent Concern: Physical Activity - Insufficiently Active (4/16/2024)    Received from [facility]    Exercise Vital Sign     Days of Exercise per Week: 2 days     Minutes of Exercise per Session: 60 min   Stress: No Stress Concern Present (7/6/2024)    [employer] - Occupational Stress Questionnaire     Feeling of Stress : Not at all   Housing Stability: Unknown (7/6/2024)    Housing Stability Vital Sign     Unable to Pay for Housing in the Last Year: No     MEDICATIONS:   Current Outpatient Medications:     AJOVY AUTOINJECTOR 225 mg/1.5 mL autoinjector, Inject into the skin., Disp: , Rfl:     carBAMazepine 100 mg/5 mL (5 mL) Susp, , Disp: , Rfl:     cetirizine (ZYRTEC) 10 MG tablet, , Disp: , Rfl:     cholecalciferol, vitamin D3, 125 mcg (5,000 unit) capsule, , Disp: , Rfl:     lamoTRIgine 100 mg TbDL, , Disp: , Rfl:     magnesium 200 mg Tab, , Disp: , Rfl:     montelukast (SINGULAIR) 10 mg tablet, , Disp: , Rfl:     orphenadrine/aspirin/caffeine (NORGESIC FORTE ORAL), , Disp: , Rfl:     progesterone (PROMETRIUM) 100 MG capsule, Take 100 mg by mouth once daily., Disp: , Rfl:     rizatriptan (MAXALT) 10 MG tablet, Take by mouth., Disp: , Rfl:     ubrogepant (UBRELVY ORAL), Take by mouth., Disp: , Rfl:     vitamin B comp and C no.3 (B COMPLEX PLUS VITAMIN C) 15-10-50-5-300 mg Cap, , Disp: , Rfl:     ALLERGIES:   Review of patient's allergies indicates:   Allergen Reactions    Nsaids (non-steroidal anti-inflammatory drug) Rash and Shortness Of Breath    Sumatriptan Other (See Comments)     Severe neck and back pain    Ibuprofen Other  "(See Comments)     Objective:     VITAL SIGNS: /64   Pulse 67   Ht 5' 7" (1.702 m)   Wt 92.8 kg (204 lb 9.4 oz)   BMI 32.04 kg/m²    General    Vitals reviewed.  Constitutional: She is oriented to person, place, and time. She appears well-developed and well-nourished.   Neurological: She is alert and oriented to person, place, and time.   Psychiatric: She has a normal mood and affect. Her behavior is normal.           MUSCULOSKELETAL EXAM:      right KNEE EXAMINATION   Affected side is compared to contralateral knee     Observation:  No edema, erythema, ecchymosis, or effusion noted.  No muscle atrophy of the thighs and calves noted.  No obvious bony deformities noted.   No Genu valgus/varum noted.  No recurvatum noted.    No tibial internal/external torsion.    Posture:  Upright  Gait: Right antalgic with Neutral ankle mechanics and Neutral medial arch    Tenderness:  Patella - none    Lateral joint line - none  Quad tendon - none   Medial joint line - +  Patellar tendon - none  Medial plica - none  Tibial tubercle - none   Lateral plica - none  Pes anserine - none   MCL prox - none  Distal ITB - none   MCL distal - none  MFC - none    LCL prox - none  LFC - none    LCL distal - none  Tibia - none    Fibula - none    + medial distal hamstring tenderness  + media gastrocnemius tenderness    No obvious bursae, plicae, popliteal cysts, or tendon derangement palpated.          ROM (* = with pain):   Active extension to 0° on left without hyperextension, lag, crepitus, or patellar J sign.   Active extension to 0° on right without hyperextension, lag, crepitus, or patellar J sign.  Active flexion to 135° on left and 135° on right    Strength(* = with pain):  Knee Flexion - 5/5 on left and 5/5 on right*  Knee Extension - 5/5 on left and 5/5 on right  Hip Flexion - 5/5 on left and 5/5 on right  Hip Extension - 5/5 on left and 5/5 on right  Ankle dorsiflexion - 5/5 on left and 5/5 on right  Ankle Plantarflexion - " 5/5 on left and 5/5 on right*, less so with knee flexed    Patellofemoral Exam:   Patellar ballottement - negative  Bulge sign - negative  Patellar grind - negative    No patellar laxity with medial and lateral translation   No apprehension with medial and lateral patellar translation.     Meniscus Testing:     No pain with terminal extension and flexion at joint lines.  Lidias test - negative   Thesaly test - negative  Bounce home test - negative    Ligament Testing:  Lachman's test - negative  No laxity with anterior drawer.  No laxity with posterior drawer.    No posterior sag sign.   No laxity with varus testing at 0 and 30 degrees.  No laxity with valgus testing at 0 and 30 degrees.    IT band testing:  Dawns test - negative   Noble Compression test - negative    TART (Tissue texture abnormality, Asymmetry,  Restriction of motion and/or Tenderness) changes:     Lumbar Spine   L1 Neutral   L2 Neutral   L3 Neutral   L4 FRS RIGHT   L5 FRS RIGHT       Pelvis:  Innominate:Right anterior rotation  Pubic bone:Right inferior pubic shear    Sacrum:Neutral    Lower extremity:  Right distal medial hamstring and right medial gastrocnemius Herniated trigger point (HTP) fascial distortions and medial posterior knee Trigger band (TB) fascial distortion     Key   F= Flexed   E = Extended   R = Rotated   S = Sidebent   TTA = tissue texture abnormality         Neurovascular Examination:   Sensation intact to light touch in the obturator, lateral/intermediate/medial/posterior femoral cutaneous, saphenous, and common peroneal nerves bilaterally.  Motor Function:    Fully intact motor function at hip, knee, foot and ankle.  DTRs: 2+/4 reflexes at L4 and S1 dermatomes.   Negative seated straight leg raise bilaterally.    Pulses intact at the DP and PT arteries bilaterally.    Capillary refill intact <2 seconds in all toes bilaterally.    IMAGIN. X-ray ordered due to right knee pain. (AP bilateral standing, PA bilateral  standing in flexion, bilateral merchants, and  right lateral views) taken today.   2. X-ray images were reviewed personally by me and then directly with patient.  3. FINDINGS: X-ray images obtained demonstrate  that the joint spaces are maintained. Bony structures are intact. No joint effusion on the right.   4. IMPRESSION: No pathology or irregularities appreciated.     Assessment:      Encounter Diagnoses   Name Primary?    Acute pain of right knee Yes    Strain of right hamstring, initial encounter     Gastrocnemius strain, right, initial encounter     Myalgia     Somatic dysfunction of lumbar region     Somatic dysfunction of pelvic region     Somatic dysfunction of lower extremity         Plan:   1. Acute right knee pain from pickleball injury likely secondary to medial hamstring and medial gastrocnemius muscle strains with full strength on exam and no associated significant ecchymosis.  Today's right knee x-rays were negative and no ligamentous laxity of the knee appreciated on physical exam.  - OMT performed today to address associated biomechanical restrictions  and HEP started.   - recommend over-the-counter Tylenol, ice up to 20 minutes at a time, and compression as needed for pain control.  Patient allergic to NSAIDs.  - recommend 1-2 weeks of rest from pickleball, starting with slow progression back if symptoms improve following rest period.   - X-ray images of right knee taken today (AP bilateral standing, PA bilateral standing in flexion, bilateral merchants, and  right lateral views) showed no abnormalities. Images were personally reviewed with patient.    2. OMT 3-4 regions. Oral consent obtained.  Reviewed benefits and potential side effects, including bruising at site of treatment and increased soreness for the next 24-48 hours. Pt. Instructed to increased water intake by 1 L today and tomorrow to help with any residual soreness.   - OMT indicated today due to signs and symptoms as well as local  and remote somatic dysfunction findings and their related neurokinetic, lymphatic, fascial and/or arteriovenous body connections.   - OMT techniques used: Myofascial Release, Muscle Energy, High Velocity Low Amplitude, Still's Technique, and Cranial    - Treatment was tolerated well. Improvement noted in segmental mobility post-treatment in dysfunctional regions. There were no adverse events and no complications immediately following treatment.     3. Pt. Continue the following HEP:  A) Cervicothoracic junction mobilization exercise: 10-15 reps, twice daily. Handout also given.    B) Sidelying reach and roll stretch: Laying on your side with arms extend out, pull back top arm and flex at the elbow, then rotate thoracic spine and open up arm and chest. Repeat for a total of 10 reps on each side, twice daily. Hand-out also given.   C) Scalene, upper trapezius, and levator scapulae stretches : hold neck sidebending stretch for 30 seconds in each plane, bilaterally, twice daily. Hand out given.    D) Seated anterior pelvic tilt exercise: rotate pelvis forward to sit on ischial tuberosities while maintaining neutral shoulder positioning. Repeat frequently throughout the day.   Add  E) progressive eccentric hamstring exercise series.  Handout given.  F)  Bilateral Calf stretching with knee flexed and extended: hold stretch for 30 seconds, repeating 2-3 times on each side. Do stretch twice daily  Then add  G) Eccentric calf stretches:  Lower heels off of a step slowly until reaching end rand plantarflexion, repeating 10 reps twice daily.  Handout given    HOME EXERCISE PROGRAM (HEP):  The patient was taught a homegoing physical therapy regimen as described above. The patient demonstrated understanding of the exercises and proper technique of their execution. This interaction took 15 minutes.     4. Follow-up in 3 weeks for re-evaluation    5. Patient agreeable to today's plan and all questions were answered    This note is  dictated using the M*Modal Fluency Direct word recognition program. There are word recognition mistakes that are occasionally missed on review.    Total time spent face-to face with patient counseling or coordinating care including prognosis, differential diagnosis, risks and benefits of treatment, instructions, compliance risk reductions as well as non-face-to-face time personally spent reviewing medial record, medical documentation, and coordination of care.     EST MINUTES X   [id number] 10-19    [id number] 20-29    [id number] 30-39    [id number] 40-54 x   NEW     00395 15-29    [id number] 30-44    [id number] 45-59    [id number] 60-74    PHONE      5-10    43589 11-20    [id number] 21-30

## 2024-07-10 ENCOUNTER — TELEPHONE (OUTPATIENT)
Dept: PAIN MEDICINE | Facility: CLINIC | Age: 34
End: 2024-07-10
Payer: COMMERCIAL

## 2024-07-10 NOTE — TELEPHONE ENCOUNTER
----- Message from Karishma Maldonado sent at 7/10/2024  3:48 PM CDT -----  Name of Who is Calling:CYNTHIA CEE [[id number]]                   What is the request in detail:PT wants a call back about her procedure on 8/1 she can't do that one and needs to talk with you office about her choices please assist                    Can the clinic reply by [facility]: No                   What Number to Call Back if not in [facility]:[phone]

## 2024-07-17 ENCOUNTER — PATIENT MESSAGE (OUTPATIENT)
Dept: PAIN MEDICINE | Facility: CLINIC | Age: 34
End: 2024-07-17
Payer: COMMERCIAL

## 2024-07-29 ENCOUNTER — OFFICE VISIT (OUTPATIENT)
Dept: SPORTS MEDICINE | Facility: CLINIC | Age: 34
End: 2024-07-29
Payer: COMMERCIAL

## 2024-07-29 VITALS — SYSTOLIC BLOOD PRESSURE: 120 MMHG | DIASTOLIC BLOOD PRESSURE: 82 MMHG

## 2024-07-29 DIAGNOSIS — G44.059 SHORT LASTING UNILATERAL NEURALGIFORM HEADACHE WITH CONJUNCTIVAL INJECTION AND TEARING (SUNCT), NOT INTRACTABLE: ICD-10-CM

## 2024-07-29 DIAGNOSIS — M54.2 CERVICALGIA: ICD-10-CM

## 2024-07-29 DIAGNOSIS — M99.06 SOMATIC DYSFUNCTION OF LOWER EXTREMITY: ICD-10-CM

## 2024-07-29 DIAGNOSIS — M99.04 SACRAL REGION SOMATIC DYSFUNCTION: ICD-10-CM

## 2024-07-29 DIAGNOSIS — M26.609 TMJ (TEMPOROMANDIBULAR JOINT DISORDER): ICD-10-CM

## 2024-07-29 DIAGNOSIS — M99.00 SOMATIC DYSFUNCTION OF HEAD REGION: ICD-10-CM

## 2024-07-29 DIAGNOSIS — S76.311D STRAIN OF RIGHT HAMSTRING, SUBSEQUENT ENCOUNTER: Primary | ICD-10-CM

## 2024-07-29 DIAGNOSIS — M79.10 MYALGIA: ICD-10-CM

## 2024-07-29 DIAGNOSIS — M99.03 SOMATIC DYSFUNCTION OF LUMBAR REGION: ICD-10-CM

## 2024-07-29 DIAGNOSIS — M99.01 CERVICAL (NECK) REGION SOMATIC DYSFUNCTION: ICD-10-CM

## 2024-07-29 DIAGNOSIS — S86.111D GASTROCNEMIUS STRAIN, RIGHT, SUBSEQUENT ENCOUNTER: ICD-10-CM

## 2024-07-29 DIAGNOSIS — M99.02 SOMATIC DYSFUNCTION OF THORACIC REGION: ICD-10-CM

## 2024-07-29 DIAGNOSIS — M99.05 SOMATIC DYSFUNCTION OF PELVIC REGION: ICD-10-CM

## 2024-07-29 PROCEDURE — 1159F MED LIST DOCD IN RCRD: CPT | Mod: CPTII,S$GLB,, | Performed by: NEUROMUSCULOSKELETAL MEDICINE & OMM

## 2024-07-29 PROCEDURE — 99999 PR PBB SHADOW E&M-EST. PATIENT-LVL III: CPT | Mod: PBBFAC,,, | Performed by: NEUROMUSCULOSKELETAL MEDICINE & OMM

## 2024-07-29 PROCEDURE — 3079F DIAST BP 80-89 MM HG: CPT | Mod: CPTII,S$GLB,, | Performed by: NEUROMUSCULOSKELETAL MEDICINE & OMM

## 2024-07-29 PROCEDURE — 99215 OFFICE O/P EST HI 40 MIN: CPT | Mod: 25,S$GLB,, | Performed by: NEUROMUSCULOSKELETAL MEDICINE & OMM

## 2024-07-29 PROCEDURE — 98928 OSTEOPATH MANJ 7-8 REGIONS: CPT | Mod: S$GLB,,, | Performed by: NEUROMUSCULOSKELETAL MEDICINE & OMM

## 2024-07-29 PROCEDURE — 3074F SYST BP LT 130 MM HG: CPT | Mod: CPTII,S$GLB,, | Performed by: NEUROMUSCULOSKELETAL MEDICINE & OMM

## 2024-07-29 NOTE — PROGRESS NOTES
Subjective:     Maricel Jha    Chief Complaint   Patient presents with    Follow-up     HPI    Maricel is a 34 y.o. female with PMHx of AIDA coming in today for follow up for right-sided headache/neck pain. Since last visit the pain has Deteriorated with being out of town for training and sitting a lot. The pain is better with stretching, prior OMT type treatments and worse with lack of sleep, stress. Pt. describes the pain as a 5/10 currently.     Maricel is also coming in today for right distal hamstring pain. Since last visit the pain has Improved.  Pt is compliant with HEP. The pain is better with rest, OMT, HEP and worse with nothing currently. Pt. describes the pain as a 0/10 currently. Pt states the pain has been gone for a week. No longer wearing the brace. Pt states she was able to play pickleball again yesterday without any pain. Denies any increased soreness following pickleball. Does report some residual soreness from the OMT that was performed at the last visit; however, the brusiing has resolved. Pt is not taking any medications for pain right now. There has not been any new a fall/injury/ or traumas since last visit. Overall pt states she is feeling around 85% better compared to prior to the injury.     Office note from 7/9/24 reviewed    Procedures reviewed:   6/11/24- Bilateral OCCIPITAL NERVE BLOCK UNDER U/S  Dr. Yeh  7/2/24- Right OCCIPITAL NERVE BLOCK  Dr. Yeh    PAST MEDICAL HISTORY: History reviewed. No pertinent past medical history.  PAST SURGICAL HISTORY: History reviewed. No pertinent surgical history.  FAMILY HISTORY: No family history on file.    SOCIAL HISTORY:   Social History     Socioeconomic History    Marital status:    Tobacco Use    Smoking status: Never    Smokeless tobacco: Never     Social Determinants of Health     Financial Resource Strain: Low Risk  (7/11/2024)    Received from [facility]    Overall Financial Resource Strain (CARDIA)     Difficulty of  Paying Living Expenses: Not hard at all   Food Insecurity: No Food Insecurity (7/11/2024)    Received from [facility]    Hunger Vital Sign     Worried About Running Out of Food in the Last Year: Never true     Ran Out of Food in the Last Year: Never true   Transportation Needs: No Transportation Needs (7/11/2024)    Received from [facility]    PRAPARE - Transportation     Lack of Transportation (Medical): No     Lack of Transportation (Non-Medical): No   Physical Activity: Sufficiently Active (7/11/2024)    Received from [facility]    Exercise Vital Sign     Days of Exercise per Week: 3 days     Minutes of Exercise per Session: 120 min   Recent Concern: Physical Activity - Insufficiently Active (4/16/2024)    Received from [facility]    Exercise Vital Sign     Days of Exercise per Week: 2 days     Minutes of Exercise per Session: 60 min   Stress: No Stress Concern Present (7/11/2024)    Received from [facility] [employer]    [employer] - Occupational Stress Questionnaire     Feeling of Stress : Not at all   Housing Stability: Unknown (7/6/2024)    Housing Stability Vital Sign     Unable to Pay for Housing in the Last Year: No     MEDICATIONS:   Current Outpatient Medications:     AJOVY AUTOINJECTOR 225 mg/1.5 mL autoinjector, Inject into the skin., Disp: , Rfl:     carBAMazepine 100 mg/5 mL (5 mL) Susp, , Disp: , Rfl:     cetirizine (ZYRTEC) 10 MG tablet, , Disp: , Rfl:     cholecalciferol, vitamin D3, 125 mcg (5,000 unit) capsule, , Disp: , Rfl:     lamoTRIgine 100 mg TbDL, , Disp: , Rfl:     magnesium 200 mg Tab, , Disp: , Rfl:     montelukast (SINGULAIR) 10 mg tablet, , Disp: , Rfl:     orphenadrine/aspirin/caffeine (NORGESIC FORTE ORAL), , Disp: , Rfl:     progesterone (PROMETRIUM) 100 MG capsule, Take 100 mg by mouth once daily., Disp: , Rfl:     rizatriptan (MAXALT) 10 MG tablet, Take by mouth., Disp: , Rfl:     ubrogepant (UBRELVY ORAL), Take by mouth., Disp: , Rfl:     vitamin B comp  and C no.3 (B COMPLEX PLUS VITAMIN C) 15-10-50-5-300 mg Cap, , Disp: , Rfl:     ALLERGIES:   Review of patient's allergies indicates:   Allergen Reactions    Nsaids (non-steroidal anti-inflammatory drug) Rash and Shortness Of Breath    Sumatriptan Other (See Comments)     Severe neck and back pain    Ibuprofen Other (See Comments)     Objective:     VITAL SIGNS: /82    General    Vitals reviewed.  Constitutional: She is oriented to person, place, and time. She appears well-developed and well-nourished.   Neurological: She is alert and oriented to person, place, and time.   Psychiatric: She has a normal mood and affect. Her behavior is normal.           MUSCULOSKELETAL EXAM:  Cervical Spine: right cervical region     Observation:    Posture:  Upright  No obvious shoulder un-leveling while standing.    No edema, erythema, or ecchymosis noted in cervical and thoraic spine regions.    No midline skin abnormalities.    No atrophy of upper limb musculature.  Gait: Non-antalgic with Neutral ankle mechanics and Pes planus. Gait without trendelenberg, heel walking, toe walking, and tandem walking.     Tenderness:  No tenderness throughout the cervical spine upon spinous process palpation  + tenderness over the base of the right occiput   No bony deformities or step-offs palpated.   No tenderness over the posterior paraspinals in cervical spine on right  + bilateral upper trapezius tenderness      Range of Motion (* = with pain):  CERVICAL SPINE  Full AROM in flexion, extension, sidebending, and rotation.     SHOULDER  Active flexion to 180° on left and 180° on right.  Active abduction to 180° on left and 180° on right.  Active internal rotation to T7 on left and T7 on right.    Active external rotation to T4 on left and T7 on right.    No scapular dyskinesia or winging.     Strength Testing (* = with pain):  Sternocleidomastoid - 5/5 on left and 5/5 on right  Upper trapezius-  5/5 on left and 5/5 on right  Deltoid -  5/5 on left and 5/5 on right  Biceps - 5/5 on left and 5/5 on right  Triceps - 5/5 on left and 5/5 on right  Wrist extension - 5/5 on left and 5/5 on right  Wrist flexion - 5/5 on left and 5/5 on right   - 5/5 on left and 5/5 on right  Finger extension - 5/5 on left and 5/5 on right  Finger abduction - 5/5 on left and 5/5 on right    right KNEE EXAMINATION   Affected side is compared to contralateral knee     Observation:  No edema, erythema, ecchymosis, or effusion noted.  No muscle atrophy of the thighs and calves noted.  No obvious bony deformities noted.   No Genu valgus/varum noted.  No recurvatum noted.    No tibial internal/external torsion.    Posture:  Upright  Gait: Right antalgic with Neutral ankle mechanics and Neutral medial arch    Tenderness:  Patella - none    Lateral joint line - none  Quad tendon - none   Medial joint line - none  Patellar tendon - none  Medial plica - none  Tibial tubercle - none   Lateral plica - none  Pes anserine - none   MCL prox - none  Distal ITB - none   MCL distal - none  MFC - none    LCL prox - none  LFC - none    LCL distal - none  Tibia - none    Fibula - none    + mild medial distal hamstring tenderness, improved from last visit  + mild media gastrocnemius tenderness, improved from last visit    No obvious bursae, plicae, popliteal cysts, or tendon derangement palpated.          ROM (* = with pain):   Active extension to 0° on left without hyperextension, lag, crepitus, or patellar J sign.   Active extension to 0° on right without hyperextension, lag, crepitus, or patellar J sign.  Active flexion to 135° on left and 135° on right    Strength(* = with pain):  Knee Flexion - 5/5 on left and 5/5 on right* (improved following OMT)   Knee Extension - 5/5 on left and 5/5 on right  Hip Flexion - 5/5 on left and 5/5 on right  Hip Extension - 5/5 on left and 5/5 on right  Ankle dorsiflexion - 5/5 on left and 5/5 on right  Ankle Plantarflexion - 5/5 on left and 5/5 on  right*, less so with knee flexed (improved following OMT)     Patellofemoral Exam:   Patellar ballottement - negative  Bulge sign - negative  Patellar grind - negative    No patellar laxity with medial and lateral translation   No apprehension with medial and lateral patellar translation.     Meniscus Testing:     No pain with terminal extension and flexion at joint lines.  Lidias test - negative   Thesaly test - negative  Bounce home test - negative    Ligament Testing:  Lachman's test - negative  No laxity with anterior drawer.  No laxity with posterior drawer.    No posterior sag sign.   No laxity with varus testing at 0 and 30 degrees.  No laxity with valgus testing at 0 and 30 degrees.    TART (Tissue texture abnormality, Asymmetry,  Restriction of motion and/or Tenderness) changes:     Head:   Cranial Rhythmic Impulse (CRI): restricted with Decreased amplitude with jaw occlusion  + sphenopalatine ganglion TTA on left  Strain Patterns: absent  Occipitoatlantal (OA) Joint: TTA on right  Suture/Bone Restriction Side   Occipitomastoid (OM)  Absent     Parietal mastoid (PM) Absent     Petrojugular (PJ) Present L   Sphenosquamous pivot (SSP) Present L   Zygomaticotemporal (ZT) Absent     Zygomaticofrontal (ZF) Absent     Frontonasal Absent     [place] bone Absent     Parietal bone Absent     Frontal bone Absent        Muscle Tissue Texture Abnormality Side   Lateral pterygoid Present L   Medial pterygoid Present L        Cervical Spine   C1 TTA RIGHT   C2 TTA RIGHT   C3 neutral   C4 Neutral   C5 Neutral   C6 Neutral   C7 ERS LEFT        Thoracic Spine   T1 FRS LEFT   T2 Neutral   T3 Neutral   T4 Neutral   T5 Neutral   T6 Neutral   T7 Neutral   T8 Neutral   T9 Neutral   T10 Neutral   T11 Neutral   T12 Neutral      Lumbar Spine   L1 Neutral   L2 Neutral   L3 Neutral   L4 FRS RIGHT   L5 FRS RIGHT     Pelvis:  Innominate:Right superior shear  Pubic bone:Right superior pubic shear    Sacrum:Left on Right sacral  torsion    Lower extremity:  Right distal medial hamstring and right medial gastrocnemius Herniated trigger point (HTP) fascial distortions, R anterior talus    Key   F= Flexed   E = Extended   R = Rotated   S = Sidebent   TTA = tissue texture abnormality     Neurovascular Examination:   Sensation intact to light touch in the obturator, lateral/intermediate/medial/posterior femoral cutaneous, saphenous, and common peroneal nerves bilaterally.  Motor Function:    Fully intact motor function at hip, knee, foot and ankle.  DTRs: 2+/4 reflexes at L4 and S1 dermatomes.   Negative seated straight leg raise bilaterally.    Pulses intact at the DP and PT arteries bilaterally.    Capillary refill intact <2 seconds in all toes bilaterally.    Assessment:      Encounter Diagnoses   Name Primary?    Strain of right hamstring, subsequent encounter Yes    Gastrocnemius strain, right, subsequent encounter     Short lasting unilateral neuralgiform headache with conjunctival injection and tearing (SUNCT), not intractable     Cervicalgia     TMJ (temporomandibular joint disorder)     Myalgia     Somatic dysfunction of head region     Somatic dysfunction of thoracic region     Cervical (neck) region somatic dysfunction     Somatic dysfunction of lumbar region     Sacral region somatic dysfunction     Somatic dysfunction of pelvic region     Somatic dysfunction of lower extremity         Plan:   1. Neck, right sided upper back myofascial pain, myalgia with underlying AIDA headaches as well as biomechanical restrictions of the upper kinetic chain and poor posture. Underlying component of TMJ disorder suspected as well.   - OMT performed again today to help with symptom relief.   - Continue proper posture and work desk and desk accommodations to facilitate good posture.  - Continue follow up with Dr. Yeh in pain management, with discussion of possible sphenopalatine ganglion block, however this is unfortunately not covered by her  insurance  - Contact info for [facility] given for further TMJ eval, scheduled tomorrow for this  - X-ray images of cervical spine taken 5/2/24 (AP, lateral, bilateral obliques, and odontoid  flexion and extension views) showed No acute osseous abnormality seen. Straightening of the normal cervical lordosis which may be positional or related to muscle spasm.     2. Acute right knee pain from pickleball injury likely secondary to medial hamstring and medial gastrocnemius muscle strains with full strength on exam and no associated significant ecchymosis- improved.   - OMT performed again today to address associated biomechanical restrictions and HEP continued.   - Recommend over-the-counter Tylenol, ice up to 20 minutes at a time, and compression as needed for pain control.  Patient allergic to NSAIDs.  - Pt. Cleared to resume pickleball at this time. Advised patient to slowly increase activity as tolerated and to discontinue activities if pain significantly worsens.   - X-ray images of right knee taken 7/9/24 (AP bilateral standing, PA bilateral standing in flexion, bilateral merchants, and  right lateral views) showed no abnormalities. Images were personally reviewed with patient.    3. OMT 7-8 regions. Oral consent obtained.  Reviewed benefits and potential side effects, including bruising at site of treatment and increased soreness for the next 24-48 hours. Pt. Instructed to increased water intake by 1 L today and tomorrow to help with any residual soreness.   - OMT indicated today due to signs and symptoms as well as local and remote somatic dysfunction findings and their related neurokinetic, lymphatic, fascial and/or arteriovenous body connections.   - OMT techniques used: Myofascial Release, Muscle Energy, Still's Technique, Cranial , Fascial Distortion Model, and Articulatory   - Treatment was tolerated well. Improvement noted in segmental mobility post-treatment in dysfunctional regions. There  were no adverse events and no complications immediately following treatment.     4. Pt. to continue the following HEP:  A) Cervicothoracic junction mobilization exercise: 10-15 reps, twice daily. Handout also given.    B) Sidelying reach and roll stretch: Laying on your side with arms extend out, pull back top arm and flex at the elbow, then rotate thoracic spine and open up arm and chest. Repeat for a total of 10 reps on each side, twice daily. Hand-out also given.   C) Scalene, upper trapezius, and levator scapulae stretches : hold neck sidebending stretch for 30 seconds in each plane, bilaterally, twice daily. Hand out given.    D) Seated anterior pelvic tilt exercise: rotate pelvis forward to sit on ischial tuberosities while maintaining neutral shoulder positioning. Repeat frequently throughout the day.   E) progressive eccentric hamstring exercise series.  Handout given.  F)  Bilateral Calf stretching with knee flexed and extended: hold stretch for 30 seconds, repeating 2-3 times on each side. Do stretch twice daily  G) Eccentric calf stretches:  Lower heels off of a step slowly until reaching end rand plantarflexion, repeating 10 reps twice daily.  Handout given    The patient was taught a homegoing physical therapy regimen as described above. The patient demonstrated understanding of the exercises and proper technique of their execution.     5. Follow-up as needed re-evaluation    6. Patient agreeable to today's plan and all questions were answered    This note is dictated using the M*Modal Fluency Direct word recognition program. There are word recognition mistakes that are occasionally missed on review.    Total time spent face-to face with patient counseling or coordinating care including prognosis, differential diagnosis, risks and benefits of treatment, instructions, compliance risk reductions as well as non-face-to-face time personally spent reviewing medial record, medical documentation, and  coordination of care.     EST MINUTES X   [id number] 10-19    [id number] 20-29    [id number] 30-39    [id number] 40-54 x   NEW     [id number] 15-29    [id number] 30-44    [id number] 45-59    [id number] 60-74    PHONE      5-10    [id number] 11-20    [id number] 21-30

## 2024-07-31 ENCOUNTER — PATIENT MESSAGE (OUTPATIENT)
Dept: SPORTS MEDICINE | Facility: CLINIC | Age: 34
End: 2024-07-31
Payer: COMMERCIAL

## 2024-08-06 ENCOUNTER — TELEPHONE (OUTPATIENT)
Dept: SPINE | Facility: CLINIC | Age: 34
End: 2024-08-06
Payer: COMMERCIAL

## 2024-08-08 ENCOUNTER — OFFICE VISIT (OUTPATIENT)
Dept: SPINE | Facility: CLINIC | Age: 34
End: 2024-08-08
Payer: COMMERCIAL

## 2024-08-08 ENCOUNTER — TELEPHONE (OUTPATIENT)
Dept: PAIN MEDICINE | Facility: CLINIC | Age: 34
End: 2024-08-08
Payer: COMMERCIAL

## 2024-08-08 VITALS
HEIGHT: 66 IN | DIASTOLIC BLOOD PRESSURE: 64 MMHG | BODY MASS INDEX: 32.81 KG/M2 | HEART RATE: 62 BPM | WEIGHT: 204.13 LBS | SYSTOLIC BLOOD PRESSURE: 128 MMHG | RESPIRATION RATE: 18 BRPM

## 2024-08-08 DIAGNOSIS — M54.81 OCCIPITAL NEURALGIA OF RIGHT SIDE: Primary | ICD-10-CM

## 2024-08-08 DIAGNOSIS — M54.81 OCCIPITAL NEURALGIA, UNSPECIFIED LATERALITY: Primary | ICD-10-CM

## 2024-08-08 DIAGNOSIS — M54.2 CERVICALGIA: ICD-10-CM

## 2024-08-08 DIAGNOSIS — G44.059 SHORT LASTING UNILATERAL NEURALGIFORM HEADACHE WITH CONJUNCTIVAL INJECTION AND TEARING (SUNCT), NOT INTRACTABLE: ICD-10-CM

## 2024-08-08 PROCEDURE — 99214 OFFICE O/P EST MOD 30 MIN: CPT | Mod: S$GLB,,, | Performed by: ANESTHESIOLOGY

## 2024-08-08 PROCEDURE — 99999 PR PBB SHADOW E&M-EST. PATIENT-LVL IV: CPT | Mod: PBBFAC,,, | Performed by: ANESTHESIOLOGY

## 2024-08-08 PROCEDURE — 1159F MED LIST DOCD IN RCRD: CPT | Mod: CPTII,S$GLB,, | Performed by: ANESTHESIOLOGY

## 2024-08-08 PROCEDURE — 3008F BODY MASS INDEX DOCD: CPT | Mod: CPTII,S$GLB,, | Performed by: ANESTHESIOLOGY

## 2024-08-08 PROCEDURE — 3078F DIAST BP <80 MM HG: CPT | Mod: CPTII,S$GLB,, | Performed by: ANESTHESIOLOGY

## 2024-08-08 PROCEDURE — 1160F RVW MEDS BY RX/DR IN RCRD: CPT | Mod: CPTII,S$GLB,, | Performed by: ANESTHESIOLOGY

## 2024-08-08 PROCEDURE — 3074F SYST BP LT 130 MM HG: CPT | Mod: CPTII,S$GLB,, | Performed by: ANESTHESIOLOGY

## 2024-08-08 NOTE — PROGRESS NOTES
Established Visit    PCP: Jackie Valenzuela MD    REFERRING PHYSICIAN: No ref. provider found    CHIEF COMPLAINT: Headaches    Original HISTORY OF PRESENT ILLNESS: Maricel Jha presents to the clinic for the evaluation of the above pain. The pain started 3 years ago.     Original Pain Description:  The patient is a 34 year old female with pmh of AIDA who presents for evaluation and treatment of headaches. The headaches started 3 years ago with no inciting factor. The pain is right sided and is located in the upper neck/base of the skull and spread towards the right side of the face. Pain in the back is described as a soreness, pain in the front is more of a stabbing and searing pain.  Often she will get episodes of tearing and facial flushing. Exacerbating factors: Stress, wind, brushing teeth, brushing hair. Mitigating factors: medication. Symptoms interfere with work and quality of life. The patient feels like symptoms have been unchanged. No photophobia, nausea, vomiting, auras.     She recently moved from [place] to [place]. She see's a neurologist in [place] who researches AIDA and is happy with her care there. Her neurologist over there manages her medications. She gets cervical trigger point injections and greater occipital nerve blocks about every 6-8 weeks. These give her about 75% relief for 4 weeks. Steroids are added about every other injection. She has had 6 injections so far, last one was earlier this week. Overall, she is happy with her treatment and wants to continue as is for now.     Original PAIN SCORES:  Best: Pain is 2  Worst: Pain is 9  Current: Pain is 4        8/8/2024    10:47 AM   Last 3 PDI Scores   Pain Disability Index (PDI) 37     INTERVAL HISTORY: (Newest visit at the bottom)   Interval History (7/1/2024):   34 year old female returns in follow up for occipital neuralgia. Patient had bilateral occipital nerve blocks on  6/17/2024. Patient reports no benefit. She localizes pain to the right occipital prominence. Typically got good benefit with landmark guided procedure. No benefit with US guided procedure. Patient still follows with her [facility]rologist via virtual visits.  Patient denies recent falls, trauma, hospitalizations, or infections.     Interval History 8/8/2024:   Patient following up for occipital neuralgia. Patient said occipital nerve block every 6 weeks usually helps and would like to be scheduled to have the procedure done. Her scheduled right sphenopalatine block procedure was cancelled due to high co pays. Patient said her headaches and neck pain is not controlled with her current medication regimen. Her neurologist added on memantine to her current medication. Her pain today is 2/10. Denied fevers, vomiting, visual changes or difficulties with balance or ambulation.     6 weeks of Conservative therapy:  PT: n/a (Must include dates)  HEP: n/a    Treatments / Medications: (Ice/Heat/NSAIDS/APAP/etc):  Carbamazepine  Ajovy  Maxalt  Ubrelvy  Lamotrigine  Memantin     Interventional Pain Procedures:   Right Occipital nerve blocks       No past medical history on file.  History reviewed. No pertinent surgical history.  Social History     Socioeconomic History    Marital status:    Tobacco Use    Smoking status: Never    Smokeless tobacco: Never     Social Determinants of Health     Financial Resource Strain: Low Risk  (7/11/2024)    [place] [facility]    Overall Financial Resource Strain (CARDIA)     Difficulty of Paying Living Expenses: Not hard at all   Food Insecurity: No Food Insecurity (7/11/2024)    [place] [facility]    Hunger Vital Sign     Worried About Running Out of Food in the Last Year: Never true     Ran Out of Food in the Last Year: Never true   Transportation Needs: No Transportation Needs (7/11/2024)    [place] [facility]    PRAPARE - Transportation     Lack of  Transportation (Medical): No     Lack of Transportation (Non-Medical): No   Physical Activity: Sufficiently Active (7/11/2024)    [place] [facility] [facility]    Exercise Vital Sign     Days of Exercise per Week: 3 days     Minutes of Exercise per Session: 120 min   Recent Concern: Physical Activity - Insufficiently Active (4/16/2024)    [place] [facility]    Exercise Vital Sign     Days of Exercise per Week: 2 days     Minutes of Exercise per Session: 60 min   Stress: No Stress Concern Present (7/11/2024)    [place] [facility]    [employer] - Occupational Stress Questionnaire     Feeling of Stress : Not at all   Housing Stability: Unknown (7/6/2024)    Housing Stability Vital Sign     Unable to Pay for Housing in the Last Year: No     No family history on file.    Review of patient's allergies indicates:   Allergen Reactions    Nsaids (non-steroidal anti-inflammatory drug) Rash and Shortness Of Breath    Sumatriptan Other (See Comments)     Severe neck and back pain    Ibuprofen Other (See Comments)       Current Outpatient Medications   Medication Sig    AJOVY AUTOINJECTOR 225 mg/1.5 mL autoinjector Inject into the skin.    carBAMazepine 100 mg/5 mL (5 mL) Susp     cetirizine (ZYRTEC) 10 MG tablet     cholecalciferol, vitamin D3, 125 mcg (5,000 unit) capsule     lamoTRIgine 100 mg TbDL     magnesium 200 mg Tab     montelukast (SINGULAIR) 10 mg tablet     orphenadrine/aspirin/caffeine (NORGESIC FORTE ORAL)     progesterone (PROMETRIUM) 100 MG capsule Take 100 mg by mouth once daily.    rizatriptan (MAXALT) 10 MG tablet Take by mouth.    ubrogepant (UBRELVY ORAL) Take by mouth.    vitamin B comp and C no.3 (B COMPLEX PLUS VITAMIN C) 15-10-50-5-300 mg Cap      No current facility-administered medications for this visit.     ROS:  GENERAL: No fever. No chills. No fatigue.   HEENT: + tearing  CV: Denies chest pain.   PULM: Denies of shortness of breath.  GI: Denies constipation.  "No diarrhea. No abdominal pain. Denies nausea. Denies vomiting. No blood in stool.  HEME: Denies bleeding problems.  : Denies urgency. No painful urination. No blood in urine.  MS: + neck pain  SKIN: Denies rash.   NEURO: + headaches, no weakness  PSYCH:  Denies difficulty sleeping. No anxiety. Denies depression. No suicidal thoughts.     VITALS:   Vitals:    08/08/24 1043   BP: 128/64   Pulse: 62   Resp: 18   Weight: 92.6 kg (204 lb 2.3 oz)   Height: 5' 6" (1.676 m)   PainSc:   5   PainLoc: Head     PHYSICAL EXAM:   GENERAL: Well appearing, in no acute distress, alert and oriented x3.  PSYCH:  Mood and affect appropriate.  SKIN: Skin color, texture, turgor normal, no rashes or lesions.  HEENT:  Normocephalic, atraumatic. Cranial nerves grossly intact. Mild tenderness to palpation over the cervical paraspinal muscles and occipital ridge bilaterally.   Full ROM with flexion, extension, lateral rotation. Lhermitte's and Spurling's are negative  PULM: No evidence of respiratory difficulty, symmetric chest rise.  GI:  Non-distended  EXTREMITIES: No deformities, edema, or skin discoloration.   NEURO: CN II-XII are intact. Sensation is equal and appropriate bilaterally. Bilateral upper and lower extremity strength is normal and symmetric. Bilateral upper and lower extremity coordination and muscle stretch reflexes are physiologic and symmetric. Plantar response are downgoing. Negative Hoffmans.No clonus  GAIT: normal.    IMAGING:    EXAM: Brain w/wo contrast MRI     DATE: 5/29/2021 11:00     INDICATION:  - concern for brainstem MS/infalmmatory/demyelinating condition, patient with trigeminal pain/redness x4 weeks. please thin cuts brain stem.     COMPARISON: CTA 5/29/2021     TECHNIQUE: Multiplanar multisequence images of the brain were obtained before and after the intravenous administration of contrast material.     DISCUSSION:     No acute parenchymal abnormality.   No hydrocephalus, midline shift, mass effect, " restricted diffusion or acute hemorrhage.   Pineal region, pituitary gland, cranio cervical junction, orbits and internal auditory canals are unremarkable.   Major intracranial flow-voids are well-maintained.   No mass or abnormal enhancement. The cisternal segments of the trigeminal nerves are unremarkable with no abnormal enhancement.   No osseous lesions.     IMPRESSION:     Unremarkable MRI of the brain with and without contrast.     ASSESSMENT: 34 y.o. year old female with pain, consistent with:    Encounter Diagnoses   Name Primary?    Occipital neuralgia of right side Yes    Cervicalgia     short-lasting unilateral neuralgiform headache attacks with cranial autonomic symptoms (AIDA), not intractable      DISCUSSION: Maricel Jha is a 34 year old who comes to us with AIDA and occipital neuralgia. She has received significant relief with her medications and cervical TPI and occipital nerve blocks and is happy with her treatment as is currently.     PLAN:  Schedule for in office landmark guided right occipital nerve block. Patient requesting no US, only landmark.  Schedule for in office right sphenopalatine nerve block with ultrasound.   XR Cervical spine reviewed.  Recommend that she continue to follow up with her neurologist in [place]  Continue current medication regimen as directed by neurologist.     Wei Trinh MD   International Pain Medicine Fellow   [facility]     I spent a total of 30 minutes on the day of the visit.  This includes face to face time and non-face to face time preparing to see the patient by reviewing previous labs/imaging, obtaining and/or reviewing separately obtained history, documenting clinical information in the electronic or other health record, independently interpreting results and communicating results to the patient/family/caregiver.    Osman Yeh

## 2024-09-03 ENCOUNTER — PROCEDURE VISIT (OUTPATIENT)
Dept: PAIN MEDICINE | Facility: CLINIC | Age: 34
End: 2024-09-03
Payer: COMMERCIAL

## 2024-09-03 VITALS
WEIGHT: 202.81 LBS | DIASTOLIC BLOOD PRESSURE: 71 MMHG | RESPIRATION RATE: 18 BRPM | TEMPERATURE: 97 F | OXYGEN SATURATION: 100 % | SYSTOLIC BLOOD PRESSURE: 131 MMHG | BODY MASS INDEX: 32.59 KG/M2 | HEART RATE: 79 BPM | HEIGHT: 66 IN

## 2024-09-03 DIAGNOSIS — G89.4 CHRONIC PAIN DISORDER: ICD-10-CM

## 2024-09-03 DIAGNOSIS — M54.81 OCCIPITAL NEURALGIA OF RIGHT SIDE: Primary | ICD-10-CM

## 2024-09-03 NOTE — PROCEDURES
Patient Name: Maricel Jha  MRN: [id number]    INFORMED CONSENT: The procedure, risks, benefits and options were discussed with patient. There are no contraindications to the procedure. The patient expressed understanding and agreed to proceed. The personnel performing the procedure was discussed. I verify that I personally obtained Maricel's consent prior to the start of the procedure and the signed consent can be found on the patient's chart.    Procedure Date: 09/03/2024    Anesthesia: Topical    Pre Procedure diagnosis:   1. Occipital neuralgia of right side        Post-Procedure diagnosis: same      Sedation: None    PROCEDURE: Right Greater and lesser OCCIPITAL NERVE BLOCK via anatomic guidance   The patient was placed in prone position. The site of pain and procedure were confirmed with the patient prior to starting the procedure. The patient's nuchal ridge of the occipital bone was identified and prepped with chlorhexidine. After performing time out A 27g gauge 1.5 inch was advanced through the skin and subcutaneous tissues lateral to C2 Between the Inferior oblique muscle and semispinalis capitis.  Aspiration for blood and CSF was negative.  A total of 5 ml of Bupivacaine 0.50% was injected.  There were no signs or symptoms of intravascular injection. No complications were evident. No specimens collected.      Blood Loss: Nill  Specimen: None    Bianca Aguayo MD    I reviewed and edited the resident/fellow's note. I conducted my own interview and physical examination. I agree with the findings and documentation. I was present and supervising all critical portions of the procedure.      Osman Yeh MD

## 2024-09-12 ENCOUNTER — PATIENT MESSAGE (OUTPATIENT)
Dept: PAIN MEDICINE | Facility: CLINIC | Age: 34
End: 2024-09-12
Payer: COMMERCIAL

## 2024-09-13 NOTE — PROGRESS NOTES
Subjective:     Maricel Jha    Chief Complaint   Patient presents with    Neck - Pain     HPI    Maricel is a 34 y.o. female with PMHx of AIDA coming in today for follow up for right-sided headache/neck pain. Since last visit the pain has Deteriorated in her right jaw, following her intake visit with oral maxillary facial surgeon, Dr. Jessy Ochoa DDS, recommended starting physical therapy for 6 weeks followed by a bite splint. Pt reports increased pain in her right jaw. She is not compliant with HEP. The pain is better with stretching, prior OMT type treatments and worse with lack of sleep, stress. Pt. describes the pain as a 7/10 currently.  Patient planning on holding off on any further nerve blocks with Dr. Yeh, as she just started a new headache medication with her neurologist and she wants to see if this works 1st.    Office note from 7/9/24 reviewed    Procedures reviewed:   6/11/24- Bilateral OCCIPITAL NERVE BLOCK UNDER U/S  Dr. Yeh  7/2/24- Right OCCIPITAL NERVE BLOCK  Dr. Yeh  9/3/24- Right Greater and lesser OCCIPITAL NERVE BLOCK via anatomic guidance Dr. Yeh    PAST MEDICAL HISTORY: History reviewed. No pertinent past medical history.  PAST SURGICAL HISTORY: History reviewed. No pertinent surgical history.  FAMILY HISTORY: No family history on file.    SOCIAL HISTORY:   Social History     Socioeconomic History    Marital status:    Tobacco Use    Smoking status: Never    Smokeless tobacco: Never     Social Determinants of Health     Financial Resource Strain: Low Risk  (7/11/2024)    Received from [facility]    Overall Financial Resource Strain (CARDIA)     Difficulty of Paying Living Expenses: Not hard at all   Food Insecurity: No Food Insecurity (7/11/2024)    Received from [facility]    Hunger Vital Sign     Worried About Running Out of Food in the Last Year: Never true     Ran Out of Food in the Last Year: Never true   Transportation Needs: No Transportation Needs  (7/11/2024)    [place] from [facility]    PRAPARE - Transportation     Lack of Transportation (Medical): No     Lack of Transportation (Non-Medical): No   Physical Activity: Sufficiently Active (7/11/2024)    Received from [facility]    Exercise Vital Sign     Days of Exercise per Week: 3 days     Minutes of Exercise per Session: 120 min   Recent Concern: Physical Activity - Insufficiently Active (4/16/2024)    Received from [facility]    Exercise Vital Sign     Days of Exercise per Week: 2 days     Minutes of Exercise per Session: 60 min   Stress: No Stress Concern Present (7/11/2024)    Received from [facility] [facility]    [employer] - Occupational Stress Questionnaire     Feeling of Stress : Not at all   Housing Stability: Unknown (7/6/2024)    Housing Stability Vital Sign     Unable to Pay for Housing in the Last Year: No     MEDICATIONS:   Current Outpatient Medications:     AJOVY AUTOINJECTOR 225 mg/1.5 mL autoinjector, Inject into the skin., Disp: , Rfl:     carBAMazepine 100 mg/5 mL (5 mL) Susp, , Disp: , Rfl:     cetirizine (ZYRTEC) 10 MG tablet, , Disp: , Rfl:     cholecalciferol, vitamin D3, 125 mcg (5,000 unit) capsule, , Disp: , Rfl:     lamoTRIgine 100 mg TbDL, , Disp: , Rfl:     magnesium 200 mg Tab, , Disp: , Rfl:     memantine (NAMENDA) 10 MG Tab, Take by mouth., Disp: , Rfl:     montelukast (SINGULAIR) 10 mg tablet, , Disp: , Rfl:     orphenadrine/aspirin/caffeine (NORGESIC FORTE ORAL), , Disp: , Rfl:     progesterone (PROMETRIUM) 100 MG capsule, Take 100 mg by mouth once daily., Disp: , Rfl:     rizatriptan (MAXALT) 10 MG tablet, Take by mouth., Disp: , Rfl:     ubrogepant (UBRELVY ORAL), Take by mouth., Disp: , Rfl:     vitamin B comp and C no.3 (B COMPLEX PLUS VITAMIN C) 15-10-50-5-300 mg Cap, , Disp: , Rfl:     ALLERGIES:   Review of patient's allergies indicates:   Allergen Reactions    Nsaids (non-steroidal anti-inflammatory drug) Rash and Shortness Of Breath     Sumatriptan Other (See Comments)     Severe neck and back pain    Ibuprofen Other (See Comments)     Objective:     VITAL SIGNS: /72   Pulse 71    General    Vitals reviewed.  Constitutional: She is oriented to person, place, and time. She appears well-developed and well-nourished.   Neurological: She is alert and oriented to person, place, and time.   Psychiatric: She has a normal mood and affect. Her behavior is normal.         MUSCULOSKELETAL EXAM:  Cervical Spine: right cervical region     Observation:    Posture:  Upright  No obvious shoulder un-leveling while standing.    No edema, erythema, or ecchymosis noted in cervical and thoraic spine regions.    No midline skin abnormalities.    No atrophy of upper limb musculature.  Gait: Non-antalgic with Neutral ankle mechanics and Pes planus. Gait without trendelenberg, heel walking, toe walking, and tandem walking.     Tenderness:  No tenderness throughout the cervical spine upon spinous process palpation  No tenderness over the base of the occiput   No bony deformities or step-offs palpated.   No tenderness over the posterior paraspinals in cervical spine on right  No upper trapezius tenderness   + R TMJ tenderness     Range of Motion (* = with pain):  CERVICAL SPINE  Full AROM in flexion, extension, sidebending, and rotation.     SHOULDER  Active flexion to 180° on left and 180° on right.  Active abduction to 180° on left and 180° on right.  Active internal rotation to T7 on left and T7 on right.    Active external rotation to T4 on left and T7 on right.    No scapular dyskinesia or winging.     Strength Testing (* = with pain):  Sternocleidomastoid - 5/5 on left and 5/5 on right  Upper trapezius-  5/5 on left and 5/5 on right  Deltoid - 5/5 on left and 5/5 on right  Biceps - 5/5 on left and 5/5 on right  Triceps - 5/5 on left and 5/5 on right  Wrist extension - 5/5 on left and 5/5 on right  Wrist flexion - 5/5 on left and 5/5 on right   - 5/5 on left  and 5/5 on right  Finger extension - 5/5 on left and 5/5 on right  Finger abduction - 5/5 on left and 5/5 on right     Structural Exam:  TART (Tissue texture abnormality, Asymmetry,  Restriction of motion and/or Tenderness) changes:     Head:   Cranial Rhythmic Impulse (CRI): restricted with Decreased amplitude  + sphenopalatine ganglion TTA on right  Strain Patterns: absent  Occipitoatlantal (OA) Joint: TTA on right  Suture/Bone Restriction Side   Occipitomastoid (OM)  Absent     Parietal mastoid (PM) Absent     Petrojugular (PJ) Absent     Sphenosquamous pivot (SSP) Present R   Zygomaticotemporal (ZT) Absent     Zygomaticofrontal (ZF) Absent     Frontonasal Absent     [place] bone Absent     Parietal bone Absent     Frontal bone Absent        Muscle Tissue Texture Abnormality Side   Lateral pterygoid Present R>L   Medial pterygoid Present R>L        Cervical Spine   C1 TTA RIGHT   C2 TTA RIGHT   C3 Neutral   C4 Neutral   C5 Neutral   C6 Neutral   C7 Neutral        Thoracic Spine   T1 Neutral   T2 Neutral   T3 Neutral   T4 Neutral   T5 NS-left,R-right   T6 NS-left,R-right   T7 NS-left,R-right   T8 Neutral   T9 Neutral   T10 Neutral   T11 Neutral   T12 Neutral     Rib cage: R6 external torsion on right    Upper extremity: neutral     Key   F= Flexed   E = Extended   R = Rotated   S = Sidebent   TTA = tissue texture abnormality      Neurovascular Exam:  Spurling's - negative on left and negative on right  Lhermitte's Sign - absent  Seated straight leg raise - negative on left and negative on right  Reflexes +2/4 and symmetric at the biceps, brachioradialis, and triceps bilaterally   Sensation intact to light touch in the C5-T1 dermatomes bilaterally.   Emiliana's sign- absent  Intact and symmetric radial pulses bilaterally.    Assessment:      Encounter Diagnoses   Name Primary?    Short lasting unilateral neuralgiform headache with conjunctival injection and tearing (SUNCT), not intractable Yes    Cervicalgia      TMJ (temporomandibular joint disorder)     Myalgia     Somatic dysfunction of head region     Cervical (neck) region somatic dysfunction     Somatic dysfunction of thoracic region     Somatic dysfunction of rib cage region         Plan:   1. Neck, right sided upper back myofascial pain, myalgia with underlying AIDA headaches as well as biomechanical restrictions of the upper kinetic chain and poor posture. Underlying component of TMJ disorder suspected as well - currently seeing Dr. Gabriela BARTLETT [facility] for this, with suspected flair from initial evaluation and testing.   - OMT performed again today to help with symptom relief.   - Continue proper posture and work desk and desk accommodations to facilitate good posture.  - Continue follow up with Dr. Yeh in pain management, with discussion of possible sphenopalatine ganglion block  - patient planning on starting formal physical therapy for TMJ for the next 6 weeks  - X-ray images of cervical spine taken 5/2/24 (AP, lateral, bilateral obliques, and odontoid  flexion and extension views) showed No acute osseous abnormality seen. Straightening of the normal cervical lordosis which may be positional or related to muscle spasm.      2. OMT 3-4 regions. Oral consent obtained.  Reviewed benefits and potential side effects.   - OMT indicated today due to signs and symptoms as well as local and remote somatic dysfunction findings and their related neurokinetic, lymphatic, fascial and/or arteriovenous body connections.   - OMT techniques used: Myofascial Release, Muscle Energy, Still's Technique, and Cranial    - Treatment was tolerated well. Improvement noted in segmental mobility post-treatment in dysfunctional regions. There were no adverse events and no complications immediately following treatment.      3. Pt. Continue the following HEP:  A) Cervicothoracic junction mobilization exercise: 10-15 reps, twice daily. Handout also given.    B) Sidelying reach and roll  stretch: Laying on your side with arms extend out, pull back top arm and flex at the elbow, then rotate thoracic spine and open up arm and chest. Repeat for a total of 10 reps on each side, twice daily. Hand-out also given.   C) Scalene, upper trapezius, and levator scapulae stretches : hold neck sidebending stretch for 30 seconds in each plane, bilaterally, twice daily. Hand out given.    D) Seated anterior pelvic tilt exercise: rotate pelvis forward to sit on ischial tuberosities while maintaining neutral shoulder positioning. Repeat frequently throughout the day.       The patient was taught a homegoing physical therapy regimen as described above. The patient demonstrated understanding of the exercises and proper technique of their execution.      4. Follow-up following formal PT, if symptoms persist     5. Patient agreeable to today's plan and all questions were answered     This note is dictated using the M*Modal Fluency Direct word recognition program. There are word recognition mistakes that are occasionally missed on review.     Total time spent face-to face with patient counseling or coordinating care including prognosis, differential diagnosis, risks and benefits of treatment, instructions, compliance risk reductions as well as non-face-to-face time personally spent reviewing medial record, medical documentation, and coordination of care.     EST MINUTES X   [id number] 10-19    [id number] 20-29    [id number] 30-39 x   [id number] 40-54    NEW     [id number] 15-29    [id number] 30-44    [id number] 45-59    [id number] 60-74    PHONE      5-10    [id number] 11-20    [id number] 21-30

## 2024-09-16 ENCOUNTER — OFFICE VISIT (OUTPATIENT)
Dept: SPORTS MEDICINE | Facility: CLINIC | Age: 34
End: 2024-09-16
Payer: COMMERCIAL

## 2024-09-16 VITALS — DIASTOLIC BLOOD PRESSURE: 72 MMHG | HEART RATE: 71 BPM | SYSTOLIC BLOOD PRESSURE: 115 MMHG

## 2024-09-16 DIAGNOSIS — M99.08 SOMATIC DYSFUNCTION OF RIB CAGE REGION: ICD-10-CM

## 2024-09-16 DIAGNOSIS — M99.00 SOMATIC DYSFUNCTION OF HEAD REGION: ICD-10-CM

## 2024-09-16 DIAGNOSIS — M79.10 MYALGIA: ICD-10-CM

## 2024-09-16 DIAGNOSIS — G44.059 SHORT LASTING UNILATERAL NEURALGIFORM HEADACHE WITH CONJUNCTIVAL INJECTION AND TEARING (SUNCT), NOT INTRACTABLE: Primary | ICD-10-CM

## 2024-09-16 DIAGNOSIS — M54.2 CERVICALGIA: ICD-10-CM

## 2024-09-16 DIAGNOSIS — M99.01 CERVICAL (NECK) REGION SOMATIC DYSFUNCTION: ICD-10-CM

## 2024-09-16 DIAGNOSIS — M26.609 TMJ (TEMPOROMANDIBULAR JOINT DISORDER): ICD-10-CM

## 2024-09-16 DIAGNOSIS — M99.02 SOMATIC DYSFUNCTION OF THORACIC REGION: ICD-10-CM

## 2024-09-16 PROCEDURE — 3078F DIAST BP <80 MM HG: CPT | Mod: CPTII,S$GLB,, | Performed by: NEUROMUSCULOSKELETAL MEDICINE & OMM

## 2024-09-16 PROCEDURE — 99214 OFFICE O/P EST MOD 30 MIN: CPT | Mod: 25,S$GLB,, | Performed by: NEUROMUSCULOSKELETAL MEDICINE & OMM

## 2024-09-16 PROCEDURE — 98926 OSTEOPATH MANJ 3-4 REGIONS: CPT | Mod: S$GLB,,, | Performed by: NEUROMUSCULOSKELETAL MEDICINE & OMM

## 2024-09-16 PROCEDURE — 1160F RVW MEDS BY RX/DR IN RCRD: CPT | Mod: CPTII,S$GLB,, | Performed by: NEUROMUSCULOSKELETAL MEDICINE & OMM

## 2024-09-16 PROCEDURE — 3074F SYST BP LT 130 MM HG: CPT | Mod: CPTII,S$GLB,, | Performed by: NEUROMUSCULOSKELETAL MEDICINE & OMM

## 2024-09-16 PROCEDURE — 99999 PR PBB SHADOW E&M-EST. PATIENT-LVL III: CPT | Mod: PBBFAC,,, | Performed by: NEUROMUSCULOSKELETAL MEDICINE & OMM

## 2024-09-16 PROCEDURE — 1159F MED LIST DOCD IN RCRD: CPT | Mod: CPTII,S$GLB,, | Performed by: NEUROMUSCULOSKELETAL MEDICINE & OMM

## 2024-09-16 RX ORDER — MEMANTINE HYDROCHLORIDE 10 MG/1
TABLET ORAL
COMMUNITY

## 2024-10-04 ENCOUNTER — TELEPHONE (OUTPATIENT)
Dept: PAIN MEDICINE | Facility: CLINIC | Age: 34
End: 2024-10-04
Payer: COMMERCIAL

## 2024-10-04 NOTE — TELEPHONE ENCOUNTER
Staff tried contacting the patient in regards to her message about another Occipital Nerve Block. Patient did not answer staff left a message.

## 2024-10-04 NOTE — TELEPHONE ENCOUNTER
----- Message from Loki sent at 10/4/2024  2:45 PM CDT -----  Type:  Patient Returning Call    Who Called:     Who Left Message for Patient:     Does the patient know what this is regarding?:  missed call     Best Call Back Number:   [phone]    Additional Information:

## 2024-10-07 NOTE — PROGRESS NOTES
Subjective:     Maricel Jha    Chief Complaint   Patient presents with    Right Lower Leg - Swelling     HPI    Maricel is a 34 y.o. female coming in today for right calf pain. Since last visit the pain has Improved then deteriorated this past weekend with playing pickelball.  Pt is compliant with HEP. The pain is better with rest and worse with activity, night time. Pt. describes the pain as a 5/10 achy pain that does not radiate. There has not been any new a fall/injury/ or traumas since last visit.  Pt. denies any new musculoskeletal complaints at this time.     Office note from 7/29/24 reviewed    PAST MEDICAL HISTORY: History reviewed. No pertinent past medical history.  PAST SURGICAL HISTORY: History reviewed. No pertinent surgical history.  FAMILY HISTORY: No family history on file.    SOCIAL HISTORY:   Social History     Socioeconomic History    Marital status:    Tobacco Use    Smoking status: Never    Smokeless tobacco: Never     Social Drivers of Health     Financial Resource Strain: Low Risk  (7/11/2024)    Received from [facility]    Overall Financial Resource Strain (CARDIA)     Difficulty of Paying Living Expenses: Not hard at all   Food Insecurity: No Food Insecurity (7/11/2024)    Received from [facility]    Hunger Vital Sign     Worried About Running Out of Food in the Last Year: Never true     Ran Out of Food in the Last Year: Never true   Transportation Needs: No Transportation Needs (7/11/2024)    Received from [facility]    PRAPARE - Transportation     Lack of Transportation (Medical): No     Lack of Transportation (Non-Medical): No   Physical Activity: Sufficiently Active (7/11/2024)    Received [facility]    Exercise Vital Sign     Days of Exercise per Week: 3 days     Minutes of Exercise per Session: 120 min   Recent Concern: Physical Activity - Insufficiently Active (4/16/2024)    Received from [facility]    Exercise Vital Sign     Days of Exercise per Week: 2 days      "Minutes of Exercise per Session: 60 min   Stress: No Stress Concern Present (7/11/2024)    Received from [facility] - Occupational Stress Questionnaire     Feeling of Stress : Not at all   Housing Stability: Unknown (7/6/2024)    Housing Stability Vital Sign     Unable to Pay for Housing in the Last Year: No     MEDICATIONS:   Current Outpatient Medications:     AJOVY AUTOINJECTOR 225 mg/1.5 mL autoinjector, Inject into the skin., Disp: , Rfl:     carBAMazepine 100 mg/5 mL (5 mL) Susp, , Disp: , Rfl:     cetirizine (ZYRTEC) 10 MG tablet, , Disp: , Rfl:     cholecalciferol, vitamin D3, 125 mcg (5,000 unit) capsule, , Disp: , Rfl:     lamoTRIgine 100 mg TbDL, , Disp: , Rfl:     magnesium 200 mg Tab, , Disp: , Rfl:     memantine (NAMENDA) 10 MG Tab, Take by mouth., Disp: , Rfl:     montelukast (SINGULAIR) 10 mg tablet, , Disp: , Rfl:     orphenadrine/aspirin/caffeine (NORGESIC FORTE ORAL), , Disp: , Rfl:     progesterone (PROMETRIUM) 100 MG capsule, Take 100 mg by mouth once daily., Disp: , Rfl:     rizatriptan (MAXALT) 10 MG tablet, Take by mouth., Disp: , Rfl:     ubrogepant (UBRELVY ORAL), Take by mouth., Disp: , Rfl:     vitamin B comp and C no.3 (B COMPLEX PLUS VITAMIN C) 15-10-50-5-300 mg Cap, , Disp: , Rfl:     ALLERGIES:   Review of patient's allergies indicates:   Allergen Reactions    Nsaids (non-steroidal anti-inflammatory drug) Rash and Shortness Of Breath    Sumatriptan Other (See Comments)     Severe neck and back pain    Ibuprofen Other (See Comments)     Objective:     VITAL SIGNS: BP 96/65   Pulse (!) 57   Ht 5' 6" (1.676 m)   Wt 93.2 kg (205 lb 7.5 oz)   BMI 33.16 kg/m²    General    Vitals reviewed.  Constitutional: She is oriented to person, place, and time. She appears well-developed and well-nourished.   Neurological: She is alert and oriented to person, place, and time.   Psychiatric: She has a normal mood and affect. Her behavior is normal. "           MUSCULOSKELETAL EXAM:    right KNEE EXAMINATION   Affected side is compared to contralateral knee     Observation:  No edema, erythema, ecchymosis, or effusion noted.  No muscle atrophy of the thighs and calves noted.  No obvious bony deformities noted.   No Genu valgus/varum noted.  No recurvatum noted.    No tibial internal/external torsion.    Posture:  Upright  Gait: Right antalgic with Neutral ankle mechanics and Neutral medial arch    Tenderness:  Patella - none    Lateral joint line - none  Quad tendon - none   Medial joint line - none  Patellar tendon - none  Medial plica - none  Tibial tubercle - none   Lateral plica - none  Pes anserine - none   MCL prox - none  Distal ITB - none   MCL distal - none  MFC - none    LCL prox - none  LFC - none    LCL distal - none  Tibia - none    Fibula - none    + mild media gastrocnemius tenderness    No obvious bursae, plicae, popliteal cysts, or tendon derangement palpated.          ROM (* = with pain):   Active extension to 0° on left without hyperextension, lag, crepitus, or patellar J sign.   Active extension to 0° on right without hyperextension, lag, crepitus, or patellar J sign.  Active flexion to 135° on left and 135° on right    Strength(* = with pain):  Knee Flexion - 5/5 on left and 5/5 on right  Knee Extension - 5/5 on left and 5/5 on right  Hip Flexion - 5/5 on left and 5/5 on right  Hip Extension - 5/5 on left and 5/5 on right  Ankle dorsiflexion - 5/5 on left and 5/5 on right  Ankle Plantarflexion - 5/5 on left and 5/5 on right    Patellofemoral Exam:   Patellar ballottement - negative  Bulge sign - negative  Patellar grind - negative    No patellar laxity with medial and lateral translation   No apprehension with medial and lateral patellar translation.     Meniscus Testing:     No pain with terminal extension and flexion at joint lines.  Lidias test - negative   Thesaly test - negative  Bounce home test - negative    Ligament  Testing:  Lachman's test - negative  No laxity with anterior drawer.  No laxity with posterior drawer.    No posterior sag sign.   No laxity with varus testing at 0 and 30 degrees.  No laxity with valgus testing at 0 and 30 degrees.    TART (Tissue texture abnormality, Asymmetry,  Restriction of motion and/or Tenderness) changes:     Lower extremity:  Right medial gastrocnemius Herniated trigger point (HTP) fascial distortion, R anterior fibular head    Key   F= Flexed   E = Extended   R = Rotated   S = Sidebent   TTA = tissue texture abnormality     Neurovascular Examination:   Sensation intact to light touch in the obturator, lateral/intermediate/medial/posterior femoral cutaneous, saphenous, and common peroneal nerves bilaterally.  Motor Function:    Fully intact motor function at hip, knee, foot and ankle.  DTRs: 2+/4 reflexes at L4 and S1 dermatomes.   Negative seated straight leg raise bilaterally.    Pulses intact at the DP and PT arteries bilaterally.    Capillary refill intact <2 seconds in all toes bilaterally.    Assessment:      Encounter Diagnoses   Name Primary?    Strain of right gastrocnemius muscle, sequela Yes    Somatic dysfunction of lower extremity           Plan:     1. Recurrent irritation of the medial gastrocnemius muscle strains with full strength on exam and no associated significant ecchymosis.  - OMT performed again today to address associated biomechanical and fascial restrictions and HEP reviewed  - Recommend over-the-counter Tylenol, ice up to 20 minutes at a time, and compression as needed for pain control.  Patient allergic to NSAIDs.  - Referral to outpatient PT ([facility]) for eccentric exercise program, ankle stability, and home exercise program  - X-ray images of right knee taken 7/9/24 (AP bilateral standing, PA bilateral standing in flexion, bilateral merchants, and  right lateral views) showed no abnormalities.     2. OMT 1-2 regions. Oral consent obtained.  Reviewed  benefits and potential side effects, including bruising at site of treatment and increased soreness for the next 24-48 hours. Pt. Instructed to increased water intake by 1 L today and tomorrow to help with any residual soreness.   - OMT indicated today due to signs and symptoms as well as local and remote somatic dysfunction findings and their related neurokinetic, lymphatic, fascial and/or arteriovenous body connections.   - OMT techniques used: Muscle Energy and Fascial Distortion Model   - Treatment was tolerated well. Improvement noted in segmental mobility post-treatment in dysfunctional regions. There were no adverse events and no complications immediately following treatment.     3. Pt. to continue the following HEP:  A) Bilateral Calf stretching with knee flexed and extended: hold stretch for 30 seconds, repeating 2-3 times on each side. Do stretch twice daily  B) Eccentric calf stretches:  Lower heels off of a step slowly until reaching end rand plantarflexion, repeating 10 reps twice daily.  Handout given    The patient was taught a homegoing physical therapy regimen as described above. The patient demonstrated understanding of the exercises and proper technique of their execution.     4. Follow-up upon completion of PT if pain persist or deteriorates    5. Patient agreeable to today's plan and all questions were answered    This note is dictated using the M*Modal Fluency Direct word recognition program. There are word recognition mistakes that are occasionally missed on review.

## 2024-10-08 ENCOUNTER — OFFICE VISIT (OUTPATIENT)
Dept: SPORTS MEDICINE | Facility: CLINIC | Age: 34
End: 2024-10-08
Payer: COMMERCIAL

## 2024-10-08 VITALS
SYSTOLIC BLOOD PRESSURE: 96 MMHG | HEART RATE: 57 BPM | WEIGHT: 205.5 LBS | HEIGHT: 66 IN | BODY MASS INDEX: 33.03 KG/M2 | DIASTOLIC BLOOD PRESSURE: 65 MMHG

## 2024-10-08 DIAGNOSIS — M99.06 SOMATIC DYSFUNCTION OF LOWER EXTREMITY: ICD-10-CM

## 2024-10-08 DIAGNOSIS — S86.111S STRAIN OF RIGHT GASTROCNEMIUS MUSCLE, SEQUELA: Primary | ICD-10-CM

## 2024-10-08 PROCEDURE — 98925 OSTEOPATH MANJ 1-2 REGIONS: CPT | Mod: S$GLB,,, | Performed by: NEUROMUSCULOSKELETAL MEDICINE & OMM

## 2024-10-08 PROCEDURE — 3074F SYST BP LT 130 MM HG: CPT | Mod: CPTII,S$GLB,, | Performed by: NEUROMUSCULOSKELETAL MEDICINE & OMM

## 2024-10-08 PROCEDURE — 3008F BODY MASS INDEX DOCD: CPT | Mod: CPTII,S$GLB,, | Performed by: NEUROMUSCULOSKELETAL MEDICINE & OMM

## 2024-10-08 PROCEDURE — 3078F DIAST BP <80 MM HG: CPT | Mod: CPTII,S$GLB,, | Performed by: NEUROMUSCULOSKELETAL MEDICINE & OMM

## 2024-10-08 PROCEDURE — 1159F MED LIST DOCD IN RCRD: CPT | Mod: CPTII,S$GLB,, | Performed by: NEUROMUSCULOSKELETAL MEDICINE & OMM

## 2024-10-08 PROCEDURE — 1160F RVW MEDS BY RX/DR IN RCRD: CPT | Mod: CPTII,S$GLB,, | Performed by: NEUROMUSCULOSKELETAL MEDICINE & OMM

## 2024-10-08 PROCEDURE — 99999 PR PBB SHADOW E&M-EST. PATIENT-LVL IV: CPT | Mod: PBBFAC,,, | Performed by: NEUROMUSCULOSKELETAL MEDICINE & OMM

## 2024-10-08 PROCEDURE — 99213 OFFICE O/P EST LOW 20 MIN: CPT | Mod: 25,S$GLB,, | Performed by: NEUROMUSCULOSKELETAL MEDICINE & OMM

## 2024-10-14 ENCOUNTER — PATIENT MESSAGE (OUTPATIENT)
Dept: PAIN MEDICINE | Facility: CLINIC | Age: 34
End: 2024-10-14
Payer: COMMERCIAL

## 2024-10-15 ENCOUNTER — CLINICAL SUPPORT (OUTPATIENT)
Dept: REHABILITATION | Facility: HOSPITAL | Age: 34
End: 2024-10-15
Payer: COMMERCIAL

## 2024-10-15 DIAGNOSIS — R29.898 DECREASED STRENGTH OF LOWER EXTREMITY: Primary | ICD-10-CM

## 2024-10-15 DIAGNOSIS — S86.111S STRAIN OF RIGHT GASTROCNEMIUS MUSCLE, SEQUELA: ICD-10-CM

## 2024-10-15 PROCEDURE — 97161 PT EVAL LOW COMPLEX 20 MIN: CPT

## 2024-10-15 PROCEDURE — 97112 NEUROMUSCULAR REEDUCATION: CPT

## 2024-10-18 ENCOUNTER — TELEPHONE (OUTPATIENT)
Dept: PAIN MEDICINE | Facility: CLINIC | Age: 34
End: 2024-10-18
Payer: COMMERCIAL

## 2024-10-18 NOTE — TELEPHONE ENCOUNTER
Staff spoke with patient to get her appointment reschedule due to him being out of clinic on 10/21. Staff rescheduled her to 11/12. Patient is in agreement to the above and verbalized understanding.

## 2024-10-24 ENCOUNTER — CLINICAL SUPPORT (OUTPATIENT)
Dept: REHABILITATION | Facility: HOSPITAL | Age: 34
End: 2024-10-24
Payer: COMMERCIAL

## 2024-10-24 DIAGNOSIS — R29.898 DECREASED STRENGTH OF LOWER EXTREMITY: Primary | ICD-10-CM

## 2024-10-24 PROCEDURE — 97112 NEUROMUSCULAR REEDUCATION: CPT

## 2024-10-24 PROCEDURE — 97530 THERAPEUTIC ACTIVITIES: CPT

## 2024-10-24 PROCEDURE — 97140 MANUAL THERAPY 1/> REGIONS: CPT

## 2024-10-24 PROCEDURE — 97110 THERAPEUTIC EXERCISES: CPT

## 2024-10-28 PROBLEM — R29.898 DECREASED STRENGTH OF LOWER EXTREMITY: Status: ACTIVE | Noted: 2024-10-28

## 2024-10-31 ENCOUNTER — TELEPHONE (OUTPATIENT)
Dept: PAIN MEDICINE | Facility: CLINIC | Age: 34
End: 2024-10-31
Payer: COMMERCIAL

## 2024-10-31 ENCOUNTER — CLINICAL SUPPORT (OUTPATIENT)
Dept: REHABILITATION | Facility: HOSPITAL | Age: 34
End: 2024-10-31
Payer: COMMERCIAL

## 2024-10-31 ENCOUNTER — PATIENT MESSAGE (OUTPATIENT)
Dept: PAIN MEDICINE | Facility: CLINIC | Age: 34
End: 2024-10-31
Payer: COMMERCIAL

## 2024-10-31 DIAGNOSIS — R29.898 DECREASED STRENGTH OF LOWER EXTREMITY: Primary | ICD-10-CM

## 2024-10-31 PROCEDURE — 97140 MANUAL THERAPY 1/> REGIONS: CPT

## 2024-10-31 PROCEDURE — 97110 THERAPEUTIC EXERCISES: CPT

## 2024-10-31 PROCEDURE — 97112 NEUROMUSCULAR REEDUCATION: CPT

## 2024-11-04 ENCOUNTER — PATIENT MESSAGE (OUTPATIENT)
Dept: PAIN MEDICINE | Facility: CLINIC | Age: 34
End: 2024-11-04
Payer: COMMERCIAL

## 2024-11-05 ENCOUNTER — OFFICE VISIT (OUTPATIENT)
Dept: PAIN MEDICINE | Facility: CLINIC | Age: 34
End: 2024-11-05
Payer: COMMERCIAL

## 2024-11-05 VITALS
TEMPERATURE: 96 F | BODY MASS INDEX: 30.82 KG/M2 | HEIGHT: 66 IN | HEART RATE: 55 BPM | WEIGHT: 191.81 LBS | RESPIRATION RATE: 12 BRPM | DIASTOLIC BLOOD PRESSURE: 63 MMHG | OXYGEN SATURATION: 100 % | SYSTOLIC BLOOD PRESSURE: 96 MMHG

## 2024-11-05 DIAGNOSIS — M79.18 MYOFASCIAL PAIN: ICD-10-CM

## 2024-11-05 DIAGNOSIS — M54.81 OCCIPITAL NEURALGIA OF RIGHT SIDE: Primary | ICD-10-CM

## 2024-11-05 DIAGNOSIS — M54.81 BILATERAL OCCIPITAL NEURALGIA: ICD-10-CM

## 2024-11-05 DIAGNOSIS — G89.4 CHRONIC PAIN DISORDER: ICD-10-CM

## 2024-11-05 DIAGNOSIS — G44.021 INTRACTABLE CHRONIC CLUSTER HEADACHE: ICD-10-CM

## 2024-11-05 PROCEDURE — 3008F BODY MASS INDEX DOCD: CPT | Mod: CPTII,S$GLB,,

## 2024-11-05 PROCEDURE — 3074F SYST BP LT 130 MM HG: CPT | Mod: CPTII,S$GLB,,

## 2024-11-05 PROCEDURE — 99214 OFFICE O/P EST MOD 30 MIN: CPT | Mod: S$GLB,,,

## 2024-11-05 PROCEDURE — 1159F MED LIST DOCD IN RCRD: CPT | Mod: CPTII,S$GLB,,

## 2024-11-05 PROCEDURE — 3078F DIAST BP <80 MM HG: CPT | Mod: CPTII,S$GLB,,

## 2024-11-05 PROCEDURE — 1160F RVW MEDS BY RX/DR IN RCRD: CPT | Mod: CPTII,S$GLB,,

## 2024-11-05 PROCEDURE — 99999 PR PBB SHADOW E&M-EST. PATIENT-LVL IV: CPT | Mod: PBBFAC,,,

## 2024-11-05 NOTE — PROGRESS NOTES
Interventional Pain Management - Established Visit  Follow-Up     PCP: Jackie Valenzuela MD    REFERRING PHYSICIAN: No ref. provider found    CHIEF COMPLAINT: Headaches    Original HISTORY OF PRESENT ILLNESS: Maricel Jha presents to the clinic for the evaluation of the above pain. The pain started 3 years ago.     Original Pain Description:  The patient is a 34 year old female with pmh of AIDA who presents for evaluation and treatment of headaches. The headaches started 3 years ago with no inciting factor. The pain is right sided and is located in the upper neck/base of the skull and spread towards the right side of the face. Pain in the back is described as a soreness, pain in the front is more of a stabbing and searing pain.  Often she will get episodes of tearing and facial flushing. Exacerbating factors: Stress, wind, brushing teeth, brushing hair. Mitigating factors: medication. Symptoms interfere with work and quality of life. The patient feels like symptoms have been unchanged. No photophobia, nausea, vomiting, auras.     She recently moved from [place] to [place]. She see's a neurologist in [place] who researches AIDA and is happy with her care there. Her neurologist over there manages her medications. She gets cervical trigger point injections and greater occipital nerve blocks about every 6-8 weeks. These give her about 75% relief for 4 weeks. Steroids are added about every other injection. She has had 6 injections so far, last one was earlier this week. Overall, she is happy with her treatment and wants to continue as is for now.     Original PAIN SCORES:  Best: Pain is 2  Worst: Pain is 9  Current: Pain is 4        11/5/2024     3:16 PM   Last 3 PDI Scores   Pain Disability Index (PDI) 57     INTERVAL HISTORY: (Newest visit at the bottom)   Interval History (7/1/2024):   34 year old female returns in follow up for occipital neuralgia. Patient had bilateral occipital nerve blocks on 6/17/2024.  Patient reports no benefit. She localizes pain to the right occipital prominence. Typically got good benefit with landmark guided procedure. No benefit with US guided procedure. Patient still follows with her [place] Neurologist via virtual visits.  Patient denies recent falls, trauma, hospitalizations, or infections.     Interval History 8/8/2024:   Patient following up for occipital neuralgia. Patient said occipital nerve block every 6 weeks usually helps and would like to be scheduled to have the procedure done. Her scheduled right sphenopalatine block procedure was cancelled due to high co pays. Patient said her headaches and neck pain is not controlled with her current medication regimen. Her neurologist added on memantine to her current medication. Her pain today is 2/10. Denied fevers, vomiting, visual changes or difficulties with balance or ambulation.     Interval History 11/5/2024:  Maricel Jha returns to clinic for follow-up after right greater and lesser occipital nerve block without steroids on 9/3/2024. She reports 100% pain relief for 10 weeks. She would like to repeat this at this time. She has stopped memantine and is taking Keppra BID with good relief.  She denies any perceived side effects. She has been doing jaw physical therapy with orofacial dentist and PT which has been providing significant relief. She does not think she needs any trigeminal interventions at this time. She denies recent health changes. She denies recent falls or trauma. She denies new onset fever/night sweats, urinary incontinence, bowel incontinence, significant weight changes, significant motor weakness or changes, or loss of sensations. Her pain today is 7/10.      6 weeks of Conservative therapy:  PT: n/a (Must include dates)  HEP: n/a    Treatments / Medications: (Ice/Heat/NSAIDS/APAP/etc):  Carbamazepine  Ajovy  Maxalt  Ubrelvy  Lamotrigine  Memantin     Interventional Pain Procedures:   Right Occipital nerve blocks    9/3/2024 - Right Greater and lesser OCCIPITAL NERVE BLOCK (no steroids) - 100% pain relief x 10 weeks       History reviewed. No pertinent past medical history.  History reviewed. No pertinent surgical history.  Social History     Socioeconomic History    Marital status:    Tobacco Use    Smoking status: Never    Smokeless tobacco: Never     Social Drivers of Health     Financial Resource Strain: Low Risk  (7/11/2024)    Received from [facility]    Overall Financial Resource Strain (CARDIA)     Difficulty of Paying Living Expenses: Not hard at all   Food Insecurity: No Food Insecurity (7/11/2024)    Received from [facility]    Hunger Vital Sign     Worried About Running Out of Food in the Last Year: Never true     Ran Out of Food in the Last Year: Never true   Transportation Needs: No Transportation Needs (7/11/2024)    Received from [facility]    PRAPARE - Transportation     Lack of Transportation (Medical): No     Lack of Transportation (Non-Medical): No   Physical Activity: Sufficiently Active (7/11/2024)    Received from [facility]    Exercise Vital Sign     Days of Exercise per Week: 3 days     Minutes of Exercise per Session: 120 min   Recent Concern: Physical Activity - Insufficiently Active (4/16/2024)    Received from [facility]    Exercise Vital Sign     Days of Exercise per Week: 2 days     Minutes of Exercise per Session: 60 min   Stress: No Stress Concern Present (7/11/2024)    Received from [facility]    [place] [facility] [employer] - Occupational Stress Questionnaire     Feeling of Stress : Not at all   Housing Stability: Unknown (7/6/2024)    Housing Stability Vital Sign     Unable to Pay for Housing in the Last Year: No     No family history on file.    Review of patient's allergies indicates:   Allergen Reactions    Nsaids (non-steroidal anti-inflammatory drug) Rash and Shortness Of Breath    Sumatriptan Other (See Comments)     Severe neck and back pain    Ibuprofen  "Other (See Comments)       Current Outpatient Medications   Medication Sig    AJOVY AUTOINJECTOR 225 mg/1.5 mL autoinjector Inject into the skin.    carBAMazepine 100 mg/5 mL (5 mL) Susp     cetirizine (ZYRTEC) 10 MG tablet     cholecalciferol, vitamin D3, 125 mcg (5,000 unit) capsule     lamoTRIgine 100 mg TbDL     magnesium 200 mg Tab     montelukast (SINGULAIR) 10 mg tablet     orphenadrine/aspirin/caffeine (NORGESIC FORTE ORAL)     progesterone (PROMETRIUM) 100 MG capsule Take 100 mg by mouth once daily.    rizatriptan (MAXALT) 10 MG tablet Take by mouth.    ubrogepant (UBRELVY ORAL) Take by mouth.    vitamin B comp and C no.3 (B COMPLEX PLUS VITAMIN C) 15-10-50-5-300 mg Cap     memantine (NAMENDA) 10 MG Tab Take by mouth.     No current facility-administered medications for this visit.     ROS:  GENERAL: No fever. No chills. No fatigue.   HEENT: + tearing  CV: Denies chest pain.   PULM: Denies of shortness of breath.  GI: Denies constipation. No diarrhea. No abdominal pain. Denies nausea. Denies vomiting. No blood in stool.  HEME: Denies bleeding problems.  : Denies urgency. No painful urination. No blood in urine.  MS: + neck pain  SKIN: Denies rash.   NEURO: + headaches, no weakness  PSYCH:  Denies difficulty sleeping. No anxiety. Denies depression. No suicidal thoughts.     VITALS:   Vitals:    11/05/24 1513   BP: 96/63   Pulse: (!) 55   Resp: 12   Temp: 96 °F (35.6 °C)   TempSrc: Oral   SpO2: 100%   Weight: 87 kg (191 lb 12.8 oz)   Height: 5' 6" (1.676 m)   PainSc:   7   PainLoc: Back     PHYSICAL EXAM:   GENERAL: Well appearing, in no acute distress, alert and oriented x3.  PSYCH:  Mood and affect appropriate.  SKIN: Skin color, texture, turgor normal, no rashes or lesions.  HEENT:  Normocephalic, atraumatic. Cranial nerves grossly intact. Mild tenderness to palpation over the cervical paraspinal muscles and occipital ridge bilaterally.   Full ROM with flexion, extension, lateral rotation. Lhermitte's " and Spurling's are negative  PULM: No evidence of respiratory difficulty, symmetric chest rise.  GI:  Non-distended  EXTREMITIES: No deformities, edema, or skin discoloration.   NEURO: CN II-XII are intact. Sensation is equal and appropriate bilaterally. Bilateral upper and lower extremity strength is normal and symmetric. Bilateral upper and lower extremity coordination and muscle stretch reflexes are physiologic and symmetric. Plantar response are downgoing. Negative Hoffmans.No clonus  GAIT: normal.    IMAGING:    EXAM: Brain w/wo contrast MRI     DATE: 5/29/2021 11:00     INDICATION:  - concern for brainstem MS/infalmmatory/demyelinating condition, patient with trigeminal pain/redness x4 weeks. please thin cuts brain stem.     COMPARISON: CTA 5/29/2021     TECHNIQUE: Multiplanar multisequence images of the brain were obtained before and after the intravenous administration of contrast material.     DISCUSSION:     No acute parenchymal abnormality.   No hydrocephalus, midline shift, mass effect, restricted diffusion or acute hemorrhage.   Pineal region, pituitary gland, cranio cervical junction, orbits and internal auditory canals are unremarkable.   Major intracranial flow-voids are well-maintained.   No mass or abnormal enhancement. The cisternal segments of the trigeminal nerves are unremarkable with no abnormal enhancement.   No osseous lesions.     IMPRESSION:     Unremarkable MRI of the brain with and without contrast.     ASSESSMENT: 34 y.o. year old female with pain, consistent with:    Encounter Diagnoses   Name Primary?    Occipital neuralgia of right side Yes    Chronic pain disorder     Myofascial pain     Intractable chronic cluster headache     Bilateral occipital neuralgia        DISCUSSION: Maricel Jha is a 34 year old who comes to us with AIDA and occipital neuralgia. She has received significant relief with her medications and cervical TPI and occipital nerve blocks and is happy with her  treatment as is currently.     PLAN:  Schedule for in office landmark guided right occipital nerve block. Patient requesting no US, only landmark.  Previous imaging was reviewed and discussed with the patient today.   Recommend that she continue to follow up with her neurologist in [place]  Continue current medication regimen as directed by neurologist.   RTC 2 weeks after above procedure.     Sabra Durand NP  11/05/2024

## 2024-11-07 ENCOUNTER — CLINICAL SUPPORT (OUTPATIENT)
Dept: REHABILITATION | Facility: HOSPITAL | Age: 34
End: 2024-11-07
Payer: COMMERCIAL

## 2024-11-07 DIAGNOSIS — R29.898 DECREASED STRENGTH OF LOWER EXTREMITY: Primary | ICD-10-CM

## 2024-11-07 PROCEDURE — 97110 THERAPEUTIC EXERCISES: CPT

## 2024-11-07 PROCEDURE — 97140 MANUAL THERAPY 1/> REGIONS: CPT

## 2024-11-07 PROCEDURE — 97112 NEUROMUSCULAR REEDUCATION: CPT

## 2024-11-07 NOTE — PROGRESS NOTES
[facility]   Physical Therapy Treatment Note     Name: Maricel Jha  Clinic Number: [id number]    Therapy Diagnosis:   Encounter Diagnosis   Name Primary?    Decreased strength of lower extremity Yes     Physician: Divina Patel DO    Visit Date: 11/7/2024  Physician Orders: PT Eval and Treat   Medical Diagnosis from Referral: Strain of right gastrocnemius muscle, sequela [S86.111S]   Evaluation Date: 10/15/2024  Authorization Period Expiration: 12/31/2024  Plan of Care Expiration: 12/31/2024  Progress Note Due: 11/20/2024  Visit # / Visits authorized: 3/20  FOTO: 1/3    PTA Visit #: 0/5     FOTO first follow up:   FOTO second follow up:     Time In: 1:02 pm  Time Out: 1:54 pm  Total Billable Time: 52 minutes    SUBJECTIVE   Pt reports: she has new pain symptoms on the right leg and in her arch. She says the leg pain around the outside of her knee radiates down and sometimes feels it in the ankle. She thinks this started after playing pickle ball Tuesday. She was doing significantly better and thought she would not need PT anymore but then this popped up.   She was compliant with home exercise program.  Response to previous treatment: Independent with [facility]  Functional change: on going     Pain: 2/10  Location: right lower leg, calf      OBJECTIVE     Objective Measures updated at progress report unless specified.     Observation 10/31/2024:  Range of Motion:  - Right Knee AROM: 2-0-140 degrees     Ankle    Right AROM/PROM   Plantarflexion  42 degrees / 45 degrees    Dorsiflexion  -2 degrees (lacking) / 2 degrees     Lower Extremity Strength:   Right   Plantarflexion  (Modified supine) 4/5   Dorsiflexion  3+/5   Inversion  4/5   Eversion  4/5     Observation 11/7/2024:  SLUMP TEST  Right: positive with common peroneal nerve bias  Left: (-)       Treatment     Maricel received the treatments listed below:      therapeutic exercises to develop strength, endurance, ROM, flexibility,  "posture, and core stabilization for 10 minutes including:    (B) Sidelying hip Abduction 3 x 10 reps ea  (B) Seated hip ER RedTB 2 x 12 reps     Not performed:  Long sitting calf stretch with strap   Modified lunge 6" box red band AP talocrural mob 2 x 10 x  5" holds  Seated soleus raises 10# DBs 3 x 15 reps  Box squats 20in box 3 x 10 reps    manual therapy techniques: Joint mobilizations, Myofacial release, and Soft tissue Mobilization were applied to the: ankle for 09 minutes, including:    Hip/Knee/Ankle Assessment   (R) Long axis hip distraction manipulation grade V  Subtalar mobilizations grade II-III    Not Performed:   AP talocrural mobs grade III-IV  Subtalar rocking    neuromuscular re-education activities to improve: Balance, Coordination, Kinesthetic, Sense, Proprioception, and Posture for 33 minutes. The following activities were included:    Assessment as above  Pt education on findings and importance of off-loading involved tissue   Slump sliders 2 x 15 reps   Bridge with GreenTB and short arches 2 x 10 x 3-5" holds   DL heel raise with ball between heels 3 x 15 reps  FHL with manual resistance and YellowTB 2 x 10 reps     Not Performed:  FHL big toe flexion with manual resistance from therapist  - eccentric focus  Short arches in sitting 2 x 10 reps x 5" holds    therapeutic activities to improve functional performance for 00 minutes, including:    Not performed:  Sled Pulls 50# 2 laps     gait training to improve functional mobility and safety for 00 minutes, including:    Patient Education and Home Exercises     Home Exercises Provided and Patient Education Provided     Education provided:   - HEP, Prognosis, PT POC    Written Home Exercises Provided: Patient instructed to cont prior HEP. Exercises were reviewed and Maricel was able to demonstrate them prior to the end of the session.  Maricel demonstrated good  understanding of the education provided. See EMR under Patient Instructions for exercises " provided during therapy sessions    ASSESSMENT     Maricel presents to session with reports of new onset of right lower extremity radiating pain around the lateral tibial condyle and lateral ankle. Assessment found a positive neural component during slump test with a common peroneal nerve bias. Following manual therapy improving hip mobility improvement in symptoms and slump assessment. Further emphasis on improving hip and foot intrinsic strengthening at today's session. Patient educated on exercises at home adding in nerve glides for improvement in neural mobility. Will continue to monitor and progress as able.     Maricel Is progressing well towards her goals.   Pt prognosis is Good.     Pt will continue to benefit from skilled outpatient physical therapy to address the deficits listed in the problem list box on initial evaluation, provide pt/family education and to maximize pt's level of independence in the home and community environment.     Pt's spiritual, cultural and educational needs considered and pt agreeable to plan of care and goals.     Anticipated barriers to physical therapy: Scheduling     Goals: Short Term Goals: 4 weeks (NOT MET: in progress)  Patient will be independent in initial HEP to help supplement PT.  Patient will improve ankle dorsiflexion range of motion to 5 degrees to help with gait mechanics.  Patient will be able to perform a squat pain free to help with getting up and down at work.      Long Term Goals: 8-10 weeks (NOT MET: in progress)  Patient will be independent in updated HEP to help supplement PT and maintain gains made in PT.  Patient will improve FOTO score to >/= predicted to help supplement PT.   Patient will be able to perform >/= 15 single leg heel raises to help with return to jogging.   Patient goal: Patient will be able to play pickleball without pain.     PLAN     Plan of care Certification: 10/15/2024 to 12/31/2024.     Outpatient Physical Therapy 1-2 times weekly for  10 weeks to include the following interventions: Electrical Stimulation , Gait Training, Manual Therapy, Moist Heat/ Ice, Neuromuscular Re-ed, Patient Education, Self Care, Therapeutic Activities, and Therapeutic Exercise.     FARAZ Gaines  Co-treated by: Nelli De Los Santos, PT, DPT, OCS     I certify that I was present in the room directing the student in service delivery and guiding them using my skilled judgment. As the co-signing therapist I have reviewed the students documentation and am responsible for the treatment, assessment, and plan.

## 2024-11-13 ENCOUNTER — PATIENT MESSAGE (OUTPATIENT)
Dept: REHABILITATION | Facility: HOSPITAL | Age: 34
End: 2024-11-13
Payer: COMMERCIAL

## 2024-11-19 ENCOUNTER — PROCEDURE VISIT (OUTPATIENT)
Dept: PAIN MEDICINE | Facility: CLINIC | Age: 34
End: 2024-11-19
Payer: COMMERCIAL

## 2024-11-19 ENCOUNTER — PATIENT MESSAGE (OUTPATIENT)
Dept: PAIN MEDICINE | Facility: CLINIC | Age: 34
End: 2024-11-19

## 2024-11-19 ENCOUNTER — TELEPHONE (OUTPATIENT)
Dept: PAIN MEDICINE | Facility: CLINIC | Age: 34
End: 2024-11-19

## 2024-11-19 VITALS
WEIGHT: 191.81 LBS | BODY MASS INDEX: 30.82 KG/M2 | HEART RATE: 57 BPM | SYSTOLIC BLOOD PRESSURE: 115 MMHG | HEIGHT: 66 IN | RESPIRATION RATE: 18 BRPM | TEMPERATURE: 98 F | OXYGEN SATURATION: 100 % | DIASTOLIC BLOOD PRESSURE: 85 MMHG

## 2024-11-19 DIAGNOSIS — G89.4 CHRONIC PAIN DISORDER: ICD-10-CM

## 2024-11-19 DIAGNOSIS — M54.81 BILATERAL OCCIPITAL NEURALGIA: Primary | ICD-10-CM

## 2024-11-19 PROCEDURE — 96372 THER/PROPH/DIAG INJ SC/IM: CPT | Mod: S$GLB,,, | Performed by: ANESTHESIOLOGY

## 2024-11-19 NOTE — PROCEDURES
Patient Name: Maricel Jha  MRN: [id number]    INFORMED CONSENT: The procedure, risks, benefits and options were discussed with patient. There are no contraindications to the procedure. The patient expressed understanding and agreed to proceed. The personnel performing the procedure was discussed. I verify that I personally obtained Maricel's consent prior to the start of the procedure and the signed consent can be found on the patient's chart.    Procedure Date: 11/19/2024    Anesthesia: Topical    Pre Procedure diagnosis:   1. Bilateral occipital neuralgia          Post-Procedure diagnosis: same      Sedation: None    PROCEDURE: Bilateral Greater and lesser OCCIPITAL NERVE BLOCK via anatomic guidance   The patient was placed in prone position. The site of pain and procedure were confirmed with the patient prior to starting the procedure. The patient's nuchal ridge of the occipital bone was identified and prepped with chlorhexidine. After performing time out A 27g gauge 1.5 inch was advanced through the skin and subcutaneous tissues lateral to C2 Between the Inferior oblique muscle and semispinalis capitis.  Aspiration for blood and CSF was negative.  A total of 5 ml of  a solution containing 9 ml of Bupivacaine 0.250% and 4mg Dexamethasone was injected at each side.  There were no signs or symptoms of intravascular injection. Same procedure repeated on the contralateral side. No complications were evident. No specimens collected.    Total meds: 9mL 0.5% bupivacaine and 4mg Dexamethasone     Blood Loss: Nill  Specimen: None    Roscoe Murillo MD      I reviewed and edited the resident/fellow's note. I conducted my own interview and physical examination. I agree with the findings and documentation. I was present and supervising all critical portions of the procedure.      Osman Yeh MD

## 2024-11-19 NOTE — TELEPHONE ENCOUNTER
----- Message from Melanie sent at 11/19/2024 12:10 PM CST -----  Regarding: self  Type:  Patient Returning Call     Who Called:self     Who Left Message for Patient:Wanda Lopez MA     Does the patient know what this is regarding?:coming in earlier for procedure today     Would the patient rather a call back or a response via [facility]?  Call back     Best Call Back Number: [phone]       Additional Information:     Thank you.

## 2024-11-20 ENCOUNTER — PATIENT MESSAGE (OUTPATIENT)
Dept: SPORTS MEDICINE | Facility: CLINIC | Age: 34
End: 2024-11-20

## 2024-11-20 ENCOUNTER — OFFICE VISIT (OUTPATIENT)
Dept: SPORTS MEDICINE | Facility: CLINIC | Age: 34
End: 2024-11-20
Payer: COMMERCIAL

## 2024-11-20 VITALS — HEART RATE: 52 BPM | DIASTOLIC BLOOD PRESSURE: 66 MMHG | SYSTOLIC BLOOD PRESSURE: 106 MMHG

## 2024-11-20 DIAGNOSIS — M99.07 UPPER EXTREMITY SOMATIC DYSFUNCTION: ICD-10-CM

## 2024-11-20 DIAGNOSIS — M99.00 SOMATIC DYSFUNCTION OF HEAD REGION: ICD-10-CM

## 2024-11-20 DIAGNOSIS — M99.08 SOMATIC DYSFUNCTION OF RIB CAGE REGION: ICD-10-CM

## 2024-11-20 DIAGNOSIS — M54.2 CERVICALGIA: ICD-10-CM

## 2024-11-20 DIAGNOSIS — M99.02 SOMATIC DYSFUNCTION OF THORACIC REGION: ICD-10-CM

## 2024-11-20 DIAGNOSIS — M79.10 MYALGIA: ICD-10-CM

## 2024-11-20 DIAGNOSIS — G44.059 SHORT LASTING UNILATERAL NEURALGIFORM HEADACHE WITH CONJUNCTIVAL INJECTION AND TEARING (SUNCT), NOT INTRACTABLE: ICD-10-CM

## 2024-11-20 DIAGNOSIS — M99.01 CERVICAL (NECK) REGION SOMATIC DYSFUNCTION: ICD-10-CM

## 2024-11-20 DIAGNOSIS — M26.609 TMJ (TEMPOROMANDIBULAR JOINT DISORDER): Primary | ICD-10-CM

## 2024-11-20 PROCEDURE — 1159F MED LIST DOCD IN RCRD: CPT | Mod: CPTII,S$GLB,, | Performed by: NEUROMUSCULOSKELETAL MEDICINE & OMM

## 2024-11-20 PROCEDURE — 98927 OSTEOPATH MANJ 5-6 REGIONS: CPT | Mod: S$GLB,,, | Performed by: NEUROMUSCULOSKELETAL MEDICINE & OMM

## 2024-11-20 PROCEDURE — 99213 OFFICE O/P EST LOW 20 MIN: CPT | Mod: 25,S$GLB,, | Performed by: NEUROMUSCULOSKELETAL MEDICINE & OMM

## 2024-11-20 PROCEDURE — 3074F SYST BP LT 130 MM HG: CPT | Mod: CPTII,S$GLB,, | Performed by: NEUROMUSCULOSKELETAL MEDICINE & OMM

## 2024-11-20 PROCEDURE — 3078F DIAST BP <80 MM HG: CPT | Mod: CPTII,S$GLB,, | Performed by: NEUROMUSCULOSKELETAL MEDICINE & OMM

## 2024-11-20 PROCEDURE — 1160F RVW MEDS BY RX/DR IN RCRD: CPT | Mod: CPTII,S$GLB,, | Performed by: NEUROMUSCULOSKELETAL MEDICINE & OMM

## 2024-11-20 PROCEDURE — 99999 PR PBB SHADOW E&M-EST. PATIENT-LVL III: CPT | Mod: PBBFAC,,, | Performed by: NEUROMUSCULOSKELETAL MEDICINE & OMM

## 2024-11-20 RX ORDER — LEVETIRACETAM 750 MG/1
750 TABLET ORAL 2 TIMES DAILY
COMMUNITY
Start: 2024-11-12

## 2024-11-20 NOTE — PROGRESS NOTES
Subjective:     Maricel Jha    Chief Complaint   Patient presents with    Pain     Jaw      HPI    Maricel is a 34 y.o. female with PMHx of AIDA coming in today for follow up for right-sided headache/neck pain. Since last visit the pain has Improved with PT for her jaw. She is waiting on her oral appliance. Pt had an occipital block that is helping with the headache. She also started keppra 1 month ago and feels it is heling. The pain is better with stretching, prior OMT type treatments and worse with lack of sleep, stress. Pt. describes the pain as a 5/10 currently.  Patient planning on holding off on any further nerve blocks with Dr. Yeh, as she just started a new headache medication with her neurologist and she wants to see if this works 1st.    Office note from 9/16/24 reviewed    Procedures reviewed:   6/11/24- Bilateral OCCIPITAL NERVE BLOCK UNDER U/S  Dr. Yeh  7/2/24- Right OCCIPITAL NERVE BLOCK  Dr. Yeh  9/3/24- Right Greater and lesser OCCIPITAL NERVE BLOCK via anatomic guidance Dr. Yeh  11/19/24- Bilateral Greater and lesser OCCIPITAL NERVE BLOCK via anatomic guidance - Dr. Yeh    PAST MEDICAL HISTORY: History reviewed. No pertinent past medical history.  PAST SURGICAL HISTORY: History reviewed. No pertinent surgical history.  FAMILY HISTORY: No family history on file.    SOCIAL HISTORY:   Social History     Socioeconomic History    Marital status:    Tobacco Use    Smoking status: Never    Smokeless tobacco: Never     Social Drivers of Health     Financial Resource Strain: Low Risk  (7/11/2024)    Received from [facility]    Overall Financial Resource Strain (CARDIA)     Difficulty of Paying Living Expenses: Not hard at all   Food Insecurity: No Food Insecurity (7/11/2024)    Received from [facility]    Hunger Vital Sign     Worried About Running Out of Food in the Last Year: Never true     Ran Out of Food in the Last Year: Never true   Transportation Needs: No  Transportation Needs (7/11/2024)    Received from [facility] [place]    PRAPARE - Transportation     Lack of Transportation (Medical): No     Lack of Transportation (Non-Medical): No   Physical Activity: Sufficiently Active (7/11/2024)    Received from [facility] [place]    Exercise Vital Sign     Days of Exercise per Week: 3 days     Minutes of Exercise per Session: 120 min   Recent Concern: Physical Activity - Insufficiently Active (4/16/2024)    Received from [facility] [place]    Exercise Vital Sign     Days of Exercise per Week: 2 days     Minutes of Exercise per Session: 60 min   Stress: No Stress Concern Present (7/11/2024)    Received from [facility] - Occupational Stress Questionnaire     Feeling of Stress : Not at all   Housing Stability: Unknown (7/6/2024)    Housing Stability Vital Sign     Unable to Pay for Housing in the Last Year: No     MEDICATIONS:   Current Outpatient Medications:     AJOVY AUTOINJECTOR 225 mg/1.5 mL autoinjector, Inject into the skin., Disp: , Rfl:     carBAMazepine 100 mg/5 mL (5 mL) Susp, , Disp: , Rfl:     cetirizine (ZYRTEC) 10 MG tablet, , Disp: , Rfl:     cholecalciferol, vitamin D3, 125 mcg (5,000 unit) capsule, , Disp: , Rfl:     lamoTRIgine 100 mg TbDL, , Disp: , Rfl:     levETIRAcetam (KEPPRA) 750 MG Tab, Take 750 mg by mouth 2 (two) times daily., Disp: , Rfl:     magnesium 200 mg Tab, , Disp: , Rfl:     montelukast (SINGULAIR) 10 mg tablet, , Disp: , Rfl:     orphenadrine/aspirin/caffeine (NORGESIC FORTE ORAL), , Disp: , Rfl:     progesterone (PROMETRIUM) 100 MG capsule, Take 100 mg by mouth once daily., Disp: , Rfl:     rizatriptan (MAXALT) 10 MG tablet, Take by mouth., Disp: , Rfl:     ubrogepant (UBRELVY ORAL), Take by mouth., Disp: , Rfl:     vitamin B comp and C no.3 (B COMPLEX PLUS VITAMIN C) 15-10-50-5-300 mg Cap, , Disp: , Rfl:     memantine (NAMENDA) 10 MG Tab, Take by mouth., Disp: , Rfl:     ALLERGIES:   Review of patient's allergies  indicates:   Allergen Reactions    Nsaids (non-steroidal anti-inflammatory drug) Rash and Shortness Of Breath    Sumatriptan Other (See Comments)     Severe neck and back pain    Ibuprofen Other (See Comments)     Objective:     VITAL SIGNS: /66 (Patient Position: Sitting)   Pulse (!) 52    General    Vitals reviewed.  Constitutional: She is oriented to person, place, and time. She appears well-developed and well-nourished.   Neurological: She is alert and oriented to person, place, and time.   Psychiatric: She has a normal mood and affect. Her behavior is normal.           MUSCULOSKELETAL EXAM:  Cervical Spine: right cervical region     Observation:    Posture:  Upright  No obvious shoulder un-leveling while standing.    No edema, erythema, or ecchymosis noted in cervical and thoraic spine regions.    No midline skin abnormalities.    No atrophy of upper limb musculature.  Gait: Non-antalgic with Neutral ankle mechanics and Pes planus. Gait without trendelenberg, heel walking, toe walking, and tandem walking.     Tenderness:  No tenderness throughout the cervical spine upon spinous process palpation  No tenderness over the base of the occiput   No bony deformities or step-offs palpated.   No tenderness over the posterior paraspinals in cervical spine on right  No upper trapezius tenderness   No TMJ tenderness     Range of Motion (* = with pain):  CERVICAL SPINE  Full AROM in flexion, extension, sidebending, and rotation.     SHOULDER  Active flexion to 180° on left and 180° on right.  Active abduction to 180° on left and 180° on right.  Active internal rotation to T7 on left and T7 on right.    Active external rotation to T4 on left and T7 on right.    No scapular dyskinesia or winging.     Strength Testing (* = with pain):  Sternocleidomastoid - 5/5 on left and 5/5 on right  Upper trapezius-  5/5 on left and 5/5 on right  Deltoid - 5/5 on left and 5/5 on right  Biceps - 5/5 on left and 5/5 on right  Triceps  - 5/5 on left and 5/5 on right  Wrist extension - 5/5 on left and 5/5 on right  Wrist flexion - 5/5 on left and 5/5 on right   - 5/5 on left and 5/5 on right  Finger extension - 5/5 on left and 5/5 on right  Finger abduction - 5/5 on left and 5/5 on right     Structural Exam:  TART (Tissue texture abnormality, Asymmetry,  Restriction of motion and/or Tenderness) changes:     Head:   Cranial Rhythmic Impulse (CRI): restricted with Decreased amplitude    Strain Patterns: Left torsion  Occipitoatlantal (OA) Joint: neutral  Suture/Bone Restriction Side   Occipitomastoid (OM)  Absent     Parietal mastoid (PM) Absent     Petrojugular (PJ) Absent     Sphenosquamous pivot (SSP) absent    Zygomaticotemporal (ZT) Absent     Zygomaticofrontal (ZF) Absent     Frontonasal Absent     [place] bone Absent     Parietal bone Absent     Frontal bone Absent          Cervical Spine   C1 Neutral   C2 Neutral   C3 FRS LEFT   C4 Neutral   C5 Neutral   C6 Neutral   C7 ERS LEFT      Thoracic Spine   T1 Neutral   T2 Neutral   T3 Neutral   T4 Neutral   T5 Neutral   T6 Neutral   T7 FRS RIGHT   T8 Neutral   T9 Neutral   T10 Neutral   T11 Neutral   T12 Neutral     Rib cage: R1 inhaled on left, R7 external torsion on right    Upper extremity: left thoracic outlet TTA     Key   F= Flexed   E = Extended   R = Rotated   S = Sidebent   TTA = tissue texture abnormality      Neurovascular Exam:  Spurling's - negative on left and negative on right  Sensation intact to light touch in the C5-T1 dermatomes bilaterally.   Intact and symmetric radial pulses bilaterally.    Assessment:      Encounter Diagnoses   Name Primary?    TMJ (temporomandibular joint disorder) Yes    Cervicalgia     Short lasting unilateral neuralgiform headache with conjunctival injection and tearing (SUNCT), not intractable     Myalgia     Somatic dysfunction of head region     Cervical (neck) region somatic dysfunction     Somatic dysfunction of thoracic region     Somatic  dysfunction of rib cage region     Upper extremity somatic dysfunction           Plan:   1. Neck and jaw pain with underlying AIDA headaches as well as biomechanical restrictions of the upper kinetic chain and poor posture- improved.   - OMT performed again today to help with symptom relief.   - Continue proper posture and work desk and desk accommodations to facilitate good posture.  - continue fPT for jaw pain  - Continue follow up with Dr. Yeh in pain management as needed  - currently followed by Dr. Gabriela BARTLETT at [facility]""  for TMJ  - X-ray images of cervical spine taken 5/2/24 (AP, lateral, bilateral obliques, and odontoid  flexion and extension views) showed No acute osseous abnormality seen. Straightening of the normal cervical lordosis which may be positional or related to muscle spasm.      2. OMT 5-6 regions. Oral consent obtained.  Reviewed benefits and potential side effects.   - OMT indicated today due to signs and symptoms as well as local and remote somatic dysfunction findings and their related neurokinetic, lymphatic, fascial and/or arteriovenous body connections.   - OMT techniques used: Myofascial Release, Muscle Energy, Still's Technique, and Cranial    - Treatment was tolerated well. Improvement noted in segmental mobility post-treatment in dysfunctional regions. There were no adverse events and no complications immediately following treatment.      3. Pt. Continue the following HEP:  A)  Supine Thoracic extension exercise: 10-15 reps, twice daily. Handout also given.   B) continue HEP from fPT     The patient was taught a homegoing physical therapy regimen as described above. The patient demonstrated understanding of the exercises and proper technique of their execution.      4. Follow-up once new bite guard obtained for reevaluation      5. Patient agreeable to today's plan and all questions were answered     This note is dictated using the M*Modal Fluency Direct word recognition program.  There are word recognition mistakes that are occasionally missed on review.

## 2024-11-21 ENCOUNTER — CLINICAL SUPPORT (OUTPATIENT)
Dept: REHABILITATION | Facility: HOSPITAL | Age: 34
End: 2024-11-21
Payer: COMMERCIAL

## 2024-11-21 DIAGNOSIS — R29.898 DECREASED STRENGTH OF LOWER EXTREMITY: Primary | ICD-10-CM

## 2024-11-21 PROCEDURE — 97140 MANUAL THERAPY 1/> REGIONS: CPT

## 2024-11-21 PROCEDURE — 97112 NEUROMUSCULAR REEDUCATION: CPT

## 2024-11-21 PROCEDURE — 97110 THERAPEUTIC EXERCISES: CPT

## 2024-11-21 NOTE — PROGRESS NOTES
[facility]   Physical Therapy Treatment Note     Name: Maricel Jah  Clinic Number: [id number]    Therapy Diagnosis:   Encounter Diagnosis   Name Primary?    Decreased strength of lower extremity Yes     Physician: Divina Patel DO    Visit Date: 11/21/2024  Physician Orders: PT Eval and Treat   Medical Diagnosis from Referral: Strain of right gastrocnemius muscle, sequela [S86.111S]   Evaluation Date: 10/15/2024  Authorization Period Expiration: 12/31/2024  Plan of Care Expiration: 12/31/2024  Progress Note Due: 11/20/2024  Visit # / Visits authorized: 4/20  FOTO: 2/3    PTA Visit #: 0/5     FOTO first follow up: 11/21/2024  FOTO second follow up:     Time In: 1:08 pm  Time Out: 2:03 pm  Total Billable Time: 55 minutes    SUBJECTIVE   Pt reports: she has been doing well since last visit. She hasn't had any of her calf pain, but has started having pain over the front of her ankle and sharp pain in her arch. She has been playing avox ball and ramping it up again with no problems.  She was compliant with home exercise program.  Response to previous treatment: Independent with HEP  Functional change: on going     Pain: 2/10  Location: Anterior ankle and medial arch of right foot     OBJECTIVE     Objective Measures updated at progress report unless specified.     Observation 11/21/2024:    Range of Motion:  Ankle   Right AROM/PROM   Dorsiflexion 50 degrees / 55 degrees    Plantarflexion  0 degrees / 5 degrees    Inversion 30 degrees / 35 degrees    Eversion  12 degrees / 15 degrees      Joint Mobility: hypomobile AP joint mobility in talocrural joint, limited subtalar joint mobility into eversion and decreased navicular mobility on right ankle.     FOTO = 73 (initial = 69)    Treatment     Maricel received the treatments listed below:      therapeutic exercises to develop strength, endurance, ROM, flexibility, posture, and core stabilization for 23 minutes including:    Pt education on  "findings and updated HEP   Assessment as above   Self DF mobilizations standing on 2-inch with OrangePowerband 1 x 15 x 3" holds  Step downs 3-inch step with PT assist for arch support and glide 1 x 10 reps   Sidelying clamshells GreenTB 2 x 10 reps each   Sidelying hip Er 2 x 10 x 3" holds     Not performed:  Long sitting calf stretch with strap   Modified lunge 6" box red band AP talocrural mob 2 x 10 x  5" holds  Seated soleus raises 10# DBs 3 x 15 reps  Box squats 20in box 3 x 10 reps  (B) Sidelying hip Abduction 3 x 10 reps ea  (B) Seated hip ER RedTB 2 x 12 reps       manual therapy techniques: Joint mobilizations, Myofacial release, and Soft tissue Mobilization were applied to the: ankle for 18 minutes, including:    Ankle ROM assessment as above   AP talocrural mobs grade III-IV  Subtalar mobilizations grade II-III  Navicular posterior mobilizations   Talocrural distraction manipulation    Not Performed:   Subtalar rocking  (R) Long axis hip distraction manipulation grade V    neuromuscular re-education activities to improve: Balance, Coordination, Kinesthetic, Sense, Proprioception, and Posture for 14 minutes. The following activities were included:    DL heel raises 3 x 12 reps  -therapist assist for mechanics   Paloff press with FHL yellowTB 2 x 10 reps each     Not Performed:  FHL big toe flexion with manual resistance from therapist  - eccentric focus  Short arches in sitting 2 x 10 reps x 5" holds  Slump sliders 2 x 15 reps   Bridge with GreenTB and short arches 2 x 10 x 3-5" holds   DL heel raise with ball between heels 3 x 15 reps  FHL with manual resistance and YellowTB 2 x 10 reps       therapeutic activities to improve functional performance for 00 minutes, including:    Not performed:  Sled Pulls 50# 2 laps       Patient Education and Home Exercises     Home Exercises Provided and Patient Education Provided     Education provided:   - HEP, Prognosis, PT POC    Written Home Exercises Provided: " Patient instructed to cont prior HEP. Exercises were reviewed and Maricel was able to demonstrate them prior to the end of the session.  Maricel demonstrated good  understanding of the education provided. See EMR under Patient Instructions for exercises provided during therapy sessions    ASSESSMENT     Maricel presents to today's therapy session with reports of resolution of calf pain and new onset anterior ankle and arch pain. She reported increased pain with plantarflexion and tenderness to palpation anterior ankle. She responded well to manual intervention improving navicular and subtalar joint mobility. Continued foot intrinsic strengthening and progressing with calf raises with cueing to improve foot mechanics. Continued hip strengthening as well to help offload her foot and knees and improve her overall lower extremity mechanics and improve foot control. She was educated on exercises to continue to help with symptoms and will continue to monitor and progress as able.     Maricel Is progressing well towards her goals.   Pt prognosis is Good.     Pt will continue to benefit from skilled outpatient physical therapy to address the deficits listed in the problem list box on initial evaluation, provide pt/family education and to maximize pt's level of independence in the home and community environment.     Pt's spiritual, cultural and educational needs considered and pt agreeable to plan of care and goals.     Anticipated barriers to physical therapy: Scheduling     Goals: Short Term Goals: 4 weeks (NOT MET: in progress)  Patient will be independent in initial HEP to help supplement PT. - MET  Patient will improve ankle dorsiflexion range of motion to 5 degrees to help with gait mechanics. - MET PROM  Patient will be able to perform a squat pain free to help with getting up and down at work.      Long Term Goals: 8-10 weeks (NOT MET: in progress)  Patient will be independent in updated HEP to help supplement PT and  maintain gains made in PT.  Patient will improve FOTO score to >/= predicted to help supplement PT.   Patient will be able to perform >/= 15 single leg heel raises to help with return to jogging.   Patient goal: Patient will be able to play pickleball without pain.     PLAN     Plan of care Certification: 10/15/2024 to 12/31/2024.     Outpatient Physical Therapy 1-2 times weekly for 10 weeks to include the following interventions: Electrical Stimulation , Gait Training, Manual Therapy, Moist Heat/ Ice, Neuromuscular Re-ed, Patient Education, Self Care, Therapeutic Activities, and Therapeutic Exercise.     Davin Fernandez, SPT  Co-treated by: Nelli De Los Santos, PT, DPT, OCS     I certify that I was present in the room directing the student in service delivery and guiding them using my skilled judgment. As the co-signing therapist I have reviewed the students documentation and am responsible for the treatment, assessment, and plan.

## 2024-11-25 RX ORDER — DEXAMETHASONE SODIUM PHOSPHATE 4 MG/ML
4 INJECTION, SOLUTION INTRA-ARTICULAR; INTRALESIONAL; INTRAMUSCULAR; INTRAVENOUS; SOFT TISSUE
Status: COMPLETED | OUTPATIENT
Start: 2024-11-25 | End: 2024-11-25

## 2024-11-25 RX ADMIN — DEXAMETHASONE SODIUM PHOSPHATE 4 MG: 4 INJECTION, SOLUTION INTRA-ARTICULAR; INTRALESIONAL; INTRAMUSCULAR; INTRAVENOUS; SOFT TISSUE at 03:11

## 2024-12-02 ENCOUNTER — TELEPHONE (OUTPATIENT)
Dept: SPORTS MEDICINE | Facility: CLINIC | Age: 34
End: 2024-12-02
Payer: COMMERCIAL

## 2024-12-02 NOTE — TELEPHONE ENCOUNTER
Spoke with patient and informed her that the appointment on 12/2 will have to be rescheduled due to Dr. Patel being out of clinic. Patient understood and stated that she would book her own follow-up through the portal.

## 2024-12-09 ENCOUNTER — OFFICE VISIT (OUTPATIENT)
Dept: SPORTS MEDICINE | Facility: CLINIC | Age: 34
End: 2024-12-09
Payer: COMMERCIAL

## 2024-12-09 VITALS — HEART RATE: 81 BPM | DIASTOLIC BLOOD PRESSURE: 71 MMHG | SYSTOLIC BLOOD PRESSURE: 118 MMHG

## 2024-12-09 DIAGNOSIS — M99.08 SOMATIC DYSFUNCTION OF RIB CAGE REGION: ICD-10-CM

## 2024-12-09 DIAGNOSIS — M26.609 TMJ (TEMPOROMANDIBULAR JOINT DISORDER): Primary | ICD-10-CM

## 2024-12-09 DIAGNOSIS — M99.07 UPPER EXTREMITY SOMATIC DYSFUNCTION: ICD-10-CM

## 2024-12-09 DIAGNOSIS — G44.059 SHORT LASTING UNILATERAL NEURALGIFORM HEADACHE WITH CONJUNCTIVAL INJECTION AND TEARING (SUNCT), NOT INTRACTABLE: ICD-10-CM

## 2024-12-09 DIAGNOSIS — M54.2 CERVICALGIA: ICD-10-CM

## 2024-12-09 DIAGNOSIS — M99.01 CERVICAL (NECK) REGION SOMATIC DYSFUNCTION: ICD-10-CM

## 2024-12-09 DIAGNOSIS — M79.10 MYALGIA: ICD-10-CM

## 2024-12-09 DIAGNOSIS — M99.00 SOMATIC DYSFUNCTION OF HEAD REGION: ICD-10-CM

## 2024-12-09 DIAGNOSIS — M99.02 SOMATIC DYSFUNCTION OF THORACIC REGION: ICD-10-CM

## 2024-12-09 PROCEDURE — 99999 PR PBB SHADOW E&M-EST. PATIENT-LVL III: CPT | Mod: PBBFAC,,, | Performed by: NEUROMUSCULOSKELETAL MEDICINE & OMM

## 2024-12-09 PROCEDURE — 99213 OFFICE O/P EST LOW 20 MIN: CPT | Mod: 25,S$GLB,, | Performed by: NEUROMUSCULOSKELETAL MEDICINE & OMM

## 2024-12-09 PROCEDURE — 1159F MED LIST DOCD IN RCRD: CPT | Mod: CPTII,S$GLB,, | Performed by: NEUROMUSCULOSKELETAL MEDICINE & OMM

## 2024-12-09 PROCEDURE — 3074F SYST BP LT 130 MM HG: CPT | Mod: CPTII,S$GLB,, | Performed by: NEUROMUSCULOSKELETAL MEDICINE & OMM

## 2024-12-09 PROCEDURE — 98927 OSTEOPATH MANJ 5-6 REGIONS: CPT | Mod: S$GLB,,, | Performed by: NEUROMUSCULOSKELETAL MEDICINE & OMM

## 2024-12-09 PROCEDURE — 1160F RVW MEDS BY RX/DR IN RCRD: CPT | Mod: CPTII,S$GLB,, | Performed by: NEUROMUSCULOSKELETAL MEDICINE & OMM

## 2024-12-09 PROCEDURE — 3078F DIAST BP <80 MM HG: CPT | Mod: CPTII,S$GLB,, | Performed by: NEUROMUSCULOSKELETAL MEDICINE & OMM

## 2024-12-09 NOTE — PROGRESS NOTES
Subjective:     Maricel Jha    Chief Complaint   Patient presents with    Follow-up     Headaches, jaw      HPI    Maricel is a 34 y.o. female with PMHx of AIDA coming in today for follow up for right-sided headache/neck pain. Since last visit the pain has remained unchanged. Pt reports the last round of nerve blocks didn't help as much and her jaw is tight as she adjusts to the . The pain is better with stretching, prior OMT type treatments and worse with lack of sleep, stress. Pt. describes the pain as a 7/10 currently.      Office note from 11/20/24 reviewed    Procedures reviewed:   6/11/24- Bilateral OCCIPITAL NERVE BLOCK UNDER U/S  Dr. Yeh  7/2/24- Right OCCIPITAL NERVE BLOCK  Dr. Yeh  9/3/24- Right Greater and lesser OCCIPITAL NERVE BLOCK via anatomic guidance Dr. Yeh  11/19/24- Bilateral Greater and lesser OCCIPITAL NERVE BLOCK via anatomic guidance - Dr. Yeh    PAST MEDICAL HISTORY: History reviewed. No pertinent past medical history.  PAST SURGICAL HISTORY: History reviewed. No pertinent surgical history.  FAMILY HISTORY: No family history on file.    SOCIAL HISTORY:   Social History     Socioeconomic History    Marital status:    Tobacco Use    Smoking status: Never    Smokeless tobacco: Never     Social Drivers of Health     Financial Resource Strain: Low Risk  (7/11/2024)    Received from [facility]    Overall Financial Resource Strain (CARDIA)     Difficulty of Paying Living Expenses: Not hard at all   Food Insecurity: No Food Insecurity (7/11/2024)    Received from [facility]    Hunger Vital Sign     Worried About Running Out of Food in the Last Year: Never true     Ran Out of Food in the Last Year: Never true   Transportation Needs: No Transportation Needs (7/11/2024)    Received from [facility]    PRAPARE - Transportation     Lack of Transportation (Medical): No     Lack of Transportation (Non-Medical): No   Physical Activity: Sufficiently Active  (7/11/2024)    Received from [facility]    Exercise Vital Sign     Days of Exercise per Week: 3 days     Minutes of Exercise per Session: 120 min   Recent Concern: Physical Activity - Insufficiently Active (4/16/2024)    Received from [facility]    Exercise Vital Sign     Days of Exercise per Week: 2 days     Minutes of Exercise per Session: 60 min   Stress: No Stress Concern Present (7/11/2024)    Received from [facility]    [employer] [place] [employer] - Occupational Stress Questionnaire     Feeling of Stress : Not at all   Housing Stability: Unknown (7/6/2024)    Housing Stability Vital Sign     Unable to Pay for Housing in the Last Year: No     MEDICATIONS:   Current Outpatient Medications:     AJOVY AUTOINJECTOR 225 mg/1.5 mL autoinjector, Inject into the skin., Disp: , Rfl:     carBAMazepine 100 mg/5 mL (5 mL) Susp, , Disp: , Rfl:     cetirizine (ZYRTEC) 10 MG tablet, , Disp: , Rfl:     cholecalciferol, vitamin D3, 125 mcg (5,000 unit) capsule, , Disp: , Rfl:     lamoTRIgine 100 mg TbDL, , Disp: , Rfl:     levETIRAcetam (KEPPRA) 750 MG Tab, Take 750 mg by mouth 2 (two) times daily., Disp: , Rfl:     magnesium 200 mg Tab, , Disp: , Rfl:     montelukast (SINGULAIR) 10 mg tablet, , Disp: , Rfl:     orphenadrine/aspirin/caffeine (NORGESIC FORTE ORAL), , Disp: , Rfl:     progesterone (PROMETRIUM) 100 MG capsule, Take 100 mg by mouth once daily., Disp: , Rfl:     rizatriptan (MAXALT) 10 MG tablet, Take by mouth., Disp: , Rfl:     ubrogepant (UBRELVY ORAL), Take by mouth., Disp: , Rfl:     vitamin B comp and C no.3 (B COMPLEX PLUS VITAMIN C) 15-10-50-5-300 mg Cap, , Disp: , Rfl:     ALLERGIES:   Review of patient's allergies indicates:   Allergen Reactions    Nsaids (non-steroidal anti-inflammatory drug) Rash and Shortness Of Breath    Sumatriptan Other (See Comments)     Severe neck and back pain    Ibuprofen Other (See Comments)     Objective:     VITAL SIGNS: /71 (Patient Position: Sitting)    Pulse 81    General    Vitals reviewed.  Constitutional: She is oriented to person, place, and time. She appears well-developed and well-nourished.   Neurological: She is alert and oriented to person, place, and time.   Psychiatric: She has a normal mood and affect. Her behavior is normal.           MUSCULOSKELETAL EXAM:  Cervical Spine: right cervical region     Observation:    Posture:  Upright  No obvious shoulder un-leveling while standing.    No edema, erythema, or ecchymosis noted in cervical and thoraic spine regions.    No midline skin abnormalities.    No atrophy of upper limb musculature.  Gait: Non-antalgic with Neutral ankle mechanics and Pes planus. Gait without trendelenberg, heel walking, toe walking, and tandem walking.     Tenderness:  No tenderness throughout the cervical spine upon spinous process palpation  No tenderness over the base of the occiput   No bony deformities or step-offs palpated.   No tenderness over the posterior paraspinals in cervical spine on right  No upper trapezius tenderness   + left TMJ tenderness     Range of Motion (* = with pain):  CERVICAL SPINE  Full AROM in flexion, extension, sidebending, and rotation.      Structural Exam:  TART (Tissue texture abnormality, Asymmetry,  Restriction of motion and/or Tenderness) changes:     Head:   Cranial Rhythmic Impulse (CRI): restricted with Decreased amplitude, decreased with jaw occlusion, but less with bite guard    Strain Patterns: SBS compression  Occipitoatlantal (OA) Joint: ES-left, R-right  Suture/Bone Restriction Side   Occipitomastoid (OM)  Present L   Parietal mastoid (PM) Absent    Petrojugular (PJ) Absent    Sphenosquamous pivot (SSP) Present L   Zygomaticotemporal (ZT) Present R   Zygomaticofrontal (ZF) Absent    Frontonasal Absent    [place] bone Present L   Parietal bone Absent    Frontal bone Absent      Muscle Tissue Texture Abnormality Side   Lateral pterygoid Present L   Medial pterygoid Present L         Cervical Spine   C1 TTA RIGHT   C2 Rotated RIGHT   C3 FRS LEFT   C4 Neutral   C5 Neutral   C6 Neutral   C7 ERS LEFT      Thoracic Spine   T1 Neutral   T2 Neutral   T3 Neutral   T4 Neutral   T5 Neutral   T6 Neutral   T7 FRS RIGHT   T8 FRS RIGHT   T9 Neutral   T10 Neutral   T11 Neutral   T12 Neutral     Rib cage: R1 inhaled on left, R7 external torsion on right    Upper extremity: left thoracic outlet TTA     Key   F= Flexed   E = Extended   R = Rotated   S = Sidebent   TTA = tissue texture abnormality      Neurovascular Exam:  Spurling's - negative on left and negative on right  Sensation intact to light touch in the C5-T1 dermatomes bilaterally.   Intact and symmetric radial pulses bilaterally.    Assessment:      Encounter Diagnoses   Name Primary?    TMJ (temporomandibular joint disorder) Yes    Cervicalgia     Short lasting unilateral neuralgiform headache with conjunctival injection and tearing (SUNCT), not intractable     Myalgia     Somatic dysfunction of head region     Somatic dysfunction of thoracic region     Somatic dysfunction of rib cage region     Cervical (neck) region somatic dysfunction     Upper extremity somatic dysfunction         Plan:   1. Neck and jaw pain with underlying AIDA headaches as well as biomechanical restrictions of the upper kinetic chain and poor posture- deteriorated with recent bite guard fitting  - OMT performed again today   - Continue proper posture and work desk and desk accommodations to facilitate good posture.  - continue fPT for jaw pain  - Continue follow up with Dr. Yeh in pain management as needed  - currently followed by Dr. Gabriela BARTLETT at [facility]  for TMJ, with bite guard adjustment scheduled for 12/16/2024  - X-ray images of cervical spine taken 5/2/24 (AP, lateral, bilateral obliques, and odontoid  flexion and extension views) showed No acute osseous abnormality seen. Straightening of the normal cervical lordosis which may be positional or related to muscle  spasm.      2. OMT 5-6 regions. Oral consent obtained.  Reviewed benefits and potential side effects.   - OMT indicated today due to signs and symptoms as well as local and remote somatic dysfunction findings and their related neurokinetic, lymphatic, fascial and/or arteriovenous body connections.   - OMT techniques used: Myofascial Release, Muscle Energy, Still's Technique, and Cranial    - Treatment was tolerated well. Improvement noted in segmental mobility post-treatment in dysfunctional regions. There were no adverse events and no complications immediately following treatment.      3.  Follow-up as needed if symptoms persist following upcoming bite guard adjustment     4. Patient agreeable to today's plan and all questions were answered     This note is dictated using the M*Modal Fluency Direct word recognition program. There are word recognition mistakes that are occasionally missed on review.

## 2024-12-19 ENCOUNTER — OFFICE VISIT (OUTPATIENT)
Dept: SPORTS MEDICINE | Facility: CLINIC | Age: 34
End: 2024-12-19
Payer: COMMERCIAL

## 2024-12-19 ENCOUNTER — OFFICE VISIT (OUTPATIENT)
Dept: SPINE | Facility: CLINIC | Age: 34
End: 2024-12-19
Payer: COMMERCIAL

## 2024-12-19 ENCOUNTER — HOSPITAL ENCOUNTER (OUTPATIENT)
Dept: RADIOLOGY | Facility: HOSPITAL | Age: 34
Discharge: HOME | End: 2024-12-19
Attending: NEUROMUSCULOSKELETAL MEDICINE & OMM
Payer: COMMERCIAL

## 2024-12-19 ENCOUNTER — TELEPHONE (OUTPATIENT)
Dept: PAIN MEDICINE | Facility: CLINIC | Age: 34
End: 2024-12-19
Payer: COMMERCIAL

## 2024-12-19 VITALS
WEIGHT: 191.81 LBS | DIASTOLIC BLOOD PRESSURE: 53 MMHG | OXYGEN SATURATION: 99 % | TEMPERATURE: 98 F | BODY MASS INDEX: 30.96 KG/M2 | SYSTOLIC BLOOD PRESSURE: 107 MMHG | HEART RATE: 57 BPM

## 2024-12-19 VITALS — DIASTOLIC BLOOD PRESSURE: 65 MMHG | SYSTOLIC BLOOD PRESSURE: 106 MMHG | HEART RATE: 61 BPM

## 2024-12-19 DIAGNOSIS — M79.672 LEFT FOOT PAIN: ICD-10-CM

## 2024-12-19 DIAGNOSIS — M79.672 LEFT FOOT PAIN: Primary | ICD-10-CM

## 2024-12-19 DIAGNOSIS — G44.059 SHORT LASTING UNILATERAL NEURALGIFORM HEADACHE WITH CONJUNCTIVAL INJECTION AND TEARING (SUNCT), NOT INTRACTABLE: Primary | ICD-10-CM

## 2024-12-19 DIAGNOSIS — M79.10 MYALGIA: ICD-10-CM

## 2024-12-19 DIAGNOSIS — M77.42 METATARSALGIA, LEFT FOOT: ICD-10-CM

## 2024-12-19 DIAGNOSIS — Q66.71 PES CAVUS OF BOTH FEET: ICD-10-CM

## 2024-12-19 DIAGNOSIS — M54.81 OCCIPITAL NEURALGIA OF RIGHT SIDE: ICD-10-CM

## 2024-12-19 DIAGNOSIS — Q66.72 PES CAVUS OF BOTH FEET: ICD-10-CM

## 2024-12-19 DIAGNOSIS — M54.2 CERVICALGIA: ICD-10-CM

## 2024-12-19 DIAGNOSIS — M99.06 SOMATIC DYSFUNCTION OF LOWER EXTREMITY: ICD-10-CM

## 2024-12-19 DIAGNOSIS — M79.672 ACUTE PAIN OF LEFT FOOT: Primary | ICD-10-CM

## 2024-12-19 DIAGNOSIS — M54.81 OCCIPITAL NEURALGIA OF RIGHT SIDE: Primary | ICD-10-CM

## 2024-12-19 PROCEDURE — 3078F DIAST BP <80 MM HG: CPT | Mod: CPTII,S$GLB,, | Performed by: ANESTHESIOLOGY

## 2024-12-19 PROCEDURE — 1159F MED LIST DOCD IN RCRD: CPT | Mod: CPTII,S$GLB,, | Performed by: ANESTHESIOLOGY

## 2024-12-19 PROCEDURE — 99214 OFFICE O/P EST MOD 30 MIN: CPT | Mod: S$GLB,,, | Performed by: ANESTHESIOLOGY

## 2024-12-19 PROCEDURE — 99999 PR PBB SHADOW E&M-EST. PATIENT-LVL IV: CPT | Mod: PBBFAC,,, | Performed by: ANESTHESIOLOGY

## 2024-12-19 PROCEDURE — 3044F HG A1C LEVEL LT 7.0%: CPT | Mod: CPTII,S$GLB,, | Performed by: ANESTHESIOLOGY

## 2024-12-19 PROCEDURE — 3074F SYST BP LT 130 MM HG: CPT | Mod: CPTII,S$GLB,, | Performed by: ANESTHESIOLOGY

## 2024-12-19 PROCEDURE — 1160F RVW MEDS BY RX/DR IN RCRD: CPT | Mod: CPTII,S$GLB,, | Performed by: ANESTHESIOLOGY

## 2024-12-19 PROCEDURE — 73630 X-RAY EXAM OF FOOT: CPT | Mod: 26,LT,, | Performed by: INTERNAL MEDICINE

## 2024-12-19 PROCEDURE — 3008F BODY MASS INDEX DOCD: CPT | Mod: CPTII,S$GLB,, | Performed by: ANESTHESIOLOGY

## 2024-12-19 PROCEDURE — 99999 PR PBB SHADOW E&M-EST. PATIENT-LVL III: CPT | Mod: PBBFAC,,, | Performed by: NEUROMUSCULOSKELETAL MEDICINE & OMM

## 2024-12-19 PROCEDURE — 73630 X-RAY EXAM OF FOOT: CPT | Mod: TC,PO,LT

## 2024-12-19 NOTE — PROGRESS NOTES
Established Visit    PCP: Jackie Valenzuela MD      CHIEF COMPLAINT: Headaches    Original HISTORY OF PRESENT ILLNESS: Maricel Jha presents to the clinic for the evaluation of the above pain. The pain started 3 years ago.     Original Pain Description:  The patient is a 34 year old female with pmh of AIDA who presents for evaluation and treatment of headaches. The headaches started 3 years ago with no inciting factor. The pain is right sided and is located in the upper neck/base of the skull and spread towards the right side of the face. Pain in the back is described as a soreness, pain in the front is more of a stabbing and searing pain.  Often she will get episodes of tearing and facial flushing. Exacerbating factors: Stress, wind, brushing teeth, brushing hair. Mitigating factors: medication. Symptoms interfere with work and quality of life. The patient feels like symptoms have been unchanged. No photophobia, nausea, vomiting, auras.     She recently moved from [place] to [place]. She see's a neurologist in [place] who researches AIDA and is happy with her care there. Her neurologist over there manages her medications. She gets cervical trigger point injections and greater occipital nerve blocks about every 6-8 weeks. These give her about 75% relief for 4 weeks. Steroids are added about every other injection. She has had 6 injections so far, last one was earlier this week. Overall, she is happy with her treatment and wants to continue as is for now.     Original PAIN SCORES:  Best: Pain is 2  Worst: Pain is 9  Current: Pain is 4        12/19/2024     9:29 AM   Last 3 PDI Scores   Pain Disability Index (PDI) 49     INTERVAL HISTORY: (Newest visit at the bottom)   Interval History (7/1/2024):   34 year old female returns in follow up for occipital neuralgia. Patient had bilateral occipital nerve blocks on 6/17/2024. Patient reports no benefit. She  localizes pain to the right occipital prominence. Typically got good benefit with landmark guided procedure. No benefit with US guided procedure. Patient still follows with her [place] [facility]rologist via virtual visits.  Patient denies recent falls, trauma, hospitalizations, or infections.     Interval History 8/8/2024:   Patient following up for occipital neuralgia. Patient said occipital nerve block every 6 weeks usually helps and would like to be scheduled to have the procedure done. Her scheduled right sphenopalatine block procedure was cancelled due to high co pays. Patient said her headaches and neck pain is not controlled with her current medication regimen. Her neurologist added on memantine to her current medication. Her pain today is 2/10. Denied fevers, vomiting, visual changes or difficulties with balance or ambulation.     Interval History 12/20/2024  Patient with hx of AIDA follow up after recent bilateral ONB under US with steroids.  She reports limited improvement after this injection, last only a few days.  In contrast, previously she received 6-8 weeks of pain relief.  Recently started on Keppra.  Takes Norgesic PRN for pain.  Denies vision changes.  Pain today 7/10, on right side.    6 weeks of Conservative therapy:  PT: n/a   HEP: n/a    Treatments / Medications: (Ice/Heat/NSAIDS/APAP/etc):  Carbamazepine  Ajovy  Maxalt  Ubrelvy  Lamotrigine  Memantin     Interventional Pain Procedures:   Right Occipital nerve blocks       No past medical history on file.  No past surgical history on file.  Social History     Socioeconomic History    Marital status:    Tobacco Use    Smoking status: Never    Smokeless tobacco: Never     Social Drivers of Health     Financial Resource Strain: Low Risk  (7/11/2024)    Received from [facility]    Overall Financial Resource Strain (CARDIA)     Difficulty of Paying Living Expenses: Not hard at all   Food Insecurity: No Food Insecurity (7/11/2024)    Received from  [facility]    Hunger Vital Sign     Worried About Running Out of Food in the Last Year: Never true     Ran Out of Food in the Last Year: Never true   Transportation Needs: No Transportation Needs (7/11/2024)    Received from [facility]    PRAPARE - Transportation     Lack of Transportation (Medical): No     Lack of Transportation (Non-Medical): No   Physical Activity: Sufficiently Active (7/11/2024)    Received from [facility]    Exercise Vital Sign     Days of Exercise per Week: 3 days     Minutes of Exercise per Session: 120 min   Recent Concern: Physical Activity - Insufficiently Active (4/16/2024)    Received from [facility]    Exercise Vital Sign     Days of Exercise per Week: 2 days     Minutes of Exercise per Session: 60 min   Stress: No Stress Concern Present (7/11/2024)    Received from [facility]    [place] [employer] - Occupational Stress Questionnaire     Feeling of Stress : Not at all   Housing Stability: Unknown (7/6/2024)    Housing Stability Vital Sign     Unable to Pay for Housing in the Last Year: No     No family history on file.    Review of patient's allergies indicates:   Allergen Reactions    Nsaids (non-steroidal anti-inflammatory drug) Rash and Shortness Of Breath    Sumatriptan Other (See Comments)     Severe neck and back pain    Ibuprofen Other (See Comments)       Current Outpatient Medications   Medication Sig    AJOVY AUTOINJECTOR 225 mg/1.5 mL autoinjector Inject into the skin.    carBAMazepine 100 mg/5 mL (5 mL) Susp     cetirizine (ZYRTEC) 10 MG tablet     cholecalciferol, vitamin D3, 125 mcg (5,000 unit) capsule     lamoTRIgine 100 mg TbDL     levETIRAcetam (KEPPRA) 750 MG Tab Take 750 mg by mouth 2 (two) times daily.    magnesium 200 mg Tab     montelukast (SINGULAIR) 10 mg tablet     orphenadrine/aspirin/caffeine (NORGESIC FORTE ORAL)     progesterone (PROMETRIUM) 100 MG capsule Take 100 mg by mouth once daily.    rizatriptan (MAXALT) 10 MG tablet Take  by mouth.    ubrogepant (UBRELVY ORAL) Take by mouth.    vitamin B comp and C no.3 (B COMPLEX PLUS VITAMIN C) 15-10-50-5-300 mg Cap      No current facility-administered medications for this visit.     ROS:  GENERAL: No fever. No chills. No fatigue.   HEENT: + tearing, denies vision changes  CV: Denies chest pain.   PULM: Denies of shortness of breath.  GI: Denies constipation. No diarrhea. No abdominal pain. Denies nausea. Denies vomiting. No blood in stool.  HEME: Denies bleeding problems.  : Denies urgency. No painful urination. No blood in urine.  MS: + neck pain  SKIN: Denies rash.   NEURO: + headaches, no weakness  PSYCH:  Denies difficulty sleeping. No anxiety. Denies depression. No suicidal thoughts.     VITALS:   Vitals:    12/19/24 0929   BP: (!) 107/53   Pulse: (!) 57   Temp: 97.7 °F (36.5 °C)   SpO2: 99%   Weight: 87 kg (191 lb 12.8 oz)   PainSc:   7   PainLoc: Head     PHYSICAL EXAM:   GENERAL: Well appearing, in no acute distress, alert and oriented x3.  PSYCH:  Mood and affect appropriate.  SKIN: Skin color, texture, turgor normal, no rashes or lesions.  HEENT:  Normocephalic, atraumatic. Cranial nerves grossly intact. Mild tenderness to palpation over the cervical paraspinal muscles and occipital ridge bilaterally.   Full ROM with flexion, extension, lateral rotation. Lhermitte's and Spurling's are negative  PULM: No evidence of respiratory difficulty, symmetric chest rise.  GI:  Non-distended  EXTREMITIES: No deformities, edema, or skin discoloration.   NEURO: CN II-XII are intact. Sensation is equal and appropriate bilaterally. Bilateral upper and lower extremity strength is normal and symmetric. Bilateral upper and lower extremity coordination and muscle stretch reflexes are physiologic and symmetric. Plantar response are downgoing. Negative Hoffmans.No clonus  GAIT: normal.    IMAGING:    EXAM: Brain w/wo contrast MRI     DATE: 5/29/2021 11:00     INDICATION:  - concern for brainstem  MS/infalmmatory/demyelinating condition, patient with trigeminal pain/redness x4 weeks. please thin cuts brain stem.     COMPARISON: CTA 5/29/2021     TECHNIQUE: Multiplanar multisequence images of the brain were obtained before and after the intravenous administration of contrast material.     DISCUSSION:     No acute parenchymal abnormality.   No hydrocephalus, midline shift, mass effect, restricted diffusion or acute hemorrhage.   Pineal region, pituitary gland, cranio cervical junction, orbits and internal auditory canals are unremarkable.   Major intracranial flow-voids are well-maintained.   No mass or abnormal enhancement. The cisternal segments of the trigeminal nerves are unremarkable with no abnormal enhancement.   No osseous lesions.     IMPRESSION:     Unremarkable MRI of the brain with and without contrast.     ASSESSMENT: 34 y.o. year old female with pain, consistent with:    Encounter Diagnosis   Name Primary?    short-lasting unilateral neuralgiform headache attacks with cranial autonomic symptoms (AIDA), not intractable Yes       DISCUSSION: Maricel Jha is a 34 y.o. year old who comes to us with AIDA and occipital neuralgia. She has received significant relief with her medications and cervical TPI and occipital nerve blocks and is happy with her current treatment strategies    PLAN:  Schedule for repeat Right occipital nerve block and TPI in the office  Follow up with Neurology    Bianca Aguayo MD  [facility] Pain Fellow      I spent a total of 30 minutes on the day of the visit.  This includes face to face time and non-face to face time preparing to see the patient by reviewing previous labs/imaging, obtaining and/or reviewing separately obtained history, documenting clinical information in the electronic or other health record, independently interpreting results and communicating results to the patient/family/caregiver.    Osman Yeh

## 2024-12-19 NOTE — PROGRESS NOTES
Subjective:     Maricel Jha     Chief Complaint   Patient presents with    Left Foot - Pain     HPI    Maricel is a 34 y.o. female coming in today for left foot pain that began about 2 week(s) ago. Pt. describes the pain as a 7/10 achy pain that does not radiate. Patient localizes her pain to the plantar metatarsal arch. There was not a fall/injury/ or trauma associated with the onset of symptoms. Pt reports she has been playing a lot of pickleball. She reports no pain when she is playing sports but significant sharp pain with resting and at night. The pain is better with tylenol and worse with rest, sitting, night time. Pt notes she did change her court shoes 1 month ago- New [employer] shoes with good support and wide toe box. Pt. Denies any other musculoskeletal complaints at this time.     Joint instability? no  Mechanical locking/clicking? no  Affecting ADL's? yes  Affecting sleep? yes    PAST MEDICAL HISTORY: History reviewed. No pertinent past medical history.  PAST SURGICAL HISTORY: History reviewed. No pertinent surgical history.  FAMILY HISTORY: No family history on file.  SOCIAL HISTORY:   Social History     Socioeconomic History    Marital status:    Tobacco Use    Smoking status: Never    Smokeless tobacco: Never     Social Drivers of Health     Financial Resource Strain: Low Risk  (7/11/2024)    Received from [facility]    Overall Financial Resource Strain (CARDIA)     Difficulty of Paying Living Expenses: Not hard at all   Food Insecurity: No Food Insecurity (7/11/2024)    Received from [facility]    Hunger Vital Sign     Worried About Running Out of Food in the Last Year: Never true     Ran Out of Food in the Last Year: Never true   Transportation Needs: No Transportation Needs (7/11/2024)    Received from [facility]    PRAPARE - Transportation     Lack of Transportation (Medical): No     Lack of Transportation (Non-Medical): No   Physical Activity: Sufficiently Active (7/11/2024)     Received from [facility]    Exercise Vital Sign     Days of Exercise per Week: 3 days     Minutes of Exercise per Session: 120 min   Recent Concern: Physical Activity - Insufficiently Active (4/16/2024)    Received from [facility]    Exercise Vital Sign     Days of Exercise per Week: 2 days     Minutes of Exercise per Session: 60 min   Stress: No Stress Concern Present (7/11/2024)    Received from [facility]    [employer] [place] [employer] - Occupational Stress Questionnaire     Feeling of Stress : Not at all   Housing Stability: Unknown (7/6/2024)    Housing Stability Vital Sign     Unable to Pay for Housing in the Last Year: No     MEDICATIONS:   Current Outpatient Medications:     AJOVY AUTOINJECTOR 225 mg/1.5 mL autoinjector, Inject into the skin., Disp: , Rfl:     carBAMazepine 100 mg/5 mL (5 mL) Susp, , Disp: , Rfl:     cetirizine (ZYRTEC) 10 MG tablet, , Disp: , Rfl:     cholecalciferol, vitamin D3, 125 mcg (5,000 unit) capsule, , Disp: , Rfl:     lamoTRIgine 100 mg TbDL, , Disp: , Rfl:     levETIRAcetam (KEPPRA) 750 MG Tab, Take 750 mg by mouth 2 (two) times daily., Disp: , Rfl:     magnesium 200 mg Tab, , Disp: , Rfl:     montelukast (SINGULAIR) 10 mg tablet, , Disp: , Rfl:     orphenadrine/aspirin/caffeine (NORGESIC FORTE ORAL), , Disp: , Rfl:     progesterone (PROMETRIUM) 100 MG capsule, Take 100 mg by mouth once daily., Disp: , Rfl:     rizatriptan (MAXALT) 10 MG tablet, Take by mouth., Disp: , Rfl:     ubrogepant (UBRELVY ORAL), Take by mouth., Disp: , Rfl:     vitamin B comp and C no.3 (B COMPLEX PLUS VITAMIN C) 15-10-50-5-300 mg Cap, , Disp: , Rfl:     ALLERGIES:   Review of patient's allergies indicates:   Allergen Reactions    Nsaids (non-steroidal anti-inflammatory drug) Rash and Shortness Of Breath    Sumatriptan Other (See Comments)     Severe neck and back pain    Ibuprofen Other (See Comments)     Objective:   VITAL SIGNS: /65 (Patient Position: Sitting)   Pulse 61     General    Vitals reviewed.  Constitutional: She is oriented to person, place, and time. She appears well-developed and well-nourished.   Neurological: She is alert and oriented to person, place, and time.   Psychiatric: She has a normal mood and affect. Her behavior is normal.           MUSCULOSKELETAL EXAM  FOOT: left foot  The affected ankle is compared to the contralateral ankle.    Observation:    There is no edema, erythema, or ecchymosis.   Shoes reveal a normal wear pattern.  + pes cavus  Normal callus pattern on the feet bilaterally.    Achilles tendon and calcaneal insertion reveals no deformities  No leg or intrinsic foot muscle atrophy.  Squatting reveals symmetric pronation of the bilateral feet.   Able to rise on toes with symmetric supination.    Normal gait without evidence of antalgia.    ROM (* = with pain):  Active dorsiflexion to 20° on left and 20° on right  Active plantarflexion to 50° on left and 50° on right    Active ankle inversion to 35° on left and 35° on right  Active ankle eversion to 15° on left and 15° on right  Full active flexion/extension of the toes bilaterally.   Heel cords without tightness bilaterally.    Tenderness To Palpation:  No tenderness at the ATFL, CFL, PTFL, or deltoid ligaments  No tenderness over the distal anterior syndesmosis, distal tibia/fibula, fibular head/shaft  No tenderness at medial or lateral malleoli   No tenderness at navicular, cuboid, cuneiforms, talus, or calcaneous  ;+ mild tenderness along the 3rd and 4th metatarsal and 3rd interweb space  No tenderness along the phalanges  No tenderness at the Achilles tendon calcaneal insertion  No tenderness at the posterior tibial or peroneal tendons    Strength Testing (* = with pain):  Dorsiflexion - 5/5 on left and 5/5 on right  Platarflexion - 5/5 on left and 5/5 on right  Resisted Inversion - 5/5 on left and 5/5 on right  Resisted Eversion - 5/5 on left and 5/5 on right  Great Toe Extension - 5/5 on left  and 5/5 on right  Great Toe Flexion - 5/5 on left and 5/5 on right    Special Tests:  Anterior talar drawer - negative and without dimpling  Talar tilt - negative  Reverse Talar tilt - negative    Heel tap test - negative  Distal tib/fib squeeze test - negative  External rotation stress (Kleiger) test - negative  Martinez squeeze test - negative    Metatarsal squeeze test - positive  Midfoot stress test - negative  Calcaneal squeeze test - negative    Negative single leg hop test    No subluxation of the peroneal tendons with resisted eversion.    Vascular/Sensory Exam:  DP and PT pulses intact bilaterally  No skin changes, no abnormal hair distribution  Sensation intact to light touch throughout the saphenous, sural, superficial peroneal, deep peroneal, and tibial nerve distributions  Negative Tinel's test over tarsal tunnel  2+/4 reflexes at L4 and S1 dermatomes  Capillary refill intact <2 seconds in all toes bilaterally.    TART (Tissue texture abnormality, Asymmetry,  Restriction of motion and/or Tenderness) changes:    Lower Extremity:    Location/joint Finding/restriction   Fibular head Posterior on left   Tibia Neutral   Talocrural joint Neutral   Subtalar Joint Neutral   Cuboid Neutral   Talo-navicular joint Neutral   Navicular-cuneiform joint Left   3rd, 4th Cuneiform-metatarsal joint Left   2nd, 3rd, 4th metatarsal Left   1st, 2nd, 3rd, 4th, 5th phalange Neutral       Key   F= Flexed   E = Extended   R = Rotated   S = Sidebent   TTA = tissue texture abnormality     IMAGIN. X-ray ordered due to left foot pain. (AP, lateral, and oblique views) taken today.   2. X-ray images were reviewed personally by me and then directly with patient.  3. FINDINGS: X-ray images obtained demonstrate no cortical irregularities, sclerosis, osteophyte formation, or subchonral cysts. There is no joint space narrowing.  4. IMPRESSION: No pathology or irregularities appreciated.       Assessment:      Encounter Diagnoses    Name Primary?    Acute pain of left foot Yes    Pes cavus of both feet     Metatarsalgia, left foot     Somatic dysfunction of lower extremity           Plan:   1. Acute left foot metatarsalgia likely secondary to underlying pes cavus and increased strain of the metatarsal arch.  - OMT performed today to address associated biomechanical restrictions and HEP started.   - continue supportive shoe wear with pickleball and increase activity   - recommend ice of 20 minutes at a time as needed for pain control as well as over-the-counter Tylenol  - recommend metatarsal arch pad use bilaterally for increased support  - discussed option left foot MRI for further evaluation, if symptoms persist  -  X-ray images of left foot taken today (AP, lateral, and oblique views) showed no abnormalities. Images were personally reviewed with patient.    2. OMT 1-2 regions. Oral consent obtained.  Reviewed benefits and potential side effects.   - OMT indicated today due to signs and symptoms as well as local and remote somatic dysfunction findings and their related neurokinetic, lymphatic, fascial and/or arteriovenous body connections.   - OMT techniques used: Articulatory   - Treatment was tolerated well. Improvement noted in segmental mobility post-treatment in dysfunctional regions. There were no adverse events and no complications immediately following treatment.     3. Pt. Given the following HEP:  A) Foot intrinsic strengthening exercise series:  Repeat exercises 1-2 daily.  Handout given.     88634 HOME EXERCISE PROGRAM (HEP):  The patient was taught a homegoing physical therapy regimen as described above. The patient demonstrated understanding of the exercises and proper technique of their execution. This interaction took 15 minutes.     4. Follow-up as needed if pain deteriorates or new issue arises.      5. Patient agreeable to today's plan and all questions were answered    This note is dictated using the M*Modal Fluency  Direct word recognition program. There are word recognition mistakes that are occasionally missed on review.

## 2024-12-31 ENCOUNTER — PROCEDURE VISIT (OUTPATIENT)
Dept: PAIN MEDICINE | Facility: CLINIC | Age: 34
End: 2024-12-31
Payer: COMMERCIAL

## 2024-12-31 VITALS
HEIGHT: 66 IN | OXYGEN SATURATION: 100 % | WEIGHT: 187.38 LBS | RESPIRATION RATE: 18 BRPM | DIASTOLIC BLOOD PRESSURE: 61 MMHG | BODY MASS INDEX: 30.11 KG/M2 | TEMPERATURE: 98 F | HEART RATE: 65 BPM | SYSTOLIC BLOOD PRESSURE: 102 MMHG

## 2024-12-31 DIAGNOSIS — M79.18 MYOFASCIAL PAIN: ICD-10-CM

## 2024-12-31 DIAGNOSIS — M54.81 OCCIPITAL NEURALGIA OF RIGHT SIDE: Primary | ICD-10-CM

## 2024-12-31 PROCEDURE — 76942 ECHO GUIDE FOR BIOPSY: CPT | Mod: 26,S$GLB,, | Performed by: ANESTHESIOLOGY

## 2024-12-31 PROCEDURE — 20552 NJX 1/MLT TRIGGER POINT 1/2: CPT | Mod: 59,S$GLB,, | Performed by: ANESTHESIOLOGY

## 2024-12-31 PROCEDURE — 64405 NJX AA&/STRD GR OCPL NRV: CPT | Mod: RT,S$GLB,, | Performed by: ANESTHESIOLOGY

## 2024-12-31 RX ORDER — DEXAMETHASONE SODIUM PHOSPHATE 4 MG/ML
4 INJECTION, SOLUTION INTRA-ARTICULAR; INTRALESIONAL; INTRAMUSCULAR; INTRAVENOUS; SOFT TISSUE
Status: COMPLETED | OUTPATIENT
Start: 2024-12-31 | End: 2024-12-31

## 2024-12-31 RX ADMIN — DEXAMETHASONE SODIUM PHOSPHATE 4 MG: 4 INJECTION, SOLUTION INTRA-ARTICULAR; INTRALESIONAL; INTRAMUSCULAR; INTRAVENOUS; SOFT TISSUE at 05:12

## 2024-12-31 NOTE — PROGRESS NOTES
"  [facility]   Physical Therapy Treatment Note     Name: Maricel Jha  Clinic Number: [id number]    Therapy Diagnosis:   Encounter Diagnosis   Name Primary?    Decreased strength of lower extremity Yes     Physician: Divina Patel DO    Visit Date: 10/24/2024    Physician Orders: PT Eval and Treat   Medical Diagnosis from Referral: Strain of right gastrocnemius muscle, sequela [S86.111S]   Evaluation Date: 10/15/2024  Authorization Period Expiration: 10/8/2025  Plan of Care Expiration: 12/31/2024  Progress Note Due: 11/20/2024  Visit # / Visits authorized: 1/20  FOTO: 1/3     PTA Visit #: 0/5      FOTO first follow up:   FOTO second follow up:      Time In: 1:03 pm  Time Out: 2:04 pm  Total Billable Time: 60 minutes    PTA Visit #: 0/5     SUBJECTIVE     Pt reports: she is doing better and having a little less symptoms.   She was compliant with home exercise program.  Response to previous treatment: Independent with HEP  Functional change: on going      Pain: 2/10  Location: right lower leg, calf      OBJECTIVE     Objective Measures updated at progress report unless specified.     Treatment     Maricel received the treatments listed below:      therapeutic exercises to develop strength, endurance, ROM, flexibility, posture, and core stabilization for 14 minutes including:     Long sitting calf stretch with strap   Modified lunge 6" box red band AP talocrural mob 2 x 10 x  5" holds  Seated soleus raises 10# DBs 3 x 15 reps  Box squats 20in box 3 x 10 reps     manual therapy techniques: Joint mobilizations, Myofacial release, and Soft tissue Mobilization were applied to the: ankle for 12 minutes, including:     Assessment as above   AP talocrural mobs grade III-IV  Subtalar rocking     neuromuscular re-education activities to improve: Balance, Coordination, Kinesthetic, Sense, Proprioception, and Posture for 18 minutes. The following activities were included:     DL heel raise with " "ball between heels 3 x 10 reps  FHL big toe flexion with manual resistance from therapist  - eccentric focus  Short arches in sitting 2 x 10 reps x 5" holds     therapeutic activities to improve functional performance for 04 minutes, including:     Sled Pulls 50# 2 laps      gait training to improve functional mobility and safety for 00 minutes, including:        Patient Education and Home Exercises     Home Exercises Provided and Patient Education Provided     Education provided:   - HEP, Prognosis, PT POC     Written Home Exercises Provided: yes. Exercises were reviewed and Maricel was able to demonstrate them prior to the end of the session.  Maricel demonstrated good  understanding of the education provided. See EMR under Patient Instructions for exercises provided during therapy sessions    ASSESSMENT     Maricel still presenting with limitation with ankle mobility and strengthening. Worked on ankle mobilizations and slight improvement. Continued with foot intrinsics and LE strengthening. Will continue to monitor and progress as able.     Maricel Is progressing well towards her goals.   Pt prognosis is Good.     Pt will continue to benefit from skilled outpatient physical therapy to address the deficits listed in the problem list box on initial evaluation, provide pt/family education and to maximize pt's level of independence in the home and community environment.     Pt's spiritual, cultural and educational needs considered and pt agreeable to plan of care and goals.     Anticipated barriers to physical therapy: scheduling    Goals: Short Term Goals: 4 weeks (NOT MET: in progress)  Patient will be independent in initial HEP to help supplement PT.  Patient will improve ankle dorsiflexion range of motion to 5 degrees to help with gait mechanics.  Patient will be able to perform a squat pain free to help with getting up and down at work.      Long Term Goals: 8-10 weeks (NOT MET: in progress)  Patient will be " independent in updated HEP to help supplement PT and maintain gains made in PT.  Patient will improve FOTO score to >/= predicted to help supplement PT.   Patient will be able to perform >/= 15 single leg heel raises to help with return to jogging.   Patient goal: Patient will be able to play pickleball without pain.     PLAN   Plan of care Certification: 10/15/2024 to 12/31/2024.     Outpatient Physical Therapy 1-2 times weekly for 10 weeks to include the following interventions: Electrical Stimulation , Gait Training, Manual Therapy, Moist Heat/ Ice, Neuromuscular Re-ed, Patient Education, Self Care, Therapeutic Activities, and Therapeutic Exercise.      Davin Fernandez, SPT  Co-treated by: Nelli De Los Santos, PT, DPT, OCS      I certify that I was present in the room directing the student in service delivery and guiding them using my skilled judgment. As the co-signing therapist I have reviewed the students documentation and am responsible for the treatment, assessment, and plan.

## 2024-12-31 NOTE — PROCEDURES
Patient Name: Maricel Jha  MRN: [id number]    INFORMED CONSENT: The procedure, risks, benefits and options were discussed with patient. There are no contraindications to the procedure. The patient expressed understanding and agreed to proceed. The personnel performing the procedure was discussed. I verify that I personally obtained Maricel's consent prior to the start of the procedure and the signed consent can be found on the patient's chart.    Procedure Date: 12/31/2024    Anesthesia: Topical    Pre Procedure diagnosis:   1. Occipital neuralgia of right side    2. Myofascial pain        Post-Procedure diagnosis: same      Sedation: None    PROCEDURE: RIGHT OCCIPITAL NERVE BLOCK UNDER U/S   The patient was placed in prone position. The site of pain and procedure were confirmed with the patient prior to starting the procedure. The patient's nuchal ridge of the occipital bone was identified and prepped with chlorhexidine. After performing time out A 27g gauge 1.5 inch was advanced through the skin and subcutaneous tissues lateral to C2 Between the Inferior oblique muscle and semispinalis capitis under ultrasound guidance using in-plane technique.  Aspiration for blood and CSF was negative.  A total of 6 ml of Bupivacaine 0.25% and 2 mg decadron was injected.  There were no signs or symptoms of intravascular injection. No complications were evident. No specimens collected.    PROCEDURE: RIGHT Cervical Muscles TRIGGER POINT INJECTION  The patient was placed in a seated position and time out was perfomed. The site of pain and procedure were confirmed with the patient prior to starting the procedure. After performing time out. The patient's  trigger points were identified and marked at the right cervical spinalis capitis, trapezius, semispinalis capitis, longissimus capitis. The skin was prepped with chlorhexidine three times.   After performing time out A 27-gauge 1.5 inch  needle was advanced through the skin and  subcutaneous tissues.  Aspiration for blood, air and CSF was negative.  A total of 5 ml of Bupivacaine 0.25% and 2 mg depomedrol was injected at all trigger point.  No complications were evident. No specimens collected.      Blood Loss: Nill  Specimen: None    Bianca Aguayo MD      I reviewed and edited the resident/fellow's note. I conducted my own interview and physical examination. I agree with the findings and documentation. I was present and supervising all critical portions of the procedure.      Osman Yeh MD

## 2025-01-03 DIAGNOSIS — R53.82 CHRONIC FATIGUE: Primary | ICD-10-CM

## 2025-01-12 ENCOUNTER — OFFICE VISIT (OUTPATIENT)
Dept: URGENT CARE | Facility: CLINIC | Age: 35
End: 2025-01-12
Payer: COMMERCIAL

## 2025-01-12 ENCOUNTER — PATIENT MESSAGE (OUTPATIENT)
Dept: URGENT CARE | Facility: CLINIC | Age: 35
End: 2025-01-12

## 2025-01-12 VITALS
TEMPERATURE: 98 F | OXYGEN SATURATION: 97 % | WEIGHT: 187 LBS | HEIGHT: 66 IN | RESPIRATION RATE: 19 BRPM | HEART RATE: 64 BPM | BODY MASS INDEX: 30.05 KG/M2 | SYSTOLIC BLOOD PRESSURE: 108 MMHG | DIASTOLIC BLOOD PRESSURE: 68 MMHG

## 2025-01-12 DIAGNOSIS — J02.9 VIRAL PHARYNGITIS: ICD-10-CM

## 2025-01-12 DIAGNOSIS — J06.9 ACUTE URI: ICD-10-CM

## 2025-01-12 DIAGNOSIS — J01.90 ACUTE NON-RECURRENT SINUSITIS, UNSPECIFIED LOCATION: ICD-10-CM

## 2025-01-12 DIAGNOSIS — J02.9 SORE THROAT: Primary | ICD-10-CM

## 2025-01-12 LAB
CTP QC/QA: YES
MOLECULAR STREP A: NEGATIVE

## 2025-01-12 PROCEDURE — 99214 OFFICE O/P EST MOD 30 MIN: CPT | Mod: S$GLB,,, | Performed by: FAMILY MEDICINE

## 2025-01-12 PROCEDURE — 87651 STREP A DNA AMP PROBE: CPT | Mod: QW,S$GLB,, | Performed by: FAMILY MEDICINE

## 2025-01-12 NOTE — PROGRESS NOTES
"Subjective:      Patient ID: Maricel Jha is a 34 y.o. female.    Vitals:  height is 5' 6" (1.676 m) and weight is 84.8 kg (187 lb). Her oral temperature is 98 °F (36.7 °C). Her blood pressure is 108/68 and her pulse is 64. Her respiration is 19 and oxygen saturation is 97%.     Chief Complaint: Sore Throat (/Entered by patient)    Pt presented symptoms of sore throat approx 2-3 days, fatigue post nasal drips and headaches  Pt deny of coughing, fever, body aches  Pt took covid test yesterday, test NEG    Sore Throat   This is a new problem. The current episode started in the past 7 days. The problem has been gradually worsening. There has been no fever. The patient is experiencing no pain. Associated symptoms include headaches. She has tried nothing for the symptoms. The treatment provided no relief.       HENT:  Positive for sore throat.    Neurological:  Positive for headaches.      Objective:     Physical Exam   Constitutional: She is oriented to person, place, and time. She appears well-developed. She is cooperative.  Non-toxic appearance. She does not appear ill. No distress.   HENT:   Head: Normocephalic and atraumatic.   Ears:   Right Ear: Hearing, tympanic membrane, external ear and ear canal normal. no impacted cerumen  Left Ear: Hearing, tympanic membrane, external ear and ear canal normal. no impacted cerumen  Nose: Congestion present. No mucosal edema, rhinorrhea or nasal deformity. No epistaxis. Right sinus exhibits no maxillary sinus tenderness and no frontal sinus tenderness. Left sinus exhibits no maxillary sinus tenderness and no frontal sinus tenderness.   Mouth/Throat: Uvula is midline, oropharynx is clear and moist and mucous membranes are normal. No trismus in the jaw. Normal dentition. No uvula swelling. No oropharyngeal exudate or posterior oropharyngeal edema.      Comments: +ve pharyngeal erythema w/o tonsillar swelling or exudates.        Eyes: Conjunctivae and lids are normal. No scleral " icterus.   Neck: Trachea normal and phonation normal. Neck supple. No edema present. No erythema present. No neck rigidity present.   Cardiovascular: Normal rate, regular rhythm, normal heart sounds and normal pulses.   No murmur heard.  Pulmonary/Chest: Effort normal and breath sounds normal. No stridor. No respiratory distress. She has no decreased breath sounds. She has no wheezes. She has no rhonchi. She has no rales.   Abdominal: Normal appearance. She exhibits no distension. There is no abdominal tenderness. There is no left CVA tenderness and no right CVA tenderness.   Musculoskeletal: Normal range of motion.         General: No deformity. Normal range of motion.      Cervical back: She exhibits no tenderness.   Lymphadenopathy:     She has no cervical adenopathy.   Neurological: She is alert, oriented to person, place, and time and at baseline. She exhibits normal muscle tone. Coordination normal.   Skin: Skin is warm, dry, intact, not diaphoretic and not pale.   Psychiatric: Her speech is normal and behavior is normal. Judgment and thought content normal.   Nursing note and vitals reviewed.      Assessment:     1. Sore throat    2. Acute URI    3. Viral pharyngitis    4. Acute non-recurrent sinusitis, unspecified location        Plan:   Discussed exam findings/results/diagnosis/plan with patient. Advised to f/u with PCP within 2-5 days. ER precautions given if symptoms get any worse. All questions answered. Patient verbally understood and agreed with treatment plan.  Educational materials and instructions regarding the visit diagnosis and management provided.     Sore throat  -     POCT Strep A, Molecular    Acute URI    Viral pharyngitis    Acute non-recurrent sinusitis, unspecified location    Other orders  -     diphenhydrAMINE-aluminum-magnesium hydroxide-simethicone-LIDOcaine viscous HCl 2%; Swish and spit 15 mLs every 4 (four) hours as needed (Sore throat).  Dispense: 240 each; Refill: 0

## 2025-02-04 ENCOUNTER — PATIENT MESSAGE (OUTPATIENT)
Dept: PAIN MEDICINE | Facility: CLINIC | Age: 35
End: 2025-02-04
Payer: COMMERCIAL

## 2025-02-12 ENCOUNTER — OFFICE VISIT (OUTPATIENT)
Dept: PAIN MEDICINE | Facility: CLINIC | Age: 35
End: 2025-02-12
Payer: COMMERCIAL

## 2025-02-12 VITALS
TEMPERATURE: 98 F | HEART RATE: 60 BPM | HEIGHT: 66 IN | BODY MASS INDEX: 29.41 KG/M2 | SYSTOLIC BLOOD PRESSURE: 99 MMHG | WEIGHT: 183 LBS | OXYGEN SATURATION: 100 % | RESPIRATION RATE: 18 BRPM | DIASTOLIC BLOOD PRESSURE: 60 MMHG

## 2025-02-12 DIAGNOSIS — M54.81 OCCIPITAL NEURALGIA OF RIGHT SIDE: Primary | ICD-10-CM

## 2025-02-12 DIAGNOSIS — G44.021 INTRACTABLE CHRONIC CLUSTER HEADACHE: ICD-10-CM

## 2025-02-12 DIAGNOSIS — M79.18 MYOFASCIAL PAIN: ICD-10-CM

## 2025-02-12 DIAGNOSIS — G89.4 CHRONIC PAIN DISORDER: ICD-10-CM

## 2025-02-12 NOTE — PROGRESS NOTES
Established Visit    PCP: Jackie Valenzuela MD      CHIEF COMPLAINT: Headaches      Interval History 2/12/2025:  Patient is here for follow up S/P right occipital nerve block and right cervical trigger point injection on 12/31/2024 with more than 50% pain relief that last until today. She reports no adverse effects from the procedure.     Original HISTORY OF PRESENT ILLNESS: Maricel Jha presents to the clinic for the evaluation of the above pain. The pain started 3 years ago.     Original Pain Description:  The patient is a 34 year old female with pmh of AIAD who presents for evaluation and treatment of headaches. The headaches started 3 years ago with no inciting factor. The pain is right sided and is located in the upper neck/base of the skull and spread towards the right side of the face. Pain in the back is described as a soreness, pain in the front is more of a stabbing and searing pain.  Often she will get episodes of tearing and facial flushing. Exacerbating factors: Stress, wind, brushing teeth, brushing hair. Mitigating factors: medication. Symptoms interfere with work and quality of life. The patient feels like symptoms have been unchanged. No photophobia, nausea, vomiting, auras.     She recently moved from [place] to [place]. She see's a neurologist in [place] who researches AIDA and is happy with her care there. Her neurologist over there manages her medications. She gets cervical trigger point injections and greater occipital nerve blocks about every 6-8 weeks. These give her about 75% relief for 4 weeks. Steroids are added about every other injection. She has had 6 injections so far, last one was earlier this week. Overall, she is happy with her treatment and wants to continue as is for now.     Original PAIN SCORES:  Best: Pain is 2  Worst: Pain is 9  Current: Pain is 4        2/12/2025      3:11 PM   Last 3 PDI Scores   Pain Disability Index (PDI) 28     INTERVAL HISTORY: (Newest visit at the bottom)   Interval History (7/1/2024):   34 year old female returns in follow up for occipital neuralgia. Patient had bilateral occipital nerve blocks on 6/17/2024. Patient reports no benefit. She localizes pain to the right occipital prominence. Typically got good benefit with landmark guided procedure. No benefit with US guided procedure. Patient still follows with her [facility]ist via virtual visits.  Patient denies recent falls, trauma, hospitalizations, or infections.     Interval History 8/8/2024:   Patient following up for occipital neuralgia. Patient said occipital nerve block every 6 weeks usually helps and would like to be scheduled to have the procedure done. Her scheduled right sphenopalatine block procedure was cancelled due to high co pays. Patient said her headaches and neck pain is not controlled with her current medication regimen. Her neurologist added on memantine to her current medication. Her pain today is 2/10. Denied fevers, vomiting, visual changes or difficulties with balance or ambulation.     Interval History 12/20/2024  Patient with hx of AIDA follow up after recent bilateral ONB under US with steroids.  She reports limited improvement after this injection, last only a few days.  In contrast, previously she received 6-8 weeks of pain relief.  Recently started on Keppra.  Takes Norgesic PRN for pain.  Denies vision changes.  Pain today 7/10, on right side.    6 weeks of Conservative therapy:  PT: n/a   HEP: n/a    Treatments / Medications: (Ice/Heat/NSAIDS/APAP/etc):  Carbamazepine  Ajovy  Maxalt  Ubrelvy  Lamotrigine  Memantin     Interventional Pain Procedures:   Right Occipital nerve blocks   12/31/2024: Right occipital nerve block and TPI with more than 50% pain relief.       History reviewed. No pertinent past medical history.  History reviewed. No pertinent surgical  history.  Social History     Socioeconomic History    Marital status:    Tobacco Use    Smoking status: Never    Smokeless tobacco: Never     Social Drivers of Health     Financial Resource Strain: Low Risk  (7/11/2024)    Received from [facility]    Overall Financial Resource Strain (CARDIA)     Difficulty of Paying Living Expenses: Not hard at all   Food Insecurity: No Food Insecurity (7/11/2024)    Received from [facility]    Hunger Vital Sign     Worried About Running Out of Food in the Last Year: Never true     Ran Out of Food in the Last Year: Never true   Transportation Needs: No Transportation Needs (7/11/2024)    Received from [facility]    PRAPARE - Transportation     Lack of Transportation (Medical): No     Lack of Transportation (Non-Medical): No   Physical Activity: Sufficiently Active (7/11/2024)    Received from [facility]    Exercise Vital Sign     Days of Exercise per Week: 3 days     Minutes of Exercise per Session: 120 min   Recent Concern: Physical Activity - Insufficiently Active (4/16/2024)    Received from [facility]    Exercise Vital Sign     Days of Exercise per Week: 2 days     Minutes of Exercise per Session: 60 min   Stress: No Stress Concern Present (7/11/2024)    Received from [facility]    [employer] - Occupational Stress Questionnaire     Feeling of Stress : Not at all   Housing Stability: Unknown (7/6/2024)    Housing Stability Vital Sign     Unable to Pay for Housing in the Last Year: No     No family history on file.    Review of patient's allergies indicates:   Allergen Reactions    Nsaids (non-steroidal anti-inflammatory drug) Rash and Shortness Of Breath    Sumatriptan Other (See Comments)     Severe neck and back pain    Ibuprofen Other (See Comments)       Current Outpatient Medications   Medication Sig    AJOVY AUTOINJECTOR 225 mg/1.5 mL autoinjector Inject into the skin.    carBAMazepine 100 mg/5 mL (5 mL) Susp     cetirizine  "(ZYRTEC) 10 MG tablet     cholecalciferol, vitamin D3, 125 mcg (5,000 unit) capsule     lamoTRIgine 100 mg TbDL     levETIRAcetam (KEPPRA) 750 MG Tab Take 750 mg by mouth 2 (two) times daily.    magnesium 200 mg Tab     montelukast (SINGULAIR) 10 mg tablet     orphenadrine/aspirin/caffeine (NORGESIC FORTE ORAL)     progesterone (PROMETRIUM) 100 MG capsule Take 100 mg by mouth once daily.    rizatriptan (MAXALT) 10 MG tablet Take by mouth.    ubrogepant (UBRELVY ORAL) Take by mouth.    vitamin B comp and C no.3 (B COMPLEX PLUS VITAMIN C) 15-10-50-5-300 mg Cap     diphenhydrAMINE-aluminum-magnesium hydroxide-simethicone-LIDOcaine viscous HCl 2% Swish and spit 15 mLs every 4 (four) hours as needed (Sore throat).     No current facility-administered medications for this visit.     ROS:  GENERAL: No fever. No chills. No fatigue.   HEENT: + tearing, denies vision changes  CV: Denies chest pain.   PULM: Denies of shortness of breath.  GI: Denies constipation. No diarrhea. No abdominal pain. Denies nausea. Denies vomiting. No blood in stool.  HEME: Denies bleeding problems.  : Denies urgency. No painful urination. No blood in urine.  MS: + neck pain  SKIN: Denies rash.   NEURO: + headaches, no weakness  PSYCH:  Denies difficulty sleeping. No anxiety. Denies depression. No suicidal thoughts.     VITALS:   Vitals:    02/12/25 1512   BP: 99/60   Pulse: 60   Resp: 18   Temp: 98 °F (36.7 °C)   SpO2: 100%   Weight: 83 kg (182 lb 15.7 oz)   Height: 5' 6" (1.676 m)   PainSc:   4   PainLoc: Head     PHYSICAL EXAM:   GENERAL: Well appearing, in no acute distress, alert and oriented x3.  PSYCH:  Mood and affect appropriate.  SKIN: Skin color, texture, turgor normal, no rashes or lesions.  HEENT:  Normocephalic, atraumatic. Cranial nerves grossly intact. Mild tenderness to palpation over the cervical paraspinal muscles and occipital ridge bilaterally.   Full ROM with flexion, extension, lateral rotation. Lhermitte's and Spurling's " are negative  PULM: No evidence of respiratory difficulty, symmetric chest rise.  GI:  Non-distended  EXTREMITIES: No deformities, edema, or skin discoloration.   NEURO: CN II-XII are intact. Sensation is equal and appropriate bilaterally. Bilateral upper and lower extremity strength is normal and symmetric. Bilateral upper and lower extremity coordination and muscle stretch reflexes are physiologic and symmetric. Plantar response are downgoing. Negative Hoffmans.No clonus  GAIT: normal.    IMAGING:    EXAM: Brain w/wo contrast MRI     DATE: 5/29/2021 11:00     INDICATION:  - concern for brainstem MS/infalmmatory/demyelinating condition, patient with trigeminal pain/redness x4 weeks. please thin cuts brain stem.     COMPARISON: [facility] 5/29/2021     TECHNIQUE: Multiplanar multisequence images of the brain were obtained before and after the intravenous administration of contrast material.     DISCUSSION:     No acute parenchymal abnormality.   No hydrocephalus, midline shift, mass effect, restricted diffusion or acute hemorrhage.   Pineal region, pituitary gland, cranio cervical junction, orbits and internal auditory canals are unremarkable.   Major intracranial flow-voids are well-maintained.   No mass or abnormal enhancement. The cisternal segments of the trigeminal nerves are unremarkable with no abnormal enhancement.   No osseous lesions.     IMPRESSION:     Unremarkable MRI of the brain with and without contrast.     ASSESSMENT: 34 y.o. year old female with pain, consistent with:    Encounter Diagnoses   Name Primary?    Occipital neuralgia of right side Yes    Myofascial pain     Intractable chronic cluster headache     Chronic pain disorder          DISCUSSION: Maricel Jha is aA 34-year-old patient with short-lasting unilateral neuralgiform headache attacks with autonomic symptoms (AIDA) and occipital neuralgia. She has experienced significant symptom relief with her current medication regimen, cervical trigger  point injections (TPI), and occipital nerve blocks. She is satisfied with her current treatment plan.  Plan:  Procedures: Schedule for an in-office, landmark-guided right occipital nerve block per patient preference (no ultrasound guidance).  Imaging: Previous imaging reviewed and discussed with the patient today.  Neurology Follow-up: Encourage continued follow-up with her neurologist in [place].  Medications: Continue current medication regimen as prescribed by her neurologist.  Follow-up: Return to clinic (RTC) in two weeks following the procedure for reassessment.    I spent a total of 30 minutes on the day of the visit.  This includes face to face time and non-face to face time preparing to see the patient by reviewing previous labs/imaging, obtaining and/or reviewing separately obtained history, documenting clinical information in the electronic or other health record, independently interpreting results and communicating results to the patient/family/caregiver.    Osman Yeh

## 2025-02-24 ENCOUNTER — OFFICE VISIT (OUTPATIENT)
Dept: SPORTS MEDICINE | Facility: CLINIC | Age: 35
End: 2025-02-24
Payer: COMMERCIAL

## 2025-02-24 VITALS — SYSTOLIC BLOOD PRESSURE: 108 MMHG | DIASTOLIC BLOOD PRESSURE: 67 MMHG | HEART RATE: 64 BPM

## 2025-02-24 DIAGNOSIS — M79.10 MYALGIA: ICD-10-CM

## 2025-02-24 DIAGNOSIS — M99.02 SOMATIC DYSFUNCTION OF THORACIC REGION: ICD-10-CM

## 2025-02-24 DIAGNOSIS — G44.059 SHORT LASTING UNILATERAL NEURALGIFORM HEADACHE WITH CONJUNCTIVAL INJECTION AND TEARING (SUNCT), NOT INTRACTABLE: ICD-10-CM

## 2025-02-24 DIAGNOSIS — M26.609 TMJ (TEMPOROMANDIBULAR JOINT DISORDER): Primary | ICD-10-CM

## 2025-02-24 DIAGNOSIS — M99.01 CERVICAL (NECK) REGION SOMATIC DYSFUNCTION: ICD-10-CM

## 2025-02-24 DIAGNOSIS — M99.00 SOMATIC DYSFUNCTION OF HEAD REGION: ICD-10-CM

## 2025-02-24 PROCEDURE — 98926 OSTEOPATH MANJ 3-4 REGIONS: CPT | Mod: S$GLB,,, | Performed by: NEUROMUSCULOSKELETAL MEDICINE & OMM

## 2025-02-24 PROCEDURE — 3074F SYST BP LT 130 MM HG: CPT | Mod: CPTII,S$GLB,, | Performed by: NEUROMUSCULOSKELETAL MEDICINE & OMM

## 2025-02-24 PROCEDURE — 99213 OFFICE O/P EST LOW 20 MIN: CPT | Mod: 25,S$GLB,, | Performed by: NEUROMUSCULOSKELETAL MEDICINE & OMM

## 2025-02-24 PROCEDURE — 99999 PR PBB SHADOW E&M-EST. PATIENT-LVL III: CPT | Mod: PBBFAC,,, | Performed by: NEUROMUSCULOSKELETAL MEDICINE & OMM

## 2025-02-24 PROCEDURE — 1160F RVW MEDS BY RX/DR IN RCRD: CPT | Mod: CPTII,S$GLB,, | Performed by: NEUROMUSCULOSKELETAL MEDICINE & OMM

## 2025-02-24 PROCEDURE — 3078F DIAST BP <80 MM HG: CPT | Mod: CPTII,S$GLB,, | Performed by: NEUROMUSCULOSKELETAL MEDICINE & OMM

## 2025-02-24 PROCEDURE — 1159F MED LIST DOCD IN RCRD: CPT | Mod: CPTII,S$GLB,, | Performed by: NEUROMUSCULOSKELETAL MEDICINE & OMM

## 2025-02-24 NOTE — PROGRESS NOTES
Subjective:     Maricel Jha    Chief Complaint   Patient presents with    Pain     Head and neck      HPI    Maricel is a 34 y.o. female with PMHx of AIDA coming in today for follow up for right-sided headache/neck pain. Since last visit the pain has Improved, overall noting a decreased frequency of her migraine headaches, but she still has a persistent daily headache.  Patient reports recent occipital nerve blocks and TPI with Dr. Yeh. She reports fair amount of improvements which usually last for 5-6 weeks. Pt. Continues to wear  and attend PT 2x/week which she also feels has helped some. The pain is better with stretching, prior OMT type treatments, TMJ treatments, and occipital nerve blocks. Reports headaches daily, but seem to be slightly improved. Describes the pain as a 5/10 currently.      Office note from 12/9/24 reviewed    Procedures reviewed:   6/11/24- Bilateral OCCIPITAL NERVE BLOCK UNDER U/S  Dr. Yeh  7/2/24- Right OCCIPITAL NERVE BLOCK  Dr. Yeh  9/3/24- Right Greater and lesser OCCIPITAL NERVE BLOCK via anatomic guidance Dr. Yeh  11/19/24- Bilateral Greater and lesser OCCIPITAL NERVE BLOCK via anatomic guidance - Dr. Yeh  12/31/24 - Right occipital nerve block via US guidance; Right Cx paraspinal TPI - Dr. Yeh    PAST MEDICAL HISTORY: History reviewed. No pertinent past medical history.  PAST SURGICAL HISTORY: History reviewed. No pertinent surgical history.  FAMILY HISTORY: No family history on file.    SOCIAL HISTORY:   Social History     Socioeconomic History    Marital status:    Tobacco Use    Smoking status: Never    Smokeless tobacco: Never     Social Drivers of Health     Financial Resource Strain: Low Risk  (7/11/2024)    [place] [facility]    Overall Financial Resource Strain (CARDIA)     Difficulty of Paying Living Expenses: Not hard at all   Food Insecurity: No Food Insecurity (7/11/2024)    [place] [facility]    Hunger  Vital Sign     Worried About Running Out of Food in the Last Year: Never true     Ran Out of Food in the Last Year: Never true   Transportation Needs: No Transportation Needs (7/11/2024)    Received from [facility]    PRAPARE - Transportation     Lack of Transportation (Medical): No     Lack of Transportation (Non-Medical): No   Physical Activity: Sufficiently Active (7/11/2024)    Received from [facility]    Exercise Vital Sign     Days of Exercise per Week: 3 days     Minutes of Exercise per Session: 120 min   Recent Concern: Physical Activity - Insufficiently Active (4/16/2024)    Received from [facility]    Exercise Vital Sign     Days of Exercise per Week: 2 days     Minutes of Exercise per Session: 60 min   Stress: No Stress Concern Present (7/11/2024)    Received from [facility]    [employer] - Occupational Stress Questionnaire     Feeling of Stress : Not at all   Housing Stability: Unknown (7/6/2024)    Housing Stability Vital Sign     Unable to Pay for Housing in the Last Year: No     MEDICATIONS:   Current Outpatient Medications:     AJOVY AUTOINJECTOR 225 mg/1.5 mL autoinjector, Inject into the skin., Disp: , Rfl:     carBAMazepine 100 mg/5 mL (5 mL) Susp, , Disp: , Rfl:     cetirizine (ZYRTEC) 10 MG tablet, , Disp: , Rfl:     cholecalciferol, vitamin D3, 125 mcg (5,000 unit) capsule, , Disp: , Rfl:     diphenhydrAMINE-aluminum-magnesium hydroxide-simethicone-LIDOcaine viscous HCl 2%, Swish and spit 15 mLs every 4 (four) hours as needed (Sore throat)., Disp: 240 each, Rfl: 0    lamoTRIgine 100 mg TbDL, , Disp: , Rfl:     levETIRAcetam (KEPPRA) 750 MG Tab, Take 750 mg by mouth 2 (two) times daily., Disp: , Rfl:     magnesium 200 mg Tab, , Disp: , Rfl:     montelukast (SINGULAIR) 10 mg tablet, , Disp: , Rfl:     orphenadrine/aspirin/caffeine (NORGESIC FORTE ORAL), , Disp: , Rfl:     progesterone (PROMETRIUM) 100 MG capsule, Take 100 mg by mouth once daily., Disp: , Rfl:      rizatriptan (MAXALT) 10 MG tablet, Take by mouth., Disp: , Rfl:     ubrogepant (UBRELVY ORAL), Take by mouth., Disp: , Rfl:     vitamin B comp and C no.3 (B COMPLEX PLUS VITAMIN C) 15-10-50-5-300 mg Cap, , Disp: , Rfl:     ALLERGIES:   Review of patient's allergies indicates:   Allergen Reactions    Nsaids (non-steroidal anti-inflammatory drug) Rash and Shortness Of Breath    Sumatriptan Other (See Comments)     Severe neck and back pain    Ibuprofen Other (See Comments)     Objective:     VITAL SIGNS: /67 (Patient Position: Sitting)   Pulse 64    General    Vitals reviewed.  Constitutional: She is oriented to person, place, and time. She appears well-developed and well-nourished.   Neurological: She is alert and oriented to person, place, and time.   Psychiatric: She has a normal mood and affect. Her behavior is normal.           MUSCULOSKELETAL EXAM:  Cervical Spine: right cervical region     Observation:    Posture:  Upright  No obvious shoulder un-leveling while standing.    No edema, erythema, or ecchymosis noted in cervical and thoraic spine regions.    No midline skin abnormalities.    No atrophy of upper limb musculature.  Gait: Non-antalgic with Neutral ankle mechanics and Pes planus. Gait without trendelenberg, heel walking, toe walking, and tandem walking.     Tenderness:  No tenderness throughout the cervical spine upon spinous process palpation  No tenderness over the base of the occiput   No bony deformities or step-offs palpated.   No tenderness over the posterior paraspinals in cervical spine on right  No upper trapezius tenderness      Range of Motion (* = with pain):  CERVICAL SPINE  Full AROM in flexion, extension, sidebending, and rotation.      Structural Exam:  TART (Tissue texture abnormality, Asymmetry,  Restriction of motion and/or Tenderness) changes:     Head:   Cranial Rhythmic Impulse (CRI): restricted with Decreased amplitude, decreased with jaw occlusion, but less with bite  guard    Strain Patterns: Left Torsion  Occipitoatlantal (OA) Joint: ES-left, R-right  Suture/Bone Restriction Side   Occipitomastoid (OM)  Absent L   Parietal mastoid (PM) Absent    Petrojugular (PJ) Present L   Sphenosquamous pivot (SSP) Absent    Zygomaticotemporal (ZT) Absent    Zygomaticofrontal (ZF) Absent    Frontonasal Absent    [place] bone Absent    Parietal bone Absent    Frontal bone Absent      Muscle Tissue Texture Abnormality Side   Lateral pterygoid Absent    Medial pterygoid Absent         Cervical Spine   C1 TTA RIGHT   C2 TTA RIGHT   C3 TTA RIGHT   C4 Neutral   C5 Neutral   C6 Neutral   C7 ERS LEFT      Thoracic Spine   T1 Neutral   T2 Neutral   T3 Neutral   T4 Neutral   T5 ERS RIGHT   T6 ERS RIGHT   T7 Neutral   T8 Neutral   T9 Neutral   T10 Neutral   T11 Neutral   T12 Neutral     Rib cage: neutral    Upper extremity: neutral     Key   F= Flexed   E = Extended   R = Rotated   S = Sidebent   TTA = tissue texture abnormality      Neurovascular Exam:  Spurling's - negative on left and negative on right  Sensation intact to light touch in the C5-T1 dermatomes bilaterally.   Intact and symmetric radial pulses bilaterally.    Assessment:      Encounter Diagnoses   Name Primary?    TMJ (temporomandibular joint disorder) Yes    Short lasting unilateral neuralgiform headache with conjunctival injection and tearing (SUNCT), not intractable     Myalgia     Somatic dysfunction of head region     Somatic dysfunction of thoracic region     Cervical (neck) region somatic dysfunction           Plan:   1. Neck and jaw pain with underlying AIDA headaches as well as biomechanical restrictions of the upper kinetic chain and poor posture- overall improved  - OMT performed again today to address associated biomechanical restrictions  and HEP started.   - Continue proper posture and work desk and desk accommodations to facilitate good posture.  - continue fPT for jaw pain  - Continue follow up with Dr. Yeh in  pain management as needed  - currently followed by Dr. Gabriela BARTLETT at [facility]  for TMJ  - X-ray images of cervical spine taken 5/2/24 (AP, lateral, bilateral obliques, and odontoid  flexion and extension views) showed No acute osseous abnormality seen. Straightening of the normal cervical lordosis which may be positional or related to muscle spasm.      2. HEP recommendations:  Restart:  A)  Supine Thoracic extension exercise: 10-15 reps, twice daily. Handout also given.   B) Sidelying reach and roll stretch: Laying on your side with arms extend out, pull back top arm and flex at the elbow, then rotate thoracic spine and open up arm and chest. Repeat for a total of 10 reps on each side, twice daily. Hand-out also given.     3. OMT 3-4 regions. Oral consent obtained.  Reviewed benefits and potential side effects.   - OMT indicated today due to signs and symptoms as well as local and remote somatic dysfunction findings and their related neurokinetic, lymphatic, fascial and/or arteriovenous body connections.   - OMT techniques used: Myofascial Release, Muscle Energy, HVLA and Cranial    - Treatment was tolerated well. Improvement noted in segmental mobility post-treatment in dysfunctional regions. There were no adverse events and no complications immediately following treatment.      4.  Follow-up as needed if pain deteriorates or new issue arises     5. Patient agreeable to today's plan and all questions were answered     This note is dictated using the M*Modal Fluency Direct word recognition program. There are word recognition mistakes that are occasionally missed on review.

## 2025-03-10 ENCOUNTER — OFFICE VISIT (OUTPATIENT)
Dept: SPORTS MEDICINE | Facility: CLINIC | Age: 35
End: 2025-03-10
Payer: COMMERCIAL

## 2025-03-10 VITALS — DIASTOLIC BLOOD PRESSURE: 73 MMHG | SYSTOLIC BLOOD PRESSURE: 116 MMHG | HEART RATE: 75 BPM

## 2025-03-10 DIAGNOSIS — M99.08 SOMATIC DYSFUNCTION OF RIB CAGE REGION: ICD-10-CM

## 2025-03-10 DIAGNOSIS — M99.02 SOMATIC DYSFUNCTION OF THORACIC REGION: ICD-10-CM

## 2025-03-10 DIAGNOSIS — M26.609 TMJ (TEMPOROMANDIBULAR JOINT DISORDER): ICD-10-CM

## 2025-03-10 DIAGNOSIS — M99.01 CERVICAL (NECK) REGION SOMATIC DYSFUNCTION: ICD-10-CM

## 2025-03-10 DIAGNOSIS — M99.07 UPPER EXTREMITY SOMATIC DYSFUNCTION: ICD-10-CM

## 2025-03-10 DIAGNOSIS — M99.00 SOMATIC DYSFUNCTION OF HEAD REGION: ICD-10-CM

## 2025-03-10 DIAGNOSIS — G44.059 SHORT LASTING UNILATERAL NEURALGIFORM HEADACHE WITH CONJUNCTIVAL INJECTION AND TEARING (SUNCT), NOT INTRACTABLE: Primary | ICD-10-CM

## 2025-03-10 PROCEDURE — 99214 OFFICE O/P EST MOD 30 MIN: CPT | Mod: 25,S$GLB,, | Performed by: NEUROMUSCULOSKELETAL MEDICINE & OMM

## 2025-03-10 PROCEDURE — 98927 OSTEOPATH MANJ 5-6 REGIONS: CPT | Mod: S$GLB,,, | Performed by: NEUROMUSCULOSKELETAL MEDICINE & OMM

## 2025-03-10 PROCEDURE — 99999 PR PBB SHADOW E&M-EST. PATIENT-LVL III: CPT | Mod: PBBFAC,,, | Performed by: NEUROMUSCULOSKELETAL MEDICINE & OMM

## 2025-03-10 PROCEDURE — 1160F RVW MEDS BY RX/DR IN RCRD: CPT | Mod: CPTII,S$GLB,, | Performed by: NEUROMUSCULOSKELETAL MEDICINE & OMM

## 2025-03-10 PROCEDURE — 1159F MED LIST DOCD IN RCRD: CPT | Mod: CPTII,S$GLB,, | Performed by: NEUROMUSCULOSKELETAL MEDICINE & OMM

## 2025-03-10 PROCEDURE — 3078F DIAST BP <80 MM HG: CPT | Mod: CPTII,S$GLB,, | Performed by: NEUROMUSCULOSKELETAL MEDICINE & OMM

## 2025-03-10 PROCEDURE — 3074F SYST BP LT 130 MM HG: CPT | Mod: CPTII,S$GLB,, | Performed by: NEUROMUSCULOSKELETAL MEDICINE & OMM

## 2025-03-10 NOTE — PROGRESS NOTES
Subjective:     Maricel Jha    Chief Complaint   Patient presents with    Pain     Head/facial     HPI    Maricel is a 34 y.o. female with PMHx of AIDA coming in today for follow up for right-sided headache/neck pain. Since last visit the pain has Deteriorated, with recent head pain flare that began about 1 week ago. The pain began around her right eye and travels to her right jaw. It is worsened with brushing teeth, or light touch to the area. It is improved with sleeping. She is going to see Dr. Yeh for repeat occipital nerve blocks and cervical trigger point injection. Pt. Continues to wear  and attend PT 2x/week which she also feels has helped some. The pain is better with stretching, prior OMT type treatments, TMJ treatments, and occipital nerve blocks. Reports headaches daily, but seem to be slightly improved. Describes the pain as a 9/10 currently.      Office note from 2/24/25 reviewed    Procedures reviewed:   6/11/24- Bilateral OCCIPITAL NERVE BLOCK UNDER U/S  Dr. Yeh  7/2/24- Right OCCIPITAL NERVE BLOCK  Dr. Yeh  9/3/24- Right Greater and lesser OCCIPITAL NERVE BLOCK via anatomic guidance Dr. Yeh  11/19/24- Bilateral Greater and lesser OCCIPITAL NERVE BLOCK via anatomic guidance - Dr. Yeh  12/31/24 - Right occipital nerve block via US guidance; Right Cx paraspinal TPI - Dr. Yeh    PAST MEDICAL HISTORY: History reviewed. No pertinent past medical history.  PAST SURGICAL HISTORY: History reviewed. No pertinent surgical history.  FAMILY HISTORY: No family history on file.    SOCIAL HISTORY:   Social History     Socioeconomic History    Marital status:    Tobacco Use    Smoking status: Never    Smokeless tobacco: Never     Social Drivers of Health     Financial Resource Strain: Low Risk  (7/11/2024)    Received from [facility]    Overall Financial Resource Strain (CARDIA)     Difficulty of Paying Living Expenses: Not hard at all   Food Insecurity: No Food  Insecurity (7/11/2024)    Received from [facility]    Hunger Vital Sign     Worried About Running Out of Food in the Last Year: Never true     Ran Out of Food in the Last Year: Never true   Transportation Needs: No Transportation Needs (7/11/2024)    Received from [facility]    PRAPARE - Transportation     Lack of Transportation (Medical): No     Lack of Transportation (Non-Medical): No   Physical Activity: Sufficiently Active (7/11/2024)    Received from [facility]    Exercise Vital Sign     Days of Exercise per Week: 3 days     Minutes of Exercise per Session: 120 min   Recent Concern: Physical Activity - Insufficiently Active (4/16/2024)    Received from [facility]    Exercise Vital Sign     Days of Exercise per Week: 2 days     Minutes of Exercise per Session: 60 min   Stress: No Stress Concern Present (7/11/2024)    Received from [facility]    [employer] - Occupational Stress Questionnaire     Feeling of Stress : Not at all   Housing Stability: Unknown (7/6/2024)    Housing Stability Vital Sign     Unable to Pay for Housing in the Last Year: No     MEDICATIONS:   Current Outpatient Medications:     AJOVY AUTOINJECTOR 225 mg/1.5 mL autoinjector, Inject into the skin., Disp: , Rfl:     carBAMazepine 100 mg/5 mL (5 mL) Susp, , Disp: , Rfl:     cetirizine (ZYRTEC) 10 MG tablet, , Disp: , Rfl:     cholecalciferol, vitamin D3, 125 mcg (5,000 unit) capsule, , Disp: , Rfl:     lamoTRIgine 100 mg TbDL, , Disp: , Rfl:     levETIRAcetam (KEPPRA) 750 MG Tab, Take 750 mg by mouth 2 (two) times daily., Disp: , Rfl:     magnesium 200 mg Tab, , Disp: , Rfl:     montelukast (SINGULAIR) 10 mg tablet, , Disp: , Rfl:     orphenadrine/aspirin/caffeine (NORGESIC FORTE ORAL), , Disp: , Rfl:     progesterone (PROMETRIUM) 100 MG capsule, Take 100 mg by mouth once daily., Disp: , Rfl:     rizatriptan (MAXALT) 10 MG tablet, Take by mouth., Disp: , Rfl:     ubrogepant (UBRELVY ORAL), Take by mouth.,  Disp: , Rfl:     vitamin B comp and C no.3 (B COMPLEX PLUS VITAMIN C) 15-10-50-5-300 mg Cap, , Disp: , Rfl:     ALLERGIES:   Review of patient's allergies indicates:   Allergen Reactions    Nsaids (non-steroidal anti-inflammatory drug) Rash and Shortness Of Breath    Sumatriptan Other (See Comments)     Severe neck and back pain    Ibuprofen Other (See Comments)     Objective:     VITAL SIGNS: /73 (Patient Position: Sitting)   Pulse 75    General    Vitals reviewed.  Constitutional: She is oriented to person, place, and time. She appears well-developed and well-nourished.   Neurological: She is alert and oriented to person, place, and time.   Psychiatric: She has a normal mood and affect. Her behavior is normal.           MUSCULOSKELETAL EXAM:  Cervical Spine: right cervical region     Observation:    Posture:  Upright  No obvious shoulder un-leveling while standing.    No edema, erythema, or ecchymosis noted in cervical and thoraic spine regions.    No midline skin abnormalities.    No atrophy of upper limb musculature.  Gait: Non-antalgic with Neutral ankle mechanics and Pes planus. Gait without trendelenberg, heel walking, toe walking, and tandem walking.     Tenderness:  No tenderness throughout the cervical spine upon spinous process palpation  No tenderness over the base of the occiput   No bony deformities or step-offs palpated.   Mild tenderness over the posterior paraspinals in cervical spine on right  No upper trapezius tenderness      Range of Motion (* = with pain):  CERVICAL SPINE  Full AROM in flexion, extension, sidebending, and rotation.      Structural Exam:  TART (Tissue texture abnormality, Asymmetry,  Restriction of motion and/or Tenderness) changes:     Head:   Cranial Rhythmic Impulse (CRI): restricted with Decreased amplitude    Strain Patterns: Left Torsion  Occipitoatlantal (OA) Joint: ES-left, R-right  Suture/Bone Restriction Side   Occipitomastoid (OM)  Present L   Parietal mastoid  (PM) Absent    Petrojugular (PJ) Absent L   Sphenosquamous pivot (SSP) Present R   Zygomaticotemporal (ZT) Absent    Zygomaticofrontal (ZF) Absent    Frontonasal Absent    [place] bone Present R   Parietal bone Absent    Frontal bone Absent    Sphenopalatine ganglion present R     Muscle Tissue Texture Abnormality Side   Lateral pterygoid Present R   Medial pterygoid Present R        Cervical Spine   C1 TTA RIGHT   C2 TTA RIGHT   C3 TTA RIGHT   C4 Neutral   C5 Neutral   C6 Neutral   C7 Neutral      Thoracic Spine   T1 Neutral   T2 ERS RIGHT   T3 Neutral   T4 Neutral   T5 Neutral   T6 Neutral   T7 NS-left,R-right   T8 NS-left,R-right   T9 NS-left,R-right   T10 NS-left,R-right   T11 Neutral   T12 Neutral     Rib cage: R1 inhaled on right, R8 external torsion on right    Upper extremity: right thoracic outlet TTA     Key   F= Flexed   E = Extended   R = Rotated   S = Sidebent   TTA = tissue texture abnormality      Neurovascular Exam:  Spurling's - negative on left and negative on right  Sensation intact to light touch in the C5-T1 dermatomes bilaterally.   Intact and symmetric radial pulses bilaterally.    Assessment:      Encounter Diagnoses   Name Primary?    Short lasting unilateral neuralgiform headache with conjunctival injection and tearing (SUNCT), not intractable Yes    TMJ (temporomandibular joint disorder)     Somatic dysfunction of head region     Cervical (neck) region somatic dysfunction     Somatic dysfunction of thoracic region     Somatic dysfunction of rib cage region     Upper extremity somatic dysfunction           Plan:   1. Flair of headaches and jaw pain with underlying AIDA headaches as well as biomechanical restrictions of the upper kinetic chain and poor posture  - OMT performed again today to address associated biomechanical restrictions  and HEP started.   - Continue proper posture and work desk and desk accommodations to facilitate good posture.  - continue fPT for jaw pain  - Continue  follow up with Dr. Yeh in pain management, discussed reconsidering sphenopalatine ganglion block if headache flairs persist  - currently followed by Dr. Gabriela [facility]TT at [facility] for TMJ  - X-ray images of cervical spine taken 5/2/24 (AP, lateral, bilateral obliques, and odontoid  flexion and extension views) showed No acute osseous abnormality seen. Straightening of the normal cervical lordosis which may be positional or related to muscle spasm.      2. OMT 5-6 regions. Oral consent obtained.  Reviewed benefits and potential side effects.   - OMT indicated today due to signs and symptoms as well as local and remote somatic dysfunction findings and their related neurokinetic, lymphatic, fascial and/or arteriovenous body connections.   - OMT techniques used: Myofascial Release, Muscle Energy, still's technique and Cranial    - Treatment was tolerated well. Improvement noted in segmental mobility post-treatment in dysfunctional regions. There were no adverse events and no complications immediately following treatment.      3.  Follow-up as needed if pain deteriorates or new issue arises     4. Patient agreeable to today's plan and all questions were answered     This note is dictated using the M*Modal Fluency Direct word recognition program. There are word recognition mistakes that are occasionally missed on review.

## 2025-03-11 ENCOUNTER — PROCEDURE VISIT (OUTPATIENT)
Dept: PAIN MEDICINE | Facility: CLINIC | Age: 35
End: 2025-03-11
Payer: COMMERCIAL

## 2025-03-11 VITALS
OXYGEN SATURATION: 100 % | TEMPERATURE: 98 F | HEART RATE: 57 BPM | SYSTOLIC BLOOD PRESSURE: 116 MMHG | DIASTOLIC BLOOD PRESSURE: 53 MMHG | RESPIRATION RATE: 18 BRPM | WEIGHT: 180.75 LBS | BODY MASS INDEX: 29.05 KG/M2 | HEIGHT: 66 IN

## 2025-03-11 DIAGNOSIS — M79.18 MYOFASCIAL PAIN: ICD-10-CM

## 2025-03-11 DIAGNOSIS — M54.81 OCCIPITAL NEURALGIA OF RIGHT SIDE: Primary | ICD-10-CM

## 2025-03-11 RX ADMIN — DEXAMETHASONE SODIUM PHOSPHATE 4 MG: 4 INJECTION, SOLUTION INTRA-ARTICULAR; INTRALESIONAL; INTRAMUSCULAR; INTRAVENOUS; SOFT TISSUE at 10:03

## 2025-03-11 NOTE — PROCEDURES
Patient Name: Maricel Jha  MRN: [id number]    INFORMED CONSENT: The procedure, risks, benefits and options were discussed with patient. There are no contraindications to the procedure. The patient expressed understanding and agreed to proceed. The personnel performing the procedure was discussed. I verify that I personally obtained Maricel's consent prior to the start of the procedure and the signed consent can be found on the patient's chart.    Procedure Date: 03/11/2025    Anesthesia: Topical    Pre Procedure diagnosis: Occipital neuralgia of R side, myofascial pain    Post-Procedure diagnosis: same      Sedation: None    PROCEDURE: RIGHT OCCIPITAL NERVE BLOCK UNDER U/S   The patient was placed in prone position. The site of pain and procedure were confirmed with the patient prior to starting the procedure. The patient's nuchal ridge of the occipital bone was identified and prepped with chlorhexidine. After performing time out A 27g gauge 1.5 inch was advanced through the skin and subcutaneous tissues lateral to C2 Between the Inferior oblique muscle and semispinalis capitis under ultrasound guidance using in-plane technique.  Aspiration for blood and CSF was negative.  A total of 5 ml of Bupivacaine 0.25% and 2 mg decadron was injected.  There were no signs or symptoms of intravascular injection. No complications were evident. No specimens collected.      Blood Loss: Nill  Specimen: None    PROCEDURE: RIGHT Cervical Muscles TRIGGER POINT INJECTION  The patient was placed in a seated position and time out was perfomed. The site of pain and procedure were confirmed with the patient prior to starting the procedure. After performing time out. The patient's  trigger points were identified and marked at the right cervical spinalis capitis, trapezius, semispinalis capitis, longissimus capitis. The skin was prepped with chlorhexidine three times.   After performing time out A 27-gauge 1.5 inch  needle was advanced  through the skin and subcutaneous tissues.  Aspiration for blood, air and CSF was negative.  A total of 5 ml of Bupivacaine 0.25% and 2 mg decadron was injected at all trigger point.  No complications were evident. No specimens collected.        Blood Loss: Nill  Specimen: None       Vitaliy Craft MD       I reviewed and edited the resident/fellow's note. I conducted my own interview and physical examination. I agree with the findings and documentation. I was present and supervising all critical portions of the procedure.      Osman Yeh MD

## 2025-03-11 NOTE — PROGRESS NOTES
TWO PATIENT IDENTIFIERS VERIFIED.  ALLERGIES VERIFIED.    Time out preformed prior to procedure  Present during time out  DR. ZAN PEREZ, MANE  3.  GEORGE BUENO MD    Patient identified using 2 identifiers   (name of patient with correct spelling first and last, date of birth)  Patient name: CYNTHIA CEE  : 1990    Procedure being performed: RIGHT OCCIPITAL NERVE BLOCK & RIGHT CERVICAL TRIGGER POINT INJECTION      DEXAMETHASONE 4mg/mL--> 4 mg USED  -- 0 mg WASTED  NDC 8948-8238-43  LOT  [id number]  EXP  2026 AUG     0.25 % BUPIVVACAINE  25  MG/10 ML--> 5 mL USED   --5 mL WASTED  NDC 2465-5059-27  LOT [id number]  EXP 2025-NOV-01        Site of procedure: POSTERIOR NECK        Consent Done: YES.

## 2025-03-13 RX ORDER — DEXAMETHASONE SODIUM PHOSPHATE 4 MG/ML
4 INJECTION, SOLUTION INTRA-ARTICULAR; INTRALESIONAL; INTRAMUSCULAR; INTRAVENOUS; SOFT TISSUE
Status: COMPLETED | OUTPATIENT
Start: 2025-03-13 | End: 2025-03-11

## 2025-03-13 RX ORDER — DEXAMETHASONE SODIUM PHOSPHATE 4 MG/ML
4 INJECTION, SOLUTION INTRA-ARTICULAR; INTRALESIONAL; INTRAMUSCULAR; INTRAVENOUS; SOFT TISSUE
Status: COMPLETED | OUTPATIENT
Start: 2025-03-13 | End: 2025-03-13

## 2025-03-13 RX ADMIN — DEXAMETHASONE SODIUM PHOSPHATE 4 MG: 4 INJECTION, SOLUTION INTRA-ARTICULAR; INTRALESIONAL; INTRAMUSCULAR; INTRAVENOUS; SOFT TISSUE at 06:03

## 2025-03-24 ENCOUNTER — OFFICE VISIT (OUTPATIENT)
Dept: DERMATOLOGY | Facility: CLINIC | Age: 35
End: 2025-03-24
Payer: COMMERCIAL

## 2025-03-24 DIAGNOSIS — D22.9 MULTIPLE BENIGN NEVI: ICD-10-CM

## 2025-03-24 DIAGNOSIS — L23.9 ALLERGIC CONTACT DERMATITIS, UNSPECIFIED TRIGGER: Primary | ICD-10-CM

## 2025-03-24 DIAGNOSIS — D18.01 CHERRY ANGIOMA: ICD-10-CM

## 2025-03-24 DIAGNOSIS — Z12.83 SKIN CANCER SCREENING: ICD-10-CM

## 2025-03-24 PROCEDURE — 99999 PR PBB SHADOW E&M-EST. PATIENT-LVL III: CPT | Mod: PBBFAC,,, | Performed by: DERMATOLOGY

## 2025-03-24 PROCEDURE — 1159F MED LIST DOCD IN RCRD: CPT | Mod: CPTII,S$GLB,, | Performed by: DERMATOLOGY

## 2025-03-24 PROCEDURE — 99204 OFFICE O/P NEW MOD 45 MIN: CPT | Mod: S$GLB,,, | Performed by: DERMATOLOGY

## 2025-03-24 PROCEDURE — 1160F RVW MEDS BY RX/DR IN RCRD: CPT | Mod: CPTII,S$GLB,, | Performed by: DERMATOLOGY

## 2025-03-24 RX ORDER — DESONIDE 0.5 MG/G
CREAM TOPICAL 2 TIMES DAILY
Qty: 60 G | Refills: 1 | Status: SHIPPED | OUTPATIENT
Start: 2025-03-24

## 2025-03-24 NOTE — PATIENT INSTRUCTIONS
Aman or Anel serna [place] deodorant      Sun Protection      The [facility] would like to remind you of the importance of sun protection all year round and particularly during the summer when the suns rays are the strongest. It has been proven that both acute and chronic sun exposure damages our cells and leads to skin cancer. Beyond skin cancer, the sun causes 90% of the symptoms of premature skin aging, including wrinkles, lentigines (brown spots), and thin, easily bruised skin. Proper sun protection can help prevent these unwanted conditions.    Many patients report that they dont go in the sun. It has been shown that the average person receives 18 hours of incidental sun exposure per week during activities such as walking through parking lots, driving, or sitting next to windows. This accumulates to several bad sunburns per year!    In choosing sunscreen, you want one that protects against both UVA and UVB rays (broad spectrum). It is recommended that you use one of SPF 30 or higher. It is important to apply the sunscreen about 20 minutes prior to sun exposure. Most sunscreens are chemical sunscreens and a reaction must take place in the skin so that they are effective. If they are applied and then you are immediately exposed to the sun or start sweating, this reaction has not had time to take place and you are therefore unprotected. Sunscreen needs to be reapplied every 2 hours if you are participating in water sports or sweating. We recommend Elta MD or CeraVe sunscreens for daily use; however there are many options and it is most important for you to find one that you will use on a consistent basis.    If you have sensitive skin, you may do best with a sunscreen that contains only physical blockers in the active ingredient section. The only physical blockers available in the [place] currently are titanium dioxide or zinc oxide. These are typically thicker and harder to apply, however they  afford very good protection. Neutrogena Sensitive Skin, Blue Lizard Sensitive Skin (pink top) or Neutrogena Pure and Free are popular ones.     Aside from sunscreen, clothes with UV protection (UPF), wide brimmed hats, and sunglasses are other means of sun protection that we recommend.      Based on a recent study (6/2021) and out of an abundance of caution, we are recommending that you AVOID the following sunscreens as they may contain the carcinogen, benzene:    Spray and gel sunscreens  Any [facility] or [facility] brands as well as Max Block and TopCare brands   Neutrogena Ultra Sheer Dry-touch Water Resistant Sunscreen LOTION SPF 70   Neutrogena Sheer Zinc Dry-touch Face Sunscreen LOTION SPF 50   5.   Aveeno Baby Continuous Protection Sensitive Skin Sunscreen LOTION - Broad Spectrum SPF 50    Please note that Benzene is not an ingredient or the degradation product of any ingredient in any sunscreen. This study suggested that the findings are a result of contamination in the manufacturing process. At this point, we don't know how effectively Benzene gets through the skin, if it gets absorbed systemically, and what effects it may have.     We do know that ultraviolet radiation is a well-established carcinogen. Please use daily sun protection/avoidance and use of at least SPF 30, broad-spectrum sunscreen not listed above.                       [facility]  YESSICA HAYDEN - DERMATOLOGY [address]  [address]  [facility] [address]  Dept: [phone]  Dept Fax: [phone]

## 2025-03-24 NOTE — PROGRESS NOTES
Subjective:      Patient ID:  Maricel Jha is a 35 y.o. female who presents for   Chief Complaint   Patient presents with    Skin Check     TBSE     Patient here for Total Body Skin Exam    Last seen by dermatologist: 1yr ago    no - personal history of atypical moles removed  no - personal history of MM   yes - family history of MM ( father ) - he has also had BCCs  no - childhood blistering sunburns  no - tanning bed use  no - personal history of NMSC    No new concerning moles or lesions    Also reports pink, itchy rashes in B axillae, on and off x many months. Has tried different deodorants.    Review of Systems   Constitutional:  Negative for fever and chills.   Respiratory:  Negative for cough and shortness of breath.    Gastrointestinal:  Negative for nausea and vomiting.   Musculoskeletal:  Negative for joint swelling and arthralgias.   Skin:  Positive for activity-related sunscreen use. Negative for daily sunscreen use, recent sunburn and wears hat.   All other systems reviewed and are negative.  Hematologic/Lymphatic: Does not bruise/bleed easily.       Objective:   Physical Exam   Constitutional: She appears well-developed and well-nourished. No distress.   Neurological: She is alert and oriented to person, place, and time. She is not disoriented.   Psychiatric: She has a normal mood and affect.   Skin:   Areas Examined (abnormalities noted in diagram):   Scalp / Hair Palpated and Inspected  Head / Face Inspection Performed  Neck Inspection Performed  Chest / Axilla Inspection Performed  Abdomen Inspection Performed  Genitals / Buttocks / Groin Inspection Performed  Back Inspection Performed  RUE Inspected  LUE Inspection Performed  RLE Inspected  LLE Inspection Performed  Nails and Digits Inspection Performed                         Diagram Legend     Erythematous scaling macule/papule c/w actinic keratosis       Vascular papule c/w angioma      Pigmented verrucoid papule/plaque c/w seborrheic  keratosis      Yellow umbilicated papule c/w sebaceous hyperplasia      Irregularly shaped tan macule c/w lentigo     1-2 mm smooth white papules consistent with Milia      Movable subcutaneous cyst with punctum c/w epidermal inclusion cyst      Subcutaneous movable cyst c/w pilar cyst      Firm pink to brown papule c/w dermatofibroma      Pedunculated fleshy papule(s) c/w skin tag(s)      Evenly pigmented macule c/w junctional nevus     Mildly variegated pigmented, slightly irregular-bordered macule c/w mildly atypical nevus      Flesh colored to evenly pigmented papule c/w intradermal nevus       Pink pearly papule/plaque c/w basal cell carcinoma      Erythematous hyperkeratotic cursted plaque c/w SCC      Surgical scar with no sign of skin cancer recurrence      Open and closed comedones      Inflammatory papules and pustules      Verrucoid papule consistent consistent with wart     Erythematous eczematous patches and plaques     Dystrophic onycholytic nail with subungual debris c/w onychomycosis     Umbilicated papule    Erythematous-base heme-crusted tan verrucoid plaque consistent with inflamed seborrheic keratosis     Erythematous Silvery Scaling Plaque c/w Psoriasis     See annotation      Assessment / Plan:        Allergic contact dermatitis, unspecified trigger  -     desonide (DESOWEN) 0.05 % cream; Apply topically 2 (two) times daily. x 1-2 wks then prn flares only  Dispense: 60 g; Refill: 1  Rec: [employer] or [employer] [place] hypoallergenic deodorants    Multiple benign nevi  Benign-appearing nevi present on exam today. Reassurance provided. Periodically examine moles and return to clinic if any moles change or become symptomatic (bleeding, itching, pain, etc).    Cherry angioma  This is a benign vascular lesion. Reassurance given. No treatment required. Treatment of benign, asymptomatic lesions may be considered cosmetic.    Skin cancer screening  Total body skin examination performed today including at  least 12 points as noted in physical examination. No lesions suspicious for malignancy noted.  Patient instructed in importance of daily broad spectrum sunscreen use with spf at least 30. Sun avoidance and topical protection/protective clothing discussed.    Follow up in about 1 year (around 3/24/2026) for skin check or sooner for any concerns.

## 2025-04-03 ENCOUNTER — OFFICE VISIT (OUTPATIENT)
Dept: SLEEP MEDICINE | Facility: CLINIC | Age: 35
End: 2025-04-03
Payer: COMMERCIAL

## 2025-04-03 VITALS
WEIGHT: 184.06 LBS | HEIGHT: 66 IN | SYSTOLIC BLOOD PRESSURE: 138 MMHG | HEART RATE: 60 BPM | BODY MASS INDEX: 29.58 KG/M2 | DIASTOLIC BLOOD PRESSURE: 68 MMHG

## 2025-04-03 DIAGNOSIS — G47.19 OTHER HYPERSOMNIA: ICD-10-CM

## 2025-04-03 DIAGNOSIS — G44.049 CHRONIC PAROXYSMAL HEMICRANIA, NOT INTRACTABLE: ICD-10-CM

## 2025-04-03 DIAGNOSIS — F51.01 PRIMARY INSOMNIA: ICD-10-CM

## 2025-04-03 DIAGNOSIS — R53.82 CHRONIC FATIGUE: Primary | ICD-10-CM

## 2025-04-03 PROCEDURE — 3008F BODY MASS INDEX DOCD: CPT | Mod: CPTII,S$GLB,, | Performed by: NURSE PRACTITIONER

## 2025-04-03 PROCEDURE — 3075F SYST BP GE 130 - 139MM HG: CPT | Mod: CPTII,S$GLB,, | Performed by: NURSE PRACTITIONER

## 2025-04-03 PROCEDURE — 1159F MED LIST DOCD IN RCRD: CPT | Mod: CPTII,S$GLB,, | Performed by: NURSE PRACTITIONER

## 2025-04-03 PROCEDURE — 3078F DIAST BP <80 MM HG: CPT | Mod: CPTII,S$GLB,, | Performed by: NURSE PRACTITIONER

## 2025-04-03 PROCEDURE — 99999 PR PBB SHADOW E&M-EST. PATIENT-LVL IV: CPT | Mod: PBBFAC,,, | Performed by: NURSE PRACTITIONER

## 2025-04-03 PROCEDURE — 99204 OFFICE O/P NEW MOD 45 MIN: CPT | Mod: S$GLB,,, | Performed by: NURSE PRACTITIONER

## 2025-04-03 NOTE — PROGRESS NOTES
"Referred by Dali Li MD     NEW PATIENT VISIT    Maricel Jha  is a pleasant 35 y.o. female who presents in the Spring of 2025 for sleep evaluation.    See assessment below for further history.    History reviewed. No pertinent past medical history.Problem List[1]Current Medications[2]      Vitals:    04/03/25 1414   BP: 138/68   BP Location: Right arm   Patient Position: Sitting   Pulse: 60   Weight: 83.5 kg (184 lb 1.4 oz)   Height: 5' 6" (1.676 m)     Physical Exam:    GEN:   Well-appearing  Psych:  Appropriate affect, demonstrates insight  SKIN:  No rash on the face or bridge of the nose      LABS:   No results found for: "CO2"      No echocardiogram results found for the past 12 months     No results found for: "FERRITIN"    RECORDS REVIEWED:      ASSESSMENT    Sig PMH: Trigeminal autonomic cephalagia, TMJ  PROBLEM DESCRIPTION/ Sx on Presentation  STATUS PLAN     Sx  JENNY   Presentation:     Has been recommended to get a sleep study in light of her complex history.    Both parents have JENNY.    no - snoring  no - gasping arousals/coughing  no - witnessed apneas, pauses in breathing    no - night sweats    yes - AM headaches    no - dry mouth/sore throat      new   -we discussed sleep testing to evaluate for JENNY     -discussed possible treatments for JENNY including CPAP therapy       Daytime symptoms       Does not feel refreshed when waking up in the morning. Feels excessively sleepy during the day and/or periods of rest.     ESS 2/24 on intake    new   -will reassess sleepiness after evaluation for JENNY     Insomnia     Has noticed for the past 2-3 years that sleep is not always refreshing.  She is also waking up "clockwork" at least twice a night, unexplainable.    no - difficulty with sleep onset    yes - difficulty with sleep maintenance    SLEEP SCHEDULE   Duration    Wind- down    Envmnt    CBTi    Meds prior    Meds now    Bed Time 11PM-1AM   Lights out    Latency 30 minutes - 1hr   Arousals 2 "   Back to sleep 5 min   Stim. ctrl    Wake time 930-11AM (work)  11AM-12PM (off)   Caffeine    Naps 0   Nocturia 2   Work                  new   -will reassess after evaluation for sleep-disordered breathing       Nocturia     x 2 per sleep period    new          RTC:  will arrange RTC depending on results of sleep testing         PLAN      -recommend sleep testing   -HST ordered  -discussed trial therapy if JENNY present and the patient is open to a trial of CPAP therapy  -discussed the etiology of obstructive sleep apnea as well as the potential ramifications of untreated sleep apnea, which could include daytime sleepiness, hypertension, heart disease and/or stroke. We discussed potential treatment options, which could include weight loss, body positioning, continuous positive airway pressure (CPAP), oral appliance, Inspire, or referral for surgical consideration.        Advised on plan of care. Answered all patient questions. Patient verbalized understanding and voiced agreement with plan of care.                         [1]   Patient Active Problem List  Diagnosis    Decreased strength of lower extremity   [2]   Current Outpatient Medications:     AJOVY AUTOINJECTOR 225 mg/1.5 mL autoinjector, Inject into the skin., Disp: , Rfl:     carBAMazepine 100 mg/5 mL (5 mL) Susp, , Disp: , Rfl:     cetirizine (ZYRTEC) 10 MG tablet, , Disp: , Rfl:     cholecalciferol, vitamin D3, 125 mcg (5,000 unit) capsule, , Disp: , Rfl:     desonide (DESOWEN) 0.05 % cream, Apply topically 2 (two) times daily. x 1-2 wks then prn flares only, Disp: 60 g, Rfl: 1    lamoTRIgine 100 mg TbDL, , Disp: , Rfl:     levETIRAcetam (KEPPRA) 750 MG Tab, Take 750 mg by mouth 2 (two) times daily., Disp: , Rfl:     magnesium 200 mg Tab, , Disp: , Rfl:     montelukast (SINGULAIR) 10 mg tablet, , Disp: , Rfl:     orphenadrine/aspirin/caffeine (NORGESIC FORTE ORAL), , Disp: , Rfl:     progesterone (PROMETRIUM) 100 MG capsule, Take 100 mg by mouth once  daily., Disp: , Rfl:     rizatriptan (MAXALT) 10 MG tablet, Take by mouth., Disp: , Rfl:     ubrogepant (UBRELVY ORAL), Take by mouth., Disp: , Rfl:     vitamin B comp and C no.3 (B COMPLEX PLUS VITAMIN C) 15-10-50-5-300 mg Cap, , Disp: , Rfl:

## 2025-04-04 ENCOUNTER — HOSPITAL ENCOUNTER (OUTPATIENT)
Dept: SLEEP MEDICINE | Facility: OTHER | Age: 35
Discharge: HOME | End: 2025-04-04
Attending: NURSE PRACTITIONER
Payer: COMMERCIAL

## 2025-04-04 DIAGNOSIS — R53.82 CHRONIC FATIGUE: ICD-10-CM

## 2025-04-04 DIAGNOSIS — F51.01 PRIMARY INSOMNIA: ICD-10-CM

## 2025-04-04 DIAGNOSIS — G44.049 CHRONIC PAROXYSMAL HEMICRANIA, NOT INTRACTABLE: ICD-10-CM

## 2025-04-04 DIAGNOSIS — G47.19 OTHER HYPERSOMNIA: ICD-10-CM

## 2025-04-04 PROCEDURE — 95800 SLP STDY UNATTENDED: CPT

## 2025-04-07 ENCOUNTER — TELEPHONE (OUTPATIENT)
Dept: SLEEP MEDICINE | Facility: OTHER | Age: 35
End: 2025-04-07
Payer: COMMERCIAL

## 2025-04-07 NOTE — TELEPHONE ENCOUNTER
Left message that we need the home sleep study device back if not today we need it back tomorrow morning.

## 2025-04-08 PROBLEM — R53.82 CHRONIC FATIGUE: Status: ACTIVE | Noted: 2025-04-08

## 2025-04-09 ENCOUNTER — PATIENT MESSAGE (OUTPATIENT)
Dept: SLEEP MEDICINE | Facility: CLINIC | Age: 35
End: 2025-04-09

## 2025-04-09 PROCEDURE — 95800 SLP STDY UNATTENDED: CPT | Mod: 26,,, | Performed by: INTERNAL MEDICINE

## 2025-04-10 ENCOUNTER — RESULTS FOLLOW-UP (OUTPATIENT)
Dept: SLEEP MEDICINE | Facility: CLINIC | Age: 35
End: 2025-04-10

## 2025-04-10 DIAGNOSIS — R53.82 CHRONIC FATIGUE: Primary | ICD-10-CM

## 2025-04-10 DIAGNOSIS — G47.30 SLEEP-DISORDERED BREATHING: ICD-10-CM

## 2025-04-10 DIAGNOSIS — G47.19 OTHER HYPERSOMNIA: ICD-10-CM

## 2025-04-10 DIAGNOSIS — F51.01 PRIMARY INSOMNIA: ICD-10-CM

## 2025-04-10 DIAGNOSIS — G44.049 CHRONIC PAROXYSMAL HEMICRANIA, NOT INTRACTABLE: ICD-10-CM

## 2025-04-24 ENCOUNTER — TELEPHONE (OUTPATIENT)
Dept: PAIN MEDICINE | Facility: CLINIC | Age: 35
End: 2025-04-24
Payer: COMMERCIAL

## 2025-04-24 NOTE — TELEPHONE ENCOUNTER
----- Message from [employer] sent at 4/24/2025 12:09 PM CDT -----  Name of Who is Calling: CYNTHIA CEE [[id number]]What is the request in detail: Pt is requesting a call back to see if she can get a different date for procedure and copay amount. Please assist. Can the clinic reply by [facility]: NoWhat Number to Call Back if not in [facility]: [phone]

## 2025-04-25 ENCOUNTER — TELEPHONE (OUTPATIENT)
Dept: PAIN MEDICINE | Facility: CLINIC | Age: 35
End: 2025-04-25
Payer: COMMERCIAL

## 2025-04-25 DIAGNOSIS — M54.81 OCCIPITAL NEURALGIA OF RIGHT SIDE: Primary | ICD-10-CM

## 2025-04-29 ENCOUNTER — TELEPHONE (OUTPATIENT)
Dept: PAIN MEDICINE | Facility: CLINIC | Age: 35
End: 2025-04-29
Payer: COMMERCIAL

## 2025-04-29 NOTE — TELEPHONE ENCOUNTER
Staff spoke with patient in regards to her request to push her appointment back till after May 6 her original appointment date. Staff explained to the patient his next available appointment is May 13 th. Patient states she wants to keep her original appointment date as is.

## 2025-04-29 NOTE — TELEPHONE ENCOUNTER
----- Message from Med Assistant Maria Esther sent at 4/29/2025  1:48 PM CDT -----  Regarding: Return Call  Message Type: Return Call Who Called:CYNTHIA CEE [[id number]]  Who Left Message for Patient:Roman Hylton MA  Does the patient know what this is regarding?  yes  Best Call Back Number:[phone]   Additional Information: Patient is returning a call.  Please assist.

## 2025-05-06 ENCOUNTER — PROCEDURE VISIT (OUTPATIENT)
Dept: PAIN MEDICINE | Facility: CLINIC | Age: 35
End: 2025-05-06
Payer: COMMERCIAL

## 2025-05-06 VITALS
HEIGHT: 66 IN | DIASTOLIC BLOOD PRESSURE: 71 MMHG | BODY MASS INDEX: 29.41 KG/M2 | RESPIRATION RATE: 18 BRPM | OXYGEN SATURATION: 100 % | WEIGHT: 183 LBS | TEMPERATURE: 98 F | SYSTOLIC BLOOD PRESSURE: 111 MMHG | HEART RATE: 57 BPM

## 2025-05-06 DIAGNOSIS — M79.18 MYOFASCIAL PAIN: ICD-10-CM

## 2025-05-06 DIAGNOSIS — M54.81 OCCIPITAL NEURALGIA OF RIGHT SIDE: Primary | ICD-10-CM

## 2025-05-06 PROCEDURE — 76942 ECHO GUIDE FOR BIOPSY: CPT | Mod: S$GLB,,, | Performed by: ANESTHESIOLOGY

## 2025-05-06 PROCEDURE — 64405 NJX AA&/STRD GR OCPL NRV: CPT | Mod: RT,S$GLB,, | Performed by: ANESTHESIOLOGY

## 2025-05-06 PROCEDURE — 20552 NJX 1/MLT TRIGGER POINT 1/2: CPT | Mod: 59,S$GLB,, | Performed by: ANESTHESIOLOGY

## 2025-05-06 RX ADMIN — DEXAMETHASONE SODIUM PHOSPHATE 4 MG: 4 INJECTION, SOLUTION INTRA-ARTICULAR; INTRALESIONAL; INTRAMUSCULAR; INTRAVENOUS; SOFT TISSUE at 11:05

## 2025-05-06 NOTE — PROCEDURES
Patient Name: Maricel Jha  MRN: [id number]     INFORMED CONSENT: The procedure, risks, benefits and options were discussed with patient. There are no contraindications to the procedure. The patient expressed understanding and agreed to proceed. The personnel performing the procedure was discussed. I verify that I personally obtained Maricel's consent prior to the start of the procedure and the signed consent can be found on the patient's chart.     Procedure Date: 03/11/2025     Anesthesia: Topical     Pre Procedure diagnosis: Occipital neuralgia of R side, myofascial pain     Post-Procedure diagnosis: same        Sedation: None     PROCEDURE: RIGHT OCCIPITAL NERVE BLOCK UNDER U/S   The patient was placed in prone position. The site of pain and procedure were confirmed with the patient prior to starting the procedure. The patient's nuchal ridge of the occipital bone was identified and prepped with chlorhexidine. After performing time out A 27g gauge 1.5 inch was advanced through the skin and subcutaneous tissues lateral to C2 Between the Inferior oblique muscle and semispinalis capitis under ultrasound guidance using in-plane technique.  Aspiration for blood and CSF was negative.  A total of 5 ml of Bupivacaine 0.25% and 2 mg decadron was injected.  There were no signs or symptoms of intravascular injection. No complications were evident. No specimens collected.        Blood Loss: Nill  Specimen: None     PROCEDURE: RIGHT Cervical Muscles TRIGGER POINT INJECTION  The patient was placed in a seated position and time out was perfomed. The site of pain and procedure were confirmed with the patient prior to starting the procedure. After performing time out. The patient's  trigger points were identified and marked at the right cervical spinalis capitis, trapezius, semispinalis capitis, longissimus capitis. The skin was prepped with chlorhexidine three times.   After performing time out A 27-gauge 1.5 inch  needle was  advanced through the skin and subcutaneous tissues.  Aspiration for blood, air and CSF was negative.  A total of 5 ml of Bupivacaine 0.25% and 2 mg decadron was injected at all trigger point.  No complications were evident. No specimens collected.        Blood Loss: Nill  Specimen: None     Wilber Duran MD  PGY-5  Interventional Pain Management Fellow  [facility]  Pager: [phone]     I reviewed and edited the resident/fellow's note. I conducted my own interview and physical examination. I agree with the findings and documentation. I was present and supervising all critical portions of the procedure.      Osman Yeh MD

## 2025-05-07 ENCOUNTER — PATIENT MESSAGE (OUTPATIENT)
Dept: SLEEP MEDICINE | Facility: CLINIC | Age: 35
End: 2025-05-07
Payer: COMMERCIAL

## 2025-05-07 ENCOUNTER — TELEPHONE (OUTPATIENT)
Dept: SLEEP MEDICINE | Facility: CLINIC | Age: 35
End: 2025-05-07
Payer: COMMERCIAL

## 2025-05-07 RX ORDER — DEXAMETHASONE SODIUM PHOSPHATE 4 MG/ML
4 INJECTION, SOLUTION INTRA-ARTICULAR; INTRALESIONAL; INTRAMUSCULAR; INTRAVENOUS; SOFT TISSUE
Status: COMPLETED | OUTPATIENT
Start: 2025-05-07 | End: 2025-05-06

## 2025-05-07 NOTE — TELEPHONE ENCOUNTER
Staff left voice message to return patient call.     ----- Message from Zeenat sent at 5/7/2025  3:53 PM CDT -----  Type: Patient CallWho Called: Patient Does the patient know what this is regarding? Pt is requesting a call back to schedule her sleep study. Please advise Does the patient rather a call back or a response via [facility]? [employer] Call Back Number: [phone] Additional Information:

## 2025-05-08 ENCOUNTER — PATIENT MESSAGE (OUTPATIENT)
Dept: SLEEP MEDICINE | Facility: CLINIC | Age: 35
End: 2025-05-08
Payer: COMMERCIAL

## 2025-05-08 ENCOUNTER — TELEPHONE (OUTPATIENT)
Dept: SLEEP MEDICINE | Facility: OTHER | Age: 35
End: 2025-05-08
Payer: COMMERCIAL

## 2025-05-12 ENCOUNTER — OFFICE VISIT (OUTPATIENT)
Dept: SPORTS MEDICINE | Facility: CLINIC | Age: 35
End: 2025-05-12
Payer: COMMERCIAL

## 2025-05-12 VITALS — DIASTOLIC BLOOD PRESSURE: 75 MMHG | SYSTOLIC BLOOD PRESSURE: 117 MMHG | HEART RATE: 71 BPM

## 2025-05-12 DIAGNOSIS — M99.08 SOMATIC DYSFUNCTION OF RIB CAGE REGION: ICD-10-CM

## 2025-05-12 DIAGNOSIS — M99.01 CERVICAL (NECK) REGION SOMATIC DYSFUNCTION: ICD-10-CM

## 2025-05-12 DIAGNOSIS — M26.609 TMJ (TEMPOROMANDIBULAR JOINT DISORDER): ICD-10-CM

## 2025-05-12 DIAGNOSIS — M99.00 SOMATIC DYSFUNCTION OF HEAD REGION: ICD-10-CM

## 2025-05-12 DIAGNOSIS — M99.07 UPPER EXTREMITY SOMATIC DYSFUNCTION: ICD-10-CM

## 2025-05-12 DIAGNOSIS — M99.02 SOMATIC DYSFUNCTION OF THORACIC REGION: ICD-10-CM

## 2025-05-12 DIAGNOSIS — G44.059 SHORT LASTING UNILATERAL NEURALGIFORM HEADACHE WITH CONJUNCTIVAL INJECTION AND TEARING (SUNCT), NOT INTRACTABLE: Primary | ICD-10-CM

## 2025-05-12 DIAGNOSIS — M79.10 MYALGIA: ICD-10-CM

## 2025-05-12 PROCEDURE — 3078F DIAST BP <80 MM HG: CPT | Mod: CPTII,S$GLB,, | Performed by: NEUROMUSCULOSKELETAL MEDICINE & OMM

## 2025-05-12 PROCEDURE — 1159F MED LIST DOCD IN RCRD: CPT | Mod: CPTII,S$GLB,, | Performed by: NEUROMUSCULOSKELETAL MEDICINE & OMM

## 2025-05-12 PROCEDURE — 99999 PR PBB SHADOW E&M-EST. PATIENT-LVL III: CPT | Mod: PBBFAC,,, | Performed by: NEUROMUSCULOSKELETAL MEDICINE & OMM

## 2025-05-12 PROCEDURE — 1160F RVW MEDS BY RX/DR IN RCRD: CPT | Mod: CPTII,S$GLB,, | Performed by: NEUROMUSCULOSKELETAL MEDICINE & OMM

## 2025-05-12 PROCEDURE — 3074F SYST BP LT 130 MM HG: CPT | Mod: CPTII,S$GLB,, | Performed by: NEUROMUSCULOSKELETAL MEDICINE & OMM

## 2025-05-12 PROCEDURE — 99213 OFFICE O/P EST LOW 20 MIN: CPT | Mod: 25,S$GLB,, | Performed by: NEUROMUSCULOSKELETAL MEDICINE & OMM

## 2025-05-12 PROCEDURE — 98927 OSTEOPATH MANJ 5-6 REGIONS: CPT | Mod: S$GLB,,, | Performed by: NEUROMUSCULOSKELETAL MEDICINE & OMM

## 2025-05-12 NOTE — PROGRESS NOTES
Subjective:     Maricel Jha    Chief Complaint   Patient presents with    Follow-up     Head/facial pain     HPI    Maricel is a 35 y.o. female with PMHx of AIDA coming in today for follow up for right-sided headache/neck pain. Since last visit the pain has Deteriorated, with recent head pain flare that began with recent change in her headache medications due to elevated LFTs.  Patient also received her normal suboccipital nerve block and trigger point injections last Tuesday with Dr. Yeh, but this has not helped as much as it has in the past. Pt. Continues to wear  nightly. The pain is better with stretching, prior OMT type treatments, TMJ treatments, and occipital nerve blocks. Describes the pain as a 7/10 currently.      Office note from 2/24/25 reviewed    Procedures reviewed:   6/11/24- Bilateral OCCIPITAL NERVE BLOCK UNDER U/S  Dr. Yeh  7/2/24- Right OCCIPITAL NERVE BLOCK  Dr. Yeh  9/3/24- Right Greater and lesser OCCIPITAL NERVE BLOCK via anatomic guidance Dr. Yeh  11/19/24- Bilateral Greater and lesser OCCIPITAL NERVE BLOCK via anatomic guidance - Dr. Yeh  12/31/24 - Right occipital nerve block via US guidance; Right Cx paraspinal TPI - Dr. Yeh    PAST MEDICAL HISTORY: History reviewed. No pertinent past medical history.  PAST SURGICAL HISTORY: History reviewed. No pertinent surgical history.  FAMILY HISTORY: No family history on file.    SOCIAL HISTORY:   Social History     Socioeconomic History    Marital status:    Tobacco Use    Smoking status: Never    Smokeless tobacco: Never     Social Drivers of Health     Financial Resource Strain: Low Risk  (7/11/2024)    Received from [facility] [place] [facility]    Overall Financial Resource Strain (CARDIA)     Difficulty of Paying Living Expenses: Not hard at all   Food Insecurity: No Food Insecurity (7/11/2024)    Received from [facility] [place] [facility]    Hunger Vital Sign     Worried About Running Out of Food in the Last Year: Never true      Ran Out of Food in the Last Year: Never true   Transportation Needs: No Transportation Needs (7/11/2024)    Received from [facility]    PRAPARE - Transportation     Lack of Transportation (Medical): No     Lack of Transportation (Non-Medical): No   Physical Activity: Sufficiently Active (7/11/2024)    Received from [facility]    Exercise Vital Sign     Days of Exercise per Week: 3 days     Minutes of Exercise per Session: 120 min   Recent Concern: Physical Activity - Insufficiently Active (4/16/2024)    Received from [facility]    Exercise Vital Sign     Days of Exercise per Week: 2 days     Minutes of Exercise per Session: 60 min   Stress: No Stress Concern Present (7/11/2024)    Received from [facility]    [employer] - Occupational Stress Questionnaire     Feeling of Stress : Not at all   Housing Stability: Unknown (7/6/2024)    Housing Stability Vital Sign     Unable to Pay for Housing in the Last Year: No     MEDICATIONS:   Current Outpatient Medications:     AJOVY AUTOINJECTOR 225 mg/1.5 mL autoinjector, Inject into the skin., Disp: , Rfl:     cetirizine (ZYRTEC) 10 MG tablet, , Disp: , Rfl:     cholecalciferol, vitamin D3, 125 mcg (5,000 unit) capsule, , Disp: , Rfl:     desonide (DESOWEN) 0.05 % cream, Apply topically 2 (two) times daily. x 1-2 wks then prn flares only, Disp: 60 g, Rfl: 1    lamoTRIgine 100 mg TbDL, , Disp: , Rfl:     levETIRAcetam (KEPPRA) 750 MG Tab, Take 750 mg by mouth 2 (two) times daily., Disp: , Rfl:     magnesium 200 mg Tab, , Disp: , Rfl:     montelukast (SINGULAIR) 10 mg tablet, , Disp: , Rfl:     orphenadrine/aspirin/caffeine (NORGESIC FORTE ORAL), , Disp: , Rfl:     progesterone (PROMETRIUM) 100 MG capsule, Take 100 mg by mouth once daily., Disp: , Rfl:     rizatriptan (MAXALT) 10 MG tablet, Take by mouth., Disp: , Rfl:     ubrogepant (UBRELVY ORAL), Take by mouth., Disp: , Rfl:     vitamin B comp and C no.3 (B COMPLEX PLUS VITAMIN C)  15-10-50-5-300 mg Cap, , Disp: , Rfl:     carBAMazepine 100 mg/5 mL (5 mL) Susp, , Disp: , Rfl:     ALLERGIES:   Review of patient's allergies indicates:   Allergen Reactions    Nsaids (non-steroidal anti-inflammatory drug) Rash and Shortness Of Breath    Sumatriptan Other (See Comments)     Severe neck and back pain    Ibuprofen Other (See Comments)     Objective:     VITAL SIGNS: /75 (Patient Position: Sitting)   Pulse 71    General    Vitals reviewed.  Constitutional: She is oriented to person, place, and time. She appears well-developed and well-nourished.   Neurological: She is alert and oriented to person, place, and time.   Psychiatric: She has a normal mood and affect. Her behavior is normal.           MUSCULOSKELETAL EXAM:  Cervical Spine: right cervical region     Observation:    Posture:  Upright  No obvious shoulder un-leveling while standing.    No edema, erythema, or ecchymosis noted in cervical and thoraic spine regions.    No midline skin abnormalities.    No atrophy of upper limb musculature.  Gait: Non-antalgic with Neutral ankle mechanics and Pes planus. Gait without trendelenberg, heel walking, toe walking, and tandem walking.     Tenderness:  No tenderness throughout the cervical spine upon spinous process palpation  No tenderness over the base of the occiput   No bony deformities or step-offs palpated.   Mild tenderness over the posterior paraspinals in cervical spine on right  No upper trapezius tenderness      Range of Motion (* = with pain):  CERVICAL SPINE  Full AROM in flexion, extension, sidebending, and rotation.      Structural Exam:  TART (Tissue texture abnormality, Asymmetry,  Restriction of motion and/or Tenderness) changes:     Head:   Cranial Rhythmic Impulse (CRI): restricted with Decreased amplitude    Strain Patterns: absent and Left Torsion  Occipitoatlantal (OA) Joint: ES-left, R-right  Suture/Bone Restriction Side   Occipitomastoid (OM)  Present L   Parietal mastoid  (PM) Absent    Petrojugular (PJ) Absent    Sphenosquamous pivot (SSP) Present L   Zygomaticotemporal (ZT) Absent    Zygomaticofrontal (ZF) Absent    Frontonasal Absent    [place] bone Present L   Parietal bone Absent    Frontal bone Absent    Sphenopalatine ganglion present L     Muscle Tissue Texture Abnormality Side   Lateral pterygoid Present L   Medial pterygoid Present L      Cervical Spine   C1 TTA RIGHT   C2 TTA RIGHT   C3 Neutral   C4 Neutral   C5 Neutral   C6 Neutral   C7 ERS LEFT      Thoracic Spine   T1 FRS LEFT   T2 Neutral   T3 Neutral   T4 Neutral   T5 Neutral   T6 FRS RIGHT   T7 FRS RIGHT   T8 Neutral   T9 Neutral   T10 Neutral   T11 Neutral   T12 Neutral     Rib cage: R1 inhaled on right, R7 external torsion on right    Upper extremity: right thoracic outlet TTA     Key   F= Flexed   E = Extended   R = Rotated   S = Sidebent   TTA = tissue texture abnormality      Neurovascular Exam:  Spurling's - negative on left and negative on right  Sensation intact to light touch in the C5-T1 dermatomes bilaterally.   Intact and symmetric radial pulses bilaterally.    Assessment:      Encounter Diagnoses   Name Primary?    Short lasting unilateral neuralgiform headache with conjunctival injection and tearing (SUNCT), not intractable Yes    Myalgia     TMJ (temporomandibular joint disorder)     Somatic dysfunction of head region     Cervical (neck) region somatic dysfunction     Somatic dysfunction of thoracic region     Somatic dysfunction of rib cage region     Upper extremity somatic dysfunction           Plan:   1. Flair of headaches and jaw pain with underlying AIDA headaches with recent medication change as well as biomechanical restrictions of the upper kinetic chain and poor posture  - OMT performed again today to address associated biomechanical restrictions  and HEP started.   - Continue proper posture and work desk and desk accommodations to facilitate good posture.  - Continue follow up with   [facility] in pain management, discussed reconsidering sphenopalatine ganglion block if headache flairs persist  - currently followed by Dr. Gabriela BARTLETT at [facility] for TMJ. Recommend pt. Bring in bite splint to next visit for reevaluation as well  - X-ray images of cervical spine taken 5/2/24 (AP, lateral, bilateral obliques, and odontoid  flexion and extension views) showed No acute osseous abnormality seen. Straightening of the normal cervical lordosis which may be positional or related to muscle spasm.      2. OMT 5-6 regions. Oral consent obtained.  Reviewed benefits and potential side effects.   - OMT indicated today due to signs and symptoms as well as local and remote somatic dysfunction findings and their related neurokinetic, lymphatic, fascial and/or arteriovenous body connections.   - OMT techniques used: Myofascial Release, Muscle Energy, still's technique and Cranial    - Treatment was tolerated well. Improvement noted in segmental mobility post-treatment in dysfunctional regions. There were no adverse events and no complications immediately following treatment.      3.  Follow-up in 2 weeks for reevaluation     4. Patient agreeable to today's plan and all questions were answered     This note is dictated using the M*Modal Fluency Direct word recognition program. There are word recognition mistakes that are occasionally missed on review.

## 2025-05-15 ENCOUNTER — OFFICE VISIT (OUTPATIENT)
Dept: SLEEP MEDICINE | Facility: CLINIC | Age: 35
End: 2025-05-15
Payer: COMMERCIAL

## 2025-05-15 ENCOUNTER — PATIENT MESSAGE (OUTPATIENT)
Dept: SLEEP MEDICINE | Facility: CLINIC | Age: 35
End: 2025-05-15

## 2025-05-15 VITALS
WEIGHT: 183.44 LBS | SYSTOLIC BLOOD PRESSURE: 111 MMHG | HEART RATE: 65 BPM | BODY MASS INDEX: 29.48 KG/M2 | HEIGHT: 66 IN | DIASTOLIC BLOOD PRESSURE: 70 MMHG

## 2025-05-15 DIAGNOSIS — G47.33 OBSTRUCTIVE SLEEP APNEA: Primary | ICD-10-CM

## 2025-05-15 PROCEDURE — 1160F RVW MEDS BY RX/DR IN RCRD: CPT | Mod: CPTII,S$GLB,, | Performed by: NURSE PRACTITIONER

## 2025-05-15 PROCEDURE — 1159F MED LIST DOCD IN RCRD: CPT | Mod: CPTII,S$GLB,, | Performed by: NURSE PRACTITIONER

## 2025-05-15 PROCEDURE — 99214 OFFICE O/P EST MOD 30 MIN: CPT | Mod: S$GLB,,, | Performed by: NURSE PRACTITIONER

## 2025-05-15 PROCEDURE — 3074F SYST BP LT 130 MM HG: CPT | Mod: CPTII,S$GLB,, | Performed by: NURSE PRACTITIONER

## 2025-05-15 PROCEDURE — 3008F BODY MASS INDEX DOCD: CPT | Mod: CPTII,S$GLB,, | Performed by: NURSE PRACTITIONER

## 2025-05-15 PROCEDURE — 3078F DIAST BP <80 MM HG: CPT | Mod: CPTII,S$GLB,, | Performed by: NURSE PRACTITIONER

## 2025-05-15 PROCEDURE — 99999 PR PBB SHADOW E&M-EST. PATIENT-LVL III: CPT | Mod: PBBFAC,,, | Performed by: NURSE PRACTITIONER

## 2025-05-15 NOTE — PROGRESS NOTES
"Referred by No ref. provider found     NEW PATIENT VISIT    Maricel Jha  is a pleasant 35 y.o. female who presents in the Spring of 2025 for sleep evaluation.    See assessment below for further history.    History reviewed. No pertinent past medical history.Problem List[1]Current Medications[2]      Vitals:    05/15/25 1408   BP: 111/70   BP Location: Right arm   Patient Position: Sitting   Pulse: 65   Weight: 83.2 kg (183 lb 6.8 oz)   Height: 5' 6" (1.676 m)     Physical Exam:    GEN:   Well-appearing  Psych:  Appropriate affect, demonstrates insight  SKIN:  No rash on the face or bridge of the nose      LABS:   No results found for: "CO2"      No echocardiogram results found for the past 12 months     No results found for: "FERRITIN"    RECORDS REVIEWED:      ASSESSMENT    Sig PMH: Trigeminal autonomic cephalagia, TMJ  PROBLEM DESCRIPTION/ Sx on Presentation Interval Hx STATUS PLAN     Mild JENNY   Presentation:     Has been recommended to get a sleep study in light of her complex history.    Both parents have JENNY.    no - snoring  no - gasping arousals/coughing  no - witnessed apneas, pauses in breathing    no - night sweats    yes - AM headaches    no - dry mouth/sore throat   Today:    Here to discuss sleep study results and treatment options.    HST 4.5.25: AHI 1, RDI 11    The home sleep studies were difficult and uncomfortable d/t her h/o trigeminal neurologia. Her first was canceled d/t inadequate sleep time recorded and she had to repeat it. The AISHWARYA strapped to her head was very uncomfortable.    She was not sure if she wanted to proceed with CPAP based on the results.  She was referred for PSG for more definitive results, but insurance denied it.    Discussed options and is amenable to PAP trial, though the mask/headgear might also cause discomfort.    She reports teeth-grinding and wears a mouthguard.  She will confer with her dentist to see she is a good candidate for an oral appliance.   " "  uncontrolled   -Amenable to PAP trial    -machine and supplies ordered       Daytime symptoms       Does not feel refreshed when waking up in the morning. Feels excessively sleepy during the day and/or periods of rest.     ESS 2/24 on intake    persists   -will reassess sleepiness after JENNY is adequately controlled     Insomnia     Has noticed for the past 2-3 years that sleep is not always refreshing.  She is also waking up "clockwork" at least twice a night, unexplainable.    no - difficulty with sleep onset    yes - difficulty with sleep maintenance    SLEEP SCHEDULE   Duration    Wind- down    Envmnt    CBTi    Meds prior    Meds now    Bed Time 11PM-1AM   Lights out    Latency 30 minutes - 1hr   Arousals 2   Back to sleep 5 min   Stim. ctrl    Wake time 930-11AM (work)  11AM-12PM (off)   Caffeine    Naps 0   Nocturia 2   Work                  persists   -will reassess sleepiness after JENNY is adequately controlled       Nocturia     x 2 per sleep period    stable          RTC:  31-90 days after receiving new machine          [1]   Patient Active Problem List  Diagnosis    Decreased strength of lower extremity    Chronic fatigue   [2]   Current Outpatient Medications:     AJOVY AUTOINJECTOR 225 mg/1.5 mL autoinjector, Inject into the skin., Disp: , Rfl:     cetirizine (ZYRTEC) 10 MG tablet, , Disp: , Rfl:     cholecalciferol, vitamin D3, 125 mcg (5,000 unit) capsule, , Disp: , Rfl:     desonide (DESOWEN) 0.05 % cream, Apply topically 2 (two) times daily. x 1-2 wks then prn flares only, Disp: 60 g, Rfl: 1    lamoTRIgine 100 mg TbDL, , Disp: , Rfl:     levETIRAcetam (KEPPRA) 750 MG Tab, Take 750 mg by mouth 2 (two) times daily., Disp: , Rfl:     magnesium 200 mg Tab, , Disp: , Rfl:     montelukast (SINGULAIR) 10 mg tablet, , Disp: , Rfl:     orphenadrine/aspirin/caffeine (NORGESIC FORTE ORAL), , Disp: , Rfl:     progesterone (PROMETRIUM) 100 MG capsule, Take 100 mg by mouth once daily., Disp: , Rfl:     " rizatriptan (MAXALT) 10 MG tablet, Take by mouth., Disp: , Rfl:     ubrogepant (UBRELVY ORAL), Take by mouth., Disp: , Rfl:     vitamin B comp and C no.3 (B COMPLEX PLUS VITAMIN C) 15-10-50-5-300 mg Cap, , Disp: , Rfl:     carBAMazepine 100 mg/5 mL (5 mL) Susp, , Disp: , Rfl:

## 2025-05-19 DIAGNOSIS — G47.19 OTHER HYPERSOMNIA: ICD-10-CM

## 2025-05-19 DIAGNOSIS — G47.33 OBSTRUCTIVE SLEEP APNEA: Primary | ICD-10-CM

## 2025-05-19 DIAGNOSIS — F51.01 PRIMARY INSOMNIA: ICD-10-CM

## 2025-05-21 ENCOUNTER — PATIENT MESSAGE (OUTPATIENT)
Dept: SLEEP MEDICINE | Facility: CLINIC | Age: 35
End: 2025-05-21
Payer: COMMERCIAL

## 2025-05-26 ENCOUNTER — TELEPHONE (OUTPATIENT)
Dept: SPORTS MEDICINE | Facility: CLINIC | Age: 35
End: 2025-05-26
Payer: COMMERCIAL

## 2025-05-26 NOTE — TELEPHONE ENCOUNTER
Called and left a message for pt that the internet is out at the [facility] location and seeing if she can come to [place]

## 2025-05-29 ENCOUNTER — OFFICE VISIT (OUTPATIENT)
Dept: SPORTS MEDICINE | Facility: CLINIC | Age: 35
End: 2025-05-29
Payer: COMMERCIAL

## 2025-05-29 VITALS — SYSTOLIC BLOOD PRESSURE: 107 MMHG | DIASTOLIC BLOOD PRESSURE: 67 MMHG

## 2025-05-29 DIAGNOSIS — M99.08 SOMATIC DYSFUNCTION OF RIB CAGE REGION: ICD-10-CM

## 2025-05-29 DIAGNOSIS — M99.00 SOMATIC DYSFUNCTION OF HEAD REGION: ICD-10-CM

## 2025-05-29 DIAGNOSIS — M99.02 SOMATIC DYSFUNCTION OF THORACIC REGION: ICD-10-CM

## 2025-05-29 DIAGNOSIS — M99.01 CERVICAL (NECK) REGION SOMATIC DYSFUNCTION: ICD-10-CM

## 2025-05-29 DIAGNOSIS — M79.10 MYALGIA: ICD-10-CM

## 2025-05-29 DIAGNOSIS — M99.07 UPPER EXTREMITY SOMATIC DYSFUNCTION: ICD-10-CM

## 2025-05-29 DIAGNOSIS — G44.059 SHORT LASTING UNILATERAL NEURALGIFORM HEADACHE WITH CONJUNCTIVAL INJECTION AND TEARING (SUNCT), NOT INTRACTABLE: Primary | ICD-10-CM

## 2025-05-29 DIAGNOSIS — M26.609 TMJ (TEMPOROMANDIBULAR JOINT DISORDER): ICD-10-CM

## 2025-05-29 PROCEDURE — 99213 OFFICE O/P EST LOW 20 MIN: CPT | Mod: 25,S$GLB,, | Performed by: NEUROMUSCULOSKELETAL MEDICINE & OMM

## 2025-05-29 PROCEDURE — 98927 OSTEOPATH MANJ 5-6 REGIONS: CPT | Mod: S$GLB,,, | Performed by: NEUROMUSCULOSKELETAL MEDICINE & OMM

## 2025-05-29 PROCEDURE — 99999 PR PBB SHADOW E&M-EST. PATIENT-LVL III: CPT | Mod: PBBFAC,,, | Performed by: NEUROMUSCULOSKELETAL MEDICINE & OMM

## 2025-05-29 PROCEDURE — 3074F SYST BP LT 130 MM HG: CPT | Mod: CPTII,S$GLB,, | Performed by: NEUROMUSCULOSKELETAL MEDICINE & OMM

## 2025-05-29 PROCEDURE — 1159F MED LIST DOCD IN RCRD: CPT | Mod: CPTII,S$GLB,, | Performed by: NEUROMUSCULOSKELETAL MEDICINE & OMM

## 2025-05-29 PROCEDURE — 1160F RVW MEDS BY RX/DR IN RCRD: CPT | Mod: CPTII,S$GLB,, | Performed by: NEUROMUSCULOSKELETAL MEDICINE & OMM

## 2025-05-29 PROCEDURE — 3078F DIAST BP <80 MM HG: CPT | Mod: CPTII,S$GLB,, | Performed by: NEUROMUSCULOSKELETAL MEDICINE & OMM

## 2025-05-29 NOTE — PROGRESS NOTES
"  Subjective:     Maricel Jha    Chief Complaint   Patient presents with    Pain     Head/facial pain     HPI    Maricel is a 35 y.o. female with PMHx of AIDA coming in today for follow up for right-sided headache/neck pain. Since last visit the pain has Improved, still slightly above her "baseline". Pt. Continues to wear  nightly. The pain is better with stretching, prior OMT type treatments, TMJ treatments, and occipital nerve blocks. Describes the pain as a 5/10 currently.      Office note from 5/12/25 reviewed    Procedures reviewed:   6/11/24- Bilateral OCCIPITAL NERVE BLOCK UNDER U/S  Dr. Yeh  7/2/24- Right OCCIPITAL NERVE BLOCK  Dr. Yeh  9/3/24- Right Greater and lesser OCCIPITAL NERVE BLOCK via anatomic guidance Dr. Yeh  11/19/24- Bilateral Greater and lesser OCCIPITAL NERVE BLOCK via anatomic guidance - Dr. Yeh  12/31/24 - Right occipital nerve block via US guidance; Right Cx paraspinal TPI - Dr. Yeh    PAST MEDICAL HISTORY: History reviewed. No pertinent past medical history.  PAST SURGICAL HISTORY: History reviewed. No pertinent surgical history.  FAMILY HISTORY: No family history on file.    SOCIAL HISTORY:   Social History     Socioeconomic History    Marital status:    Tobacco Use    Smoking status: Never    Smokeless tobacco: Never     Social Drivers of Health     Financial Resource Strain: Low Risk  (7/11/2024)    Received from [facility]    Overall Financial Resource Strain (CARDIA)     Difficulty of Paying Living Expenses: Not hard at all   Food Insecurity: No Food Insecurity (7/11/2024)    Received from [facility]    Hunger Vital Sign     Worried About Running Out of Food in the Last Year: Never true     Ran Out of Food in the Last Year: Never true   Transportation Needs: No Transportation Needs (7/11/2024)    Received from [facility]    PRAPARE - Transportation     Lack of Transportation (Medical): No     Lack of Transportation (Non-Medical): No "   Physical Activity: Sufficiently Active (7/11/2024)    [facility] [place] [facility]    Exercise Vital Sign     Days of Exercise per Week: 3 days     Minutes of Exercise per Session: 120 min   Recent Concern: Physical Activity - Insufficiently Active (4/16/2024)    Received from [facility]    Exercise Vital Sign     Days of Exercise per Week: 2 days     Minutes of Exercise per Session: 60 min   Stress: No Stress Concern Present (7/11/2024)    Received from [facility]    [employer] - Occupational Stress Questionnaire     Feeling of Stress : Not at all   Housing Stability: Unknown (7/6/2024)    Housing Stability Vital Sign     Unable to Pay for Housing in the Last Year: No     MEDICATIONS:   Current Outpatient Medications:     AJOVY AUTOINJECTOR 225 mg/1.5 mL autoinjector, Inject into the skin., Disp: , Rfl:     carBAMazepine 100 mg/5 mL (5 mL) Susp, , Disp: , Rfl:     cetirizine (ZYRTEC) 10 MG tablet, , Disp: , Rfl:     cholecalciferol, vitamin D3, 125 mcg (5,000 unit) capsule, , Disp: , Rfl:     desonide (DESOWEN) 0.05 % cream, Apply topically 2 (two) times daily. x 1-2 wks then prn flares only, Disp: 60 g, Rfl: 1    lamoTRIgine 100 mg TbDL, , Disp: , Rfl:     levETIRAcetam (KEPPRA) 750 MG Tab, Take 750 mg by mouth 2 (two) times daily., Disp: , Rfl:     magnesium 200 mg Tab, , Disp: , Rfl:     montelukast (SINGULAIR) 10 mg tablet, , Disp: , Rfl:     orphenadrine/aspirin/caffeine (NORGESIC FORTE ORAL), , Disp: , Rfl:     progesterone (PROMETRIUM) 100 MG capsule, Take 100 mg by mouth once daily., Disp: , Rfl:     rizatriptan (MAXALT) 10 MG tablet, Take by mouth., Disp: , Rfl:     ubrogepant (UBRELVY ORAL), Take by mouth., Disp: , Rfl:     vitamin B comp and C no.3 (B COMPLEX PLUS VITAMIN C) 15-10-50-5-300 mg Cap, , Disp: , Rfl:     ALLERGIES:   Review of patient's allergies indicates:   Allergen Reactions    Nsaids (non-steroidal anti-inflammatory drug) Rash and Shortness Of Breath     Sumatriptan Other (See Comments)     Severe neck and back pain    Ibuprofen Other (See Comments)     Objective:     VITAL SIGNS: /67 (Patient Position: Sitting)   Pulse (P) 69    General    Vitals reviewed.  Constitutional: She is oriented to person, place, and time. She appears well-developed and well-nourished.   Neurological: She is alert and oriented to person, place, and time.   Psychiatric: She has a normal mood and affect. Her behavior is normal.           MUSCULOSKELETAL EXAM:  Cervical Spine: right cervical region     Observation:    Posture:  Upright  No obvious shoulder un-leveling while standing.    No edema, erythema, or ecchymosis noted in cervical and thoraic spine regions.    No midline skin abnormalities.    No atrophy of upper limb musculature.  Gait: Non-antalgic with Neutral ankle mechanics and Pes planus. Gait without trendelenberg, heel walking, toe walking, and tandem walking.     Tenderness:  No tenderness throughout the cervical spine upon spinous process palpation  No tenderness over the base of the occiput   No bony deformities or step-offs palpated.   Mild tenderness over the posterior paraspinals in cervical spine on right  No upper trapezius tenderness      Range of Motion (* = with pain):  CERVICAL SPINE  Full AROM in flexion, extension, sidebending, and rotation.      Structural Exam:  TART (Tissue texture abnormality, Asymmetry,  Restriction of motion and/or Tenderness) changes:     Head:   Cranial Rhythmic Impulse (CRI): restricted with Decreased amplitude    Strain Patterns: absent  Occipitoatlantal (OA) Joint: ES-left, R-right  Suture/Bone Restriction Side   Occipitomastoid (OM)  Present L   Parietal mastoid (PM) Absent    Petrojugular (PJ) Absent    Sphenosquamous pivot (SSP) Absent    Zygomaticotemporal (ZT) Absent    Zygomaticofrontal (ZF) Absent    Frontonasal Absent    [place] bone Absent    Parietal bone Absent    Frontal bone Absent    Sphenopalatine ganglion absent       Muscle Tissue Texture Abnormality Side   Lateral pterygoid Absent    Medial pterygoid Absent       Cervical Spine   C1 TTA RIGHT   C2 TTA RIGHT   C3 Neutral   C4 Neutral   C5 Neutral   C6 Neutral   C7 Neutral      Thoracic Spine   T1 TTA RIGHT   T2 TTA RIGHT   T3 Neutral   T4 Neutral   T5 Neutral   T6 Neutral   T7 FRS RIGHT   T8 Neutral   T9 Neutral   T10 Neutral   T11 Neutral   T12 Neutral     Rib cage: R1 inhaled on right, R7 external torsion on right    Upper extremity: right thoracic outlet TTA     Key   F= Flexed   E = Extended   R = Rotated   S = Sidebent   TTA = tissue texture abnormality      Neurovascular Exam:  Spurling's - negative on left and negative on right  Sensation intact to light touch in the C5-T1 dermatomes bilaterally.   Intact and symmetric radial pulses bilaterally.    Assessment:      Encounter Diagnoses   Name Primary?    Short lasting unilateral neuralgiform headache with conjunctival injection and tearing (SUNCT), not intractable Yes    TMJ (temporomandibular joint disorder)     Myalgia     Somatic dysfunction of head region     Cervical (neck) region somatic dysfunction     Somatic dysfunction of rib cage region     Somatic dysfunction of thoracic region     Upper extremity somatic dysfunction             Plan:   1. Flair of headaches and jaw pain with underlying AIDA headaches with recent medication change as well as biomechanical restrictions of the upper kinetic chain and poor posture- improved  - OMT performed again today to address associated biomechanical restrictions  and HEP started.   - Continue proper posture and work desk and desk accommodations to facilitate good posture.  - Continue follow up with Dr. Yeh in pain management  - recommend restarting bite splint wear nightly, now that pain flare has subsided  - X-ray images of cervical spine taken 5/2/24 (AP, lateral, bilateral obliques, and odontoid  flexion and extension views) showed No acute osseous abnormality  seen. Straightening of the normal cervical lordosis which may be positional or related to muscle spasm.      2. OMT 5-6 regions. Oral consent obtained.  Reviewed benefits and potential side effects.   - OMT indicated today due to signs and symptoms as well as local and remote somatic dysfunction findings and their related neurokinetic, lymphatic, fascial and/or arteriovenous body connections.   - OMT techniques used: Myofascial Release, Muscle Energy, still's technique and Cranial    - Treatment was tolerated well. Improvement noted in segmental mobility post-treatment in dysfunctional regions. There were no adverse events and no complications immediately following treatment.      3.  Reviewed with pt. the following HEP:  A) Cervicothoracic junction mobilization exercise: 10-15 reps, twice daily. Handout also given.   B)  Supine Thoracic extension exercise: 10-15 reps, twice daily. Handout also given.   C) Sidelying reach and roll stretch: Laying on your side with arms extend out, pull back top arm and flex at the elbow, then rotate thoracic spine and open up arm and chest. Repeat for a total of 10 reps on each side, twice daily. Hand-out also given.   D) Supine chin tuck exercise: 10-15 reps, twice daily - avoiding over-firing of SCM and platysma musculature. Once supine position mastered, can progress to seated positioning. Handout given.      The patient was taught a homegoing physical therapy regimen as described above. The patient demonstrated understanding of the exercises and proper technique of their execution.     4. Follow-up as needed if pain deteriorates or new issue arises     5. Patient agreeable to today's plan and all questions were answered     This note is dictated using the M*Modal Fluency Direct word recognition program. There are word recognition mistakes that are occasionally missed on review.

## 2025-06-09 ENCOUNTER — OFFICE VISIT (OUTPATIENT)
Dept: PAIN MEDICINE | Facility: CLINIC | Age: 35
End: 2025-06-09
Payer: COMMERCIAL

## 2025-06-09 VITALS
SYSTOLIC BLOOD PRESSURE: 114 MMHG | DIASTOLIC BLOOD PRESSURE: 68 MMHG | RESPIRATION RATE: 18 BRPM | HEIGHT: 66 IN | OXYGEN SATURATION: 100 % | BODY MASS INDEX: 27.99 KG/M2 | WEIGHT: 174.19 LBS | TEMPERATURE: 98 F | HEART RATE: 73 BPM

## 2025-06-09 DIAGNOSIS — M54.81 OCCIPITAL NEURALGIA, UNSPECIFIED LATERALITY: ICD-10-CM

## 2025-06-09 DIAGNOSIS — M79.18 MYOFASCIAL PAIN: ICD-10-CM

## 2025-06-09 DIAGNOSIS — M54.81 OCCIPITAL NEURALGIA OF RIGHT SIDE: Primary | ICD-10-CM

## 2025-06-09 PROCEDURE — 99214 OFFICE O/P EST MOD 30 MIN: CPT | Mod: S$GLB,,, | Performed by: ANESTHESIOLOGY

## 2025-06-09 PROCEDURE — 1159F MED LIST DOCD IN RCRD: CPT | Mod: CPTII,S$GLB,, | Performed by: ANESTHESIOLOGY

## 2025-06-09 PROCEDURE — 1160F RVW MEDS BY RX/DR IN RCRD: CPT | Mod: CPTII,S$GLB,, | Performed by: ANESTHESIOLOGY

## 2025-06-09 PROCEDURE — 99999 PR PBB SHADOW E&M-EST. PATIENT-LVL IV: CPT | Mod: PBBFAC,,, | Performed by: ANESTHESIOLOGY

## 2025-06-09 PROCEDURE — G2211 COMPLEX E/M VISIT ADD ON: HCPCS | Mod: S$GLB,,, | Performed by: ANESTHESIOLOGY

## 2025-06-09 PROCEDURE — 3008F BODY MASS INDEX DOCD: CPT | Mod: CPTII,S$GLB,, | Performed by: ANESTHESIOLOGY

## 2025-06-09 PROCEDURE — 3074F SYST BP LT 130 MM HG: CPT | Mod: CPTII,S$GLB,, | Performed by: ANESTHESIOLOGY

## 2025-06-09 PROCEDURE — 3078F DIAST BP <80 MM HG: CPT | Mod: CPTII,S$GLB,, | Performed by: ANESTHESIOLOGY

## 2025-06-09 NOTE — PROGRESS NOTES
Established Visit    PCP: Alexa Angeles DO      CHIEF COMPLAINT: Headaches      Interval History 6/9/2025:  Maricel Jha is a 35 y.o. female who presents to the clinic for follow up evaluation and management of chronic pain comlpaints, including chronic headaches, AIDA. Last seen in clinic on 5/6/25 for the right occipital nerve block and R cervical muscle TPI. Since the block she reports feeling good, and better than she has felt in a long time. She has been doing the injections for a year and says 70% of the injections are helpful. She is here to schedule her next injections. She has stopped needing memantine and is now taking less carbamazepine. The blocks have been working and she is working with her neurologist to start titration off some of her medications.     Interval History 2/12/2025:  Patient is here for follow up S/P right occipital nerve block and right cervical trigger point injection on 12/31/2024 with more than 50% pain relief that last until today. She reports no adverse effects from the procedure.     Original HISTORY OF PRESENT ILLNESS: Maricel Jha presents to the clinic for the evaluation of the above pain. The pain started 3 years ago.     Original Pain Description:  The patient is a 34 year old female with pmh of AIDA who presents for evaluation and treatment of headaches. The headaches started 3 years ago with no inciting factor. The pain is right sided and is located in the upper neck/base of the skull and spread towards the right side of the face. Pain in the back is described as a soreness, pain in the front is more of a stabbing and searing pain.  Often she will get episodes of tearing and facial flushing. Exacerbating factors: Stress, wind, brushing teeth, brushing hair. Mitigating factors: medication. Symptoms interfere with work and quality of life. The patient feels like symptoms have  been unchanged. No photophobia, nausea, vomiting, auras.     She recently moved from [place] to [place]. She see's a neurologist in [place] who researches AIDA and is happy with her care there. Her neurologist over there manages her medications. She gets cervical trigger point injections and greater occipital nerve blocks about every 6-8 weeks. These give her about 75% relief for 4 weeks. Steroids are added about every other injection. She has had 6 injections so far, last one was earlier this week. Overall, she is happy with her treatment and wants to continue as is for now.     Original PAIN SCORES:  Best: Pain is 2  Worst: Pain is 9  Current: Pain is 4        6/9/2025     2:46 PM   Last 3 PDI Scores   Pain Disability Index (PDI) 7     INTERVAL HISTORY: (Newest visit at the bottom)   Interval History (7/1/2024):   34 year old female returns in follow up for occipital neuralgia. Patient had bilateral occipital nerve blocks on 6/17/2024. Patient reports no benefit. She localizes pain to the right occipital prominence. Typically got good benefit with landmark guided procedure. No benefit with US guided procedure. Patient still follows with her [place] Neurologist via virtual visits.  Patient denies recent falls, trauma, hospitalizations, or infections.     Interval History 8/8/2024:   Patient following up for occipital neuralgia. Patient said occipital nerve block every 6 weeks usually helps and would like to be scheduled to have the procedure done. Her scheduled right sphenopalatine block procedure was cancelled due to high co pays. Patient said her headaches and neck pain is not controlled with her current medication regimen. Her neurologist added on memantine to her current medication. Her pain today is 2/10. Denied fevers, vomiting, visual changes or difficulties with balance or ambulation.     Interval History 12/20/2024  Patient with hx of AIDA follow up after recent bilateral ONB under US with steroids.   She reports limited improvement after this injection, last only a few days.  In contrast, previously she received 6-8 weeks of pain relief.  Recently started on Keppra.  Takes Norgesic PRN for pain.  Denies vision changes.  Pain today 7/10, on right side.    6 weeks of Conservative therapy:  PT: n/a   HEP: n/a    Treatments / Medications: (Ice/Heat/NSAIDS/APAP/etc):  Carbamazepine 400 mg (decreased dosage)  Ajovy  Maxalt  Ubrelvy  Lamotrigine    Interventional Pain Procedures:   Right Occipital nerve blocks   12/31/2024: Right occipital nerve block and TPI with more than 50% pain relief.       History reviewed. No pertinent past medical history.  History reviewed. No pertinent surgical history.  Social History     Socioeconomic History    Marital status:    Tobacco Use    Smoking status: Never    Smokeless tobacco: Never     Social Drivers of Health     Financial Resource Strain: Low Risk  (7/11/2024)    Received from [facility]    Overall Financial Resource Strain (CARDIA)     Difficulty of Paying Living Expenses: Not hard at all   Food Insecurity: No Food Insecurity (7/11/2024)    Received from [facility]    Hunger Vital Sign     Worried About Running Out of Food in the Last Year: Never true     Ran Out of Food in the Last Year: Never true   Transportation Needs: No Transportation Needs (7/11/2024)    Received from [facility]    PRAPARE - Transportation     Lack of Transportation (Medical): No     Lack of Transportation (Non-Medical): No   Physical Activity: Sufficiently Active (7/11/2024)    Received from [facility]    Exercise Vital Sign     Days of Exercise per Week: 3 days     Minutes of Exercise per Session: 120 min   Recent Concern: Physical Activity - Insufficiently Active (4/16/2024)    Received from [facility]    Exercise Vital Sign     Days of Exercise per Week: 2 days     Minutes of Exercise per Session: 60 min   Stress: No Stress Concern Present (7/11/2024)    Received from [facility]     [facility] [place] [facility] - Occupational Stress Questionnaire     Feeling of Stress : Not at all   Housing Stability: Unknown (7/6/2024)    Housing Stability Vital Sign     Unable to Pay for Housing in the Last Year: No     No family history on file.    Review of patient's allergies indicates:   Allergen Reactions    Nsaids (non-steroidal anti-inflammatory drug) Rash and Shortness Of Breath    Sumatriptan Other (See Comments)     Severe neck and back pain    Ibuprofen Other (See Comments)       Current Outpatient Medications   Medication Sig    AJOVY AUTOINJECTOR 225 mg/1.5 mL autoinjector Inject into the skin.    carBAMazepine 100 mg/5 mL (5 mL) Susp     cetirizine (ZYRTEC) 10 MG tablet     cholecalciferol, vitamin D3, 125 mcg (5,000 unit) capsule     desonide (DESOWEN) 0.05 % cream Apply topically 2 (two) times daily. x 1-2 wks then prn flares only    lamoTRIgine 100 mg TbDL     levETIRAcetam (KEPPRA) 750 MG Tab Take 750 mg by mouth 2 (two) times daily.    magnesium 200 mg Tab     montelukast (SINGULAIR) 10 mg tablet     orphenadrine/aspirin/caffeine (NORGESIC FORTE ORAL)     progesterone (PROMETRIUM) 100 MG capsule Take 100 mg by mouth once daily.    rizatriptan (MAXALT) 10 MG tablet Take by mouth.    ubrogepant (UBRELVY ORAL) Take by mouth.    vitamin B comp and C no.3 (B COMPLEX PLUS VITAMIN C) 15-10-50-5-300 mg Cap      No current facility-administered medications for this visit.     ROS:  GENERAL: No fever. No chills. No fatigue.   HEENT: + tearing, denies vision changes  CV: Denies chest pain.   PULM: Denies of shortness of breath.  GI: Denies constipation. No diarrhea. No abdominal pain. Denies nausea. Denies vomiting. No blood in stool.  HEME: Denies bleeding problems.  : Denies urgency. No painful urination. No blood in urine.  MS: + neck pain  SKIN: Denies rash.   NEURO: + headaches, no weakness  PSYCH:  Denies difficulty sleeping. No anxiety. Denies depression. No suicidal thoughts.  "    VITALS:   Vitals:    06/09/25 1445   BP: 114/68   Pulse: 73   Resp: 18   Temp: 98 °F (36.7 °C)   TempSrc: Oral   SpO2: 100%   Weight: 79 kg (174 lb 2.6 oz)   Height: 5' 6" (1.676 m)   PainSc: 1    PainLoc: Head       PHYSICAL EXAM:   GENERAL: Well appearing, in no acute distress, alert and oriented x3.  PSYCH:  Mood and affect appropriate.  SKIN: Skin color, texture, turgor normal, no rashes or lesions.  HEENT:  Normocephalic, atraumatic. Cranial nerves grossly intact. No tenderness to palpation over the cervical paraspinal muscles and occipital ridge bilaterally.   Full ROM with flexion, extension, lateral rotation. Lhermitte's and Spurling's are negative  PULM: No evidence of respiratory difficulty, symmetric chest rise.  GI:  Non-distended  EXTREMITIES: No deformities, edema, or skin discoloration.   NEURO: CN II-XII are intact. Sensation is equal and appropriate bilaterally. Bilateral upper and lower extremity strength is normal and symmetric. Bilateral upper and lower extremity coordination and muscle stretch reflexes are physiologic and symmetric. Plantar response are downgoing. Negative Hoffmans.No clonus  GAIT: normal.    IMAGING:    EXAM: Brain w/wo contrast MRI     DATE: 5/29/2021 11:00     INDICATION:  - concern for brainstem MS/infalmmatory/demyelinating condition, patient with trigeminal pain/redness x4 weeks. please thin cuts brain stem.     COMPARISON: [facility] 5/29/2021     TECHNIQUE: Multiplanar multisequence images of the brain were obtained before and after the intravenous administration of contrast material.     DISCUSSION:     No acute parenchymal abnormality.   No hydrocephalus, midline shift, mass effect, restricted diffusion or acute hemorrhage.   Pineal region, pituitary gland, cranio cervical junction, orbits and internal auditory canals are unremarkable.   Major intracranial flow-voids are well-maintained.   No mass or abnormal enhancement. The cisternal segments of the trigeminal nerves " are unremarkable with no abnormal enhancement.   No osseous lesions.     IMPRESSION:     Unremarkable MRI of the brain with and without contrast.     ASSESSMENT: 35 y.o. year old female with pain, consistent with:    Encounter Diagnoses   Name Primary?    Occipital neuralgia, unspecified laterality     Myofascial pain     Occipital neuralgia of right side Yes           DISCUSSION: Maricel Jha is aA 34-year-old patient with short-lasting unilateral neuralgiform headache attacks with autonomic symptoms (AIDA) and occipital neuralgia. She has experienced significant symptom relief with her current medication regimen, cervical trigger point injections (TPI), and occipital nerve blocks. She is satisfied with her current treatment plan.      Plan:  Procedures: Schedule for an in-office right sided cervical TPI with occipital nerve block.   Imaging: Previous imaging reviewed and discussed with the patient today.  Neurology Follow-up: Encourage continued follow-up with her neurologist. She is in the process of weaning medications due to elevations in LFTs.  Medications: Continue current medication regimen as prescribed by her neurologist.  Follow-up: Return to clinic (RTC) for repeat injection (week of the 30th).      Mk Ventura MD  [facility] Resident        I spent a total of 30 minutes on the day of the visit.  This includes face to face time and non-face to face time preparing to see the patient by reviewing previous labs/imaging, obtaining and/or reviewing separately obtained history, documenting clinical information in the electronic or other health record, independently interpreting results and communicating results to the patient/family/caregiver. This note was generated with the assistance of ambient listening technology. Verbal consent was obtained by the patient and accompanying visitor(s) for the recording of patient appointment to facilitate this note. I attest to having reviewed and  edited the generated note for accuracy, though some syntax or spelling errors may persist. Please contact the author of this note for any clarification.       Osman Yeh

## 2025-06-11 ENCOUNTER — TELEPHONE (OUTPATIENT)
Dept: PAIN MEDICINE | Facility: CLINIC | Age: 35
End: 2025-06-11
Payer: COMMERCIAL

## 2025-06-11 DIAGNOSIS — M54.81 OCCIPITAL NEURALGIA OF RIGHT SIDE: Primary | ICD-10-CM

## 2025-06-18 ENCOUNTER — PATIENT MESSAGE (OUTPATIENT)
Dept: PAIN MEDICINE | Facility: CLINIC | Age: 35
End: 2025-06-18
Payer: COMMERCIAL

## 2025-07-01 ENCOUNTER — OFFICE VISIT (OUTPATIENT)
Dept: OBSTETRICS AND GYNECOLOGY | Facility: CLINIC | Age: 35
End: 2025-07-01
Payer: COMMERCIAL

## 2025-07-01 VITALS
DIASTOLIC BLOOD PRESSURE: 70 MMHG | WEIGHT: 175.81 LBS | BODY MASS INDEX: 28.38 KG/M2 | SYSTOLIC BLOOD PRESSURE: 116 MMHG

## 2025-07-01 DIAGNOSIS — B37.31 CANDIDAL VAGINITIS: Primary | ICD-10-CM

## 2025-07-01 DIAGNOSIS — N94.6 DYSMENORRHEA: ICD-10-CM

## 2025-07-01 DIAGNOSIS — N94.6 DYSMENORRHEA: Primary | ICD-10-CM

## 2025-07-01 DIAGNOSIS — B37.31 CANDIDAL VAGINITIS: ICD-10-CM

## 2025-07-01 DIAGNOSIS — N76.0 RECURRENT VAGINITIS: ICD-10-CM

## 2025-07-01 DIAGNOSIS — Z11.3 SCREENING FOR STD (SEXUALLY TRANSMITTED DISEASE): ICD-10-CM

## 2025-07-01 PROCEDURE — 99999 PR PBB SHADOW E&M-EST. PATIENT-LVL II: CPT | Mod: PBBFAC,,, | Performed by: OBSTETRICS & GYNECOLOGY

## 2025-07-01 PROCEDURE — 3008F BODY MASS INDEX DOCD: CPT | Mod: CPTII,S$GLB,, | Performed by: OBSTETRICS & GYNECOLOGY

## 2025-07-01 PROCEDURE — 1159F MED LIST DOCD IN RCRD: CPT | Mod: CPTII,S$GLB,, | Performed by: OBSTETRICS & GYNECOLOGY

## 2025-07-01 PROCEDURE — 3074F SYST BP LT 130 MM HG: CPT | Mod: CPTII,S$GLB,, | Performed by: OBSTETRICS & GYNECOLOGY

## 2025-07-01 PROCEDURE — 99204 OFFICE O/P NEW MOD 45 MIN: CPT | Mod: S$GLB,,, | Performed by: OBSTETRICS & GYNECOLOGY

## 2025-07-01 PROCEDURE — 87591 N.GONORRHOEAE DNA AMP PROB: CPT | Performed by: OBSTETRICS & GYNECOLOGY

## 2025-07-01 PROCEDURE — 81515 NFCT DS BV&VAGINITIS DNA ALG: CPT | Performed by: OBSTETRICS & GYNECOLOGY

## 2025-07-01 PROCEDURE — 3078F DIAST BP <80 MM HG: CPT | Mod: CPTII,S$GLB,, | Performed by: OBSTETRICS & GYNECOLOGY

## 2025-07-01 PROCEDURE — 1160F RVW MEDS BY RX/DR IN RCRD: CPT | Mod: CPTII,S$GLB,, | Performed by: OBSTETRICS & GYNECOLOGY

## 2025-07-01 RX ORDER — CARBAMAZEPINE 200 MG/1
TABLET, EXTENDED RELEASE ORAL
COMMUNITY
Start: 2021-09-16

## 2025-07-01 RX ORDER — CLOTRIMAZOLE AND BETAMETHASONE DIPROPIONATE 10; .64 MG/G; MG/G
CREAM TOPICAL 2 TIMES DAILY
Qty: 15 G | Refills: 0 | Status: SHIPPED | OUTPATIENT
Start: 2025-07-01

## 2025-07-01 RX ORDER — PROGESTERONE 200 MG/1
200 CAPSULE ORAL NIGHTLY
Qty: 14 CAPSULE | Refills: 6 | Status: SHIPPED | OUTPATIENT
Start: 2025-07-01 | End: 2025-07-15

## 2025-07-01 RX ORDER — ZONISAMIDE 50 MG/1
CAPSULE ORAL
COMMUNITY
Start: 2025-06-30

## 2025-07-01 RX ORDER — TERCONAZOLE 4 MG/G
1 CREAM VAGINAL NIGHTLY
Qty: 45 G | Refills: 0 | Status: SHIPPED | OUTPATIENT
Start: 2025-07-01 | End: 2025-07-08

## 2025-07-03 ENCOUNTER — PATIENT MESSAGE (OUTPATIENT)
Dept: PAIN MEDICINE | Facility: CLINIC | Age: 35
End: 2025-07-03
Payer: COMMERCIAL

## 2025-07-05 LAB
BACTERIAL VAGINOSIS DNA (OHS): NOT DETECTED
C TRACH DNA SPEC QL NAA+PROBE: NOT DETECTED
CANDIDA GLABRATA/KRUSEI DNA (OHS): NOT DETECTED
CANDIDA SPECIES DNA (OHS): DETECTED
CTGC SOURCE (OHS) ORD-325: NORMAL
N GONORRHOEA DNA UR QL NAA+PROBE: NOT DETECTED
TRICHOMONAS VAGINALIS DNA (OHS): NOT DETECTED

## 2025-07-06 ENCOUNTER — RESULTS FOLLOW-UP (OUTPATIENT)
Dept: OBSTETRICS AND GYNECOLOGY | Facility: CLINIC | Age: 35
End: 2025-07-06

## 2025-07-14 ENCOUNTER — TELEPHONE (OUTPATIENT)
Dept: PAIN MEDICINE | Facility: CLINIC | Age: 35
End: 2025-07-14
Payer: COMMERCIAL

## 2025-07-14 NOTE — TELEPHONE ENCOUNTER
"Left a voicemail, confirming appointment location & time on 07/15/25 with Dr. Yeh.     "    You  Maricel Brown now (8:35 AM)     GS  Ms. Jha,  Good morning. I left a voicemail earlier today, reminding you of your appointment with Dr. Yeh  on Tuesday, July 15, 2025 at 2:15 pm.       We are located at:  [address]  [address] Floor, [address]  [place] [address]     See you then!     Thank you,  FABIOLA Jimenes LPN    This [facility] message has not been read.   "  "

## 2025-07-15 ENCOUNTER — PROCEDURE VISIT (OUTPATIENT)
Dept: PAIN MEDICINE | Facility: CLINIC | Age: 35
End: 2025-07-15
Payer: COMMERCIAL

## 2025-07-15 VITALS
DIASTOLIC BLOOD PRESSURE: 63 MMHG | HEIGHT: 66 IN | BODY MASS INDEX: 27 KG/M2 | HEART RATE: 68 BPM | TEMPERATURE: 98 F | RESPIRATION RATE: 18 BRPM | SYSTOLIC BLOOD PRESSURE: 109 MMHG | OXYGEN SATURATION: 98 % | WEIGHT: 168 LBS

## 2025-07-15 DIAGNOSIS — M54.81 OCCIPITAL NEURALGIA OF RIGHT SIDE: Primary | ICD-10-CM

## 2025-07-15 DIAGNOSIS — M79.18 MYOFASCIAL PAIN: ICD-10-CM

## 2025-07-15 DIAGNOSIS — M54.81 BILATERAL OCCIPITAL NEURALGIA: ICD-10-CM

## 2025-07-15 DIAGNOSIS — M54.81 OCCIPITAL NEURALGIA, UNSPECIFIED LATERALITY: ICD-10-CM

## 2025-07-15 NOTE — PROGRESS NOTES
"PROCEDURE: Trigger Point Injections with Ultrasound Guidance- Location of Myofascial Pain - right CERVICAL Paraspinals (Splenius Capitis, Semispinalis Capitis, Longissimus Capitis)    PATIENT NAME: Maricel Jha   MRN: [id number]     DATE OF PROCEDURE: 07/15/2025    Diagnosis: Myalgia (M79.1)  CPT code: 06324 (Injection(s); single or multiple trigger point(s), 3 or more muscles), 44972 (Ultrasonic guidance for needle placement imaging supervision and interpretation)    Postprocedural Diagnosis: Same  Needle Type: - 27G 1.5" needle    Solution injected: A 10ml mixture of 0.25% Bupivacaine  Volume Injected for each Trigger Point: 1ml  Number of Trigger Point Injected: 3    Estimated Blood Loss - <2ml  Drains: None  Specimens Removed: None  Urine Output - Not Measured  Complications: None  Outcome: Good    Informed Consent:  The patient's condition and proposed procedures, risks (including complications of nerve damage,  bleeding, infection, and failure of pain relief), and alternatives were discussed with the patient or responsible party.  The patient's/responsible party's questions were answered.  The patient/responsible party appeared to understand and chose to proceed.  Informed consent was obtained.    Procedure in Detail:  The procedure was performed in the procedure treatment room.  After obtaining written consent, the patient was placed in a  Seated  position. By ultrasound the target muscles were identified that correlated with the patient's pain pattern.    Procedural Pause:  A procedural pause verifying correct patient, medical record number, allergies, medications to be administered, current vital signs, and surgical site was performed immediately prior to beginning the procedure.    The above noted needle was advanced towards each trigger point under ultrasound guidance until the patient's typical radiating pain pattern was reproduced. Muscle twitch or reproduction of pain confirmed proper placement of the " "needle. After negative aspiration for heme, the above noted solution was injected in a fanned-out distribution at each trigger point. The needle(s) was then removed.     All sites were done in the same manner. The patient tolerated the procedure well and without complications. After meeting discharge criteria, the patient was discharged home safely.    PROCEDURE: right Occipital Nerve Block with Ultrasound Guidance    Patient Name: Maricel Jha  MRN: [id number]    PROCEDURE DATE: 7/15/2025    Diagnosis: Occipital Neuralgia  CPT code:  10695 (occipital nerve block), 44266 (Ultrasonic guidance for needle placement imaging supervision and interpretation)  Postprocedural Diagnosis: Same  Needle Type: - 27G 1.5" needle    Solution injected: A 6 ml of 0.25% bupivacaine at each location    Estimated Blood Loss - <2ml  Drains: None  Specimens Removed: None  Urine Output - Not Measured  Complications: None  Outcome: Good    Informed Consent:  The patient's condition and proposed procedures, risks (including complications of nerve damage,  bleeding, infection, and failure of pain relief), and alternatives were discussed with the patient or responsible party.  The patient's/responsible party's questions were answered.  The patient/responsible party appeared to understand and chose to proceed.  Informed consent was obtained.    Procedure in Detail:  The procedure was performed in the procedure treatment room with ultrasound.The patient was placed in a seated position flexing the neck forward over a table and pillow.  Next, the patients hair and scalp were thoroughly cleansed using an aseptic technique. The external occipital protuberance and the superior nuchal line and occipital artery on the side noted above were identified by palpation.  The skin entry point was then identified.    Procedural Pause:  A procedural pause verifying correct patient, medical record number, allergies, medications to be administered, current vital " signs, and surgical site was performed immediately prior to beginning the procedure.    The skin and subcutaneous tissue overlying the target site of the injection was anesthetized using 2 ml of 1% lidocaine MPF with a 27-gauge, 1½ -inch needle.   The plane between the obliquus capitis inferior and semispinalis capitis muscles was identified.  The linear probe was then rotated toward the ipsilateral ear and the occipital nerve was visualized.  Using in plane visualization, the needle was introduced lateral to medial toward the nerve with care taken to avoid the occipital artery.  After negative aspiration, the above injectate was administered and the needle removed.    The patient tolerated the procedure well and without complications. After meeting discharge criteria, the patient was discharged home safely.    OF NOTE, we did not use steroids today at patient's behest. She would like to alternate using a steroid mixture each visit, so for the next procedure visit, she would like to use steroids the next visit.    DISCUSSION:  Today we performed a occipital nerve block and trigger point injection. The patient was advised to relax and avoid any heavy lifting or excessive bending for 24 hours.  She was advised that she may return to her usual activities after 24 hours if she is otherwise feeling well.  The patient was advised not to bathe or soak in water for 24 hours but that showering would be acceptable.  The patient was instructed that if she experienced fever or chills, new weakness, new sensory changes, any changes in bowel or bladder habits, worsening back pain, new headache, neck stiffness, or other new symptoms, that she should contact the pain clinic immediately or dial 911 if unable to reach the pain clinic.    Note Electronically Signed By:  Nathaly Jarrett  07/15/2025         I reviewed and edited the resident/fellow's note. I conducted my own interview and physical examination. I agree with the  findings and documentation. I was present and supervising all critical portions of the procedure.      Osman Yeh MD

## 2025-08-13 ENCOUNTER — OFFICE VISIT (OUTPATIENT)
Dept: SPORTS MEDICINE | Facility: CLINIC | Age: 35
End: 2025-08-13
Payer: COMMERCIAL

## 2025-08-13 VITALS — DIASTOLIC BLOOD PRESSURE: 75 MMHG | SYSTOLIC BLOOD PRESSURE: 119 MMHG | HEART RATE: 54 BPM

## 2025-08-13 DIAGNOSIS — M99.07 UPPER EXTREMITY SOMATIC DYSFUNCTION: ICD-10-CM

## 2025-08-13 DIAGNOSIS — M54.2 CERVICALGIA: ICD-10-CM

## 2025-08-13 DIAGNOSIS — M25.511 PERISCAPULAR PAIN OF RIGHT SHOULDER: Primary | ICD-10-CM

## 2025-08-13 DIAGNOSIS — M79.10 MYALGIA: ICD-10-CM

## 2025-08-13 DIAGNOSIS — M99.01 CERVICAL (NECK) REGION SOMATIC DYSFUNCTION: ICD-10-CM

## 2025-08-13 DIAGNOSIS — G44.059 SHORT LASTING UNILATERAL NEURALGIFORM HEADACHE WITH CONJUNCTIVAL INJECTION AND TEARING (SUNCT), NOT INTRACTABLE: ICD-10-CM

## 2025-08-13 DIAGNOSIS — M99.08 SOMATIC DYSFUNCTION OF RIB CAGE REGION: ICD-10-CM

## 2025-08-13 DIAGNOSIS — M99.00 SOMATIC DYSFUNCTION OF HEAD REGION: ICD-10-CM

## 2025-08-13 DIAGNOSIS — M99.02 SOMATIC DYSFUNCTION OF THORACIC REGION: ICD-10-CM

## 2025-08-13 DIAGNOSIS — M26.609 TMJ (TEMPOROMANDIBULAR JOINT DISORDER): ICD-10-CM

## 2025-08-13 PROCEDURE — 99999 PR PBB SHADOW E&M-EST. PATIENT-LVL III: CPT | Mod: PBBFAC,,, | Performed by: NEUROMUSCULOSKELETAL MEDICINE & OMM

## 2025-08-26 ENCOUNTER — PATIENT MESSAGE (OUTPATIENT)
Dept: SPORTS MEDICINE | Facility: CLINIC | Age: 35
End: 2025-08-26
Payer: COMMERCIAL

## 2025-08-29 ENCOUNTER — HOSPITAL ENCOUNTER (OUTPATIENT)
Dept: RADIOLOGY | Facility: HOSPITAL | Age: 35
Discharge: HOME | End: 2025-08-29
Attending: NEUROMUSCULOSKELETAL MEDICINE & OMM
Payer: COMMERCIAL

## 2025-08-29 ENCOUNTER — TELEPHONE (OUTPATIENT)
Dept: PAIN MEDICINE | Facility: CLINIC | Age: 35
End: 2025-08-29
Payer: COMMERCIAL

## 2025-08-29 ENCOUNTER — OFFICE VISIT (OUTPATIENT)
Dept: SPORTS MEDICINE | Facility: CLINIC | Age: 35
End: 2025-08-29
Payer: COMMERCIAL

## 2025-08-29 VITALS
HEART RATE: 54 BPM | SYSTOLIC BLOOD PRESSURE: 112 MMHG | HEIGHT: 66 IN | BODY MASS INDEX: 26.93 KG/M2 | WEIGHT: 167.56 LBS | DIASTOLIC BLOOD PRESSURE: 69 MMHG

## 2025-08-29 DIAGNOSIS — M79.671 RIGHT FOOT PAIN: ICD-10-CM

## 2025-08-29 DIAGNOSIS — M99.03 SOMATIC DYSFUNCTION OF LUMBAR REGION: ICD-10-CM

## 2025-08-29 DIAGNOSIS — M99.05 SOMATIC DYSFUNCTION OF PELVIC REGION: ICD-10-CM

## 2025-08-29 DIAGNOSIS — M77.51 TENDINITIS OF RIGHT FLEXOR HALLUCIS LONGUS: Primary | ICD-10-CM

## 2025-08-29 DIAGNOSIS — M79.10 MYALGIA: ICD-10-CM

## 2025-08-29 DIAGNOSIS — M99.06 SOMATIC DYSFUNCTION OF LOWER EXTREMITY: ICD-10-CM

## 2025-08-29 PROCEDURE — 99999 PR PBB SHADOW E&M-EST. PATIENT-LVL IV: CPT | Mod: PBBFAC,,, | Performed by: NEUROMUSCULOSKELETAL MEDICINE & OMM

## 2025-09-02 ENCOUNTER — TELEPHONE (OUTPATIENT)
Dept: PAIN MEDICINE | Facility: CLINIC | Age: 35
End: 2025-09-02
Payer: COMMERCIAL

## 2025-09-02 ENCOUNTER — PROCEDURE VISIT (OUTPATIENT)
Dept: PAIN MEDICINE | Facility: CLINIC | Age: 35
End: 2025-09-02
Payer: COMMERCIAL

## 2025-09-02 VITALS
BODY MASS INDEX: 26.97 KG/M2 | DIASTOLIC BLOOD PRESSURE: 58 MMHG | WEIGHT: 167.13 LBS | HEART RATE: 79 BPM | SYSTOLIC BLOOD PRESSURE: 99 MMHG

## 2025-09-02 DIAGNOSIS — M79.18 MYOFASCIAL PAIN: ICD-10-CM

## 2025-09-02 DIAGNOSIS — M54.81 OCCIPITAL NEURALGIA OF RIGHT SIDE: Primary | ICD-10-CM

## 2025-09-02 RX ADMIN — DEXAMETHASONE SODIUM PHOSPHATE 4 MG: 4 INJECTION, SOLUTION INTRA-ARTICULAR; INTRALESIONAL; INTRAMUSCULAR; INTRAVENOUS; SOFT TISSUE at 04:09

## 2025-09-05 RX ORDER — DEXAMETHASONE SODIUM PHOSPHATE 4 MG/ML
4 INJECTION, SOLUTION INTRA-ARTICULAR; INTRALESIONAL; INTRAMUSCULAR; INTRAVENOUS; SOFT TISSUE
Status: COMPLETED | OUTPATIENT
Start: 2025-09-05 | End: 2025-09-02